# Patient Record
Sex: FEMALE | Race: WHITE | Employment: OTHER | URBAN - METROPOLITAN AREA
[De-identification: names, ages, dates, MRNs, and addresses within clinical notes are randomized per-mention and may not be internally consistent; named-entity substitution may affect disease eponyms.]

---

## 2017-01-19 ENCOUNTER — OFFICE VISIT (OUTPATIENT)
Dept: FAMILY MEDICINE CLINIC | Age: 67
End: 2017-01-19

## 2017-01-19 VITALS
HEIGHT: 63 IN | RESPIRATION RATE: 16 BRPM | SYSTOLIC BLOOD PRESSURE: 151 MMHG | BODY MASS INDEX: 21.12 KG/M2 | OXYGEN SATURATION: 100 % | HEART RATE: 91 BPM | WEIGHT: 119.2 LBS | DIASTOLIC BLOOD PRESSURE: 83 MMHG | TEMPERATURE: 96.7 F

## 2017-01-19 DIAGNOSIS — Z13.820 SCREENING FOR OSTEOPOROSIS: ICD-10-CM

## 2017-01-19 DIAGNOSIS — C56.9 OVARIAN CANCER, UNSPECIFIED LATERALITY (HCC): ICD-10-CM

## 2017-01-19 DIAGNOSIS — Z12.31 ENCOUNTER FOR MAMMOGRAM TO ESTABLISH BASELINE MAMMOGRAM: ICD-10-CM

## 2017-01-19 DIAGNOSIS — L65.9 ALOPECIA: ICD-10-CM

## 2017-01-19 DIAGNOSIS — M15.9 PRIMARY OSTEOARTHRITIS INVOLVING MULTIPLE JOINTS: Primary | ICD-10-CM

## 2017-01-19 RX ORDER — CYCLOBENZAPRINE HCL 10 MG
10 TABLET ORAL
Qty: 30 TAB | Refills: 1 | Status: SHIPPED | OUTPATIENT
Start: 2017-01-19 | End: 2017-11-14

## 2017-01-19 RX ORDER — DICLOFENAC SODIUM 10 MG/G
2 GEL TOPICAL 4 TIMES DAILY
Qty: 100 G | Refills: 12 | Status: SHIPPED | OUTPATIENT
Start: 2017-01-19 | End: 2017-11-14 | Stop reason: SDUPTHER

## 2017-01-19 NOTE — PROGRESS NOTES
1. Have you been to the ER, urgent care clinic since your last visit? Hospitalized since your last visit? No    2. Have you seen or consulted any other health care providers outside of the [address] [place] [address] since your last visit? Include any pap smears or colon screening.  No

## 2017-01-19 NOTE — MR AVS SNAPSHOT
Visit Information Date & Time Provider Department Dept. Phone Encounter #  
 1/19/2017  3:00 PM Aishwarya Artis., Zaheer 6 [phone] [id number] Follow-up Instructions Return in about 3 months (around 4/19/2017) for physical, labs at next visit, mammo prior, dexa prior, EKG next visit. Upcoming Health Maintenance Date Due DTaP/Tdap/Td series (1 - Tdap) 12/2/2008 MEDICARE YEARLY EXAM 11/7/2015 Pneumococcal 65+ High/Highest Risk (2 of 2 - PPSV23) 1/27/2016 INFLUENZA AGE 9 TO ADULT 8/1/2016 BREAST CANCER SCRN MAMMOGRAM 10/24/2016 GLAUCOMA SCREENING Q2Y 12/16/2017 COLONOSCOPY 1/13/2019 Allergies as of 1/19/2017  Review Complete On: 1/19/2017 By: Aishwarya Artis., DO No Known Allergies Current Immunizations  Reviewed on 10/14/2013 Name Date Influenza High Dose Vaccine PF 11/24/2015 Influenza Vaccine 10/27/2014 11:17 AM, 10/14/2013 Influenza Vaccine Split 10/24/2012, 12/10/2010 Influenza Vaccine Whole 12/1/2008 Pneumococcal Conjugate (PCV-13) 12/2/2015 Pneumococcal Vaccine (Unspecified Type) 12/1/2008 TD Vaccine 12/1/2008 Zoster 12/10/2010 Not reviewed this visit You Were Diagnosed With   
  
 Codes Comments Primary osteoarthritis involving multiple joints    -  Primary ICD-10-CM: M15.0 ICD-9-CM: 715.09 Alopecia     ICD-10-CM: L65.9 ICD-9-CM: 704.00 Ovarian cancer, unspecified laterality ([facility] [address]     ICD-10-CM: C56.9 ICD-9-CM: 183.0 Screening for osteoporosis     ICD-10-CM: Z13.820 ICD-9-CM: V82.81 Encounter for mammogram to establish baseline mammogram     ICD-10-CM: Z12.31 
ICD-9-CM: V76.12 Vitals BP Pulse Temp Resp Height(growth percentile) Weight(growth percentile) 151/83 (BP 1 Location: Right arm, BP Patient Position: Sitting) 91 96.7 °F (35.9 °C) (Oral) 16 5' 3\" (1.6 m) 119 lb 3.2 oz (54.1 kg) SpO2 BMI OB Status Smoking Status 100% 21.12 kg/m2 Hysterectomy Never Smoker BMI and BSA Data Body Mass Index Body Surface Area  
 21.12 kg/m 2 1.55 m 2 Preferred Pharmacy Pharmacy Name Phone [facility], [address] [phone] Your Updated Medication List  
  
   
This list is accurate as of: 1/19/17  3:19 PM.  Always use your most recent med list.  
  
  
  
  
 betamethasone dipropionate 0.05 % topical cream  
Commonly known as:  Reese Chalet Apply  to affected area two (2) times a day. On hands, arms, legs, belly. diclofenac 1 % Gel Commonly known as:  VOLTAREN Apply 2 g to affected area four (4) times daily. For hand pain  
  
 fexofenadine-pseudoephedrine  mg Tb12 Commonly known as:  ALLEGRA-D 12 HOUR Take 1 tablet by mouth two (2) times a day. polyethylene glycol 17 gram packet Commonly known as:  [place] Hallmark Take 1 Packet by mouth daily. senna-docusate 8.6-50 mg per tablet Commonly known as:  SENOKOT-S Take 3 Tabs by mouth nightly as needed for Constipation. Prescriptions Sent to Pharmacy Refills  
 diclofenac (VOLTAREN) 1 % gel 12 Sig: Apply 2 g to affected area four (4) times daily. For hand pain  
 Class: Normal  
 Pharmacy: [facility] [address] [place] [address], [address] [place] [address] #: [phone] Route: Topical  
  
Follow-up Instructions Return in about 3 months (around 4/19/2017) for physical, labs at next visit, mammo prior, dexa prior, EKG next visit. To-Do List   
 Around 04/19/2017 Lab:  CBC WITH AUTOMATED DIFF   
  
 04/19/2017 Imaging:  DEXA BONE DENSITY STUDY AXIAL Around 04/19/2017 Lab:  LIPID PANEL   
  
 04/19/2017 Imaging:  CYRIL MAMMO BI SCREENING INCL CAD Around 04/19/2017 Lab:  METABOLIC PANEL, COMPREHENSIVE Around 04/19/2017   Lab:  TSH 3RD GENERATION   
  
 Around 04/19/2017 Lab:  URINALYSIS W/ RFLX MICROSCOPIC Introducing [facility] & HEALTH SERVICES! [facility] introduces [employer] patient portal. Now you can access parts of your medical record, email your doctor's office, and request medication refills online. 1. In your internet browser, go to [email or web address] 2. Click on the First Time User? Click Here link in the Sign In box. You will see the New Member Sign Up page. 3. Enter your [employer] Access Code exactly as it appears below. You will not need to use this code after youve completed the sign-up process. If you do not sign up before the expiration date, you must request a new code. · [employer] Access Code: [email or web address] Expires: 3/22/2017  4:35 PM 
 
4. Enter the last four digits of your Social Security Number (xxxx) and Date of Birth (mm/dd/yyyy) as indicated and click Submit. You will be taken to the next sign-up page. 5. Create a [employer] ID. This will be your [employer] login ID and cannot be changed, so think of one that is secure and easy to remember. 6. Create a [employer] password. You can change your password at any time. 7. Enter your Password Reset Question and Answer. This can be used at a later time if you forget your password. 8. Enter your e-mail address. You will receive e-mail notification when new information is available in [address] 9. Click Sign Up. You can now view and download portions of your medical record. 10. Click the Download Summary menu link to download a portable copy of your medical information. If you have questions, please visit the Frequently Asked Questions section of the [employer] website. Remember, [employer] is NOT to be used for urgent needs. For medical emergencies, dial 911. Now available from your iPhone and Android! Please provide this summary of care documentation to your next provider. Your primary care clinician is listed as [id number] [facility].  If you have any questions after today's visit, please call [phone].

## 2017-01-19 NOTE — PROGRESS NOTES
Demetrius Lara is a 74 y.o.  female and presents with    Chief Complaint   Patient presents with    Hand Pain           Subjective:    Osteoarthritis and Chronic Pain:  Patient has osteoarthritis, primarily affecting the hands. Symptoms onset: problem is longstanding. Rheumatological ROS: no current joint or muscle symptoms, essentially pain-free. Response to treatment plan: stable. Additional Concerns:          Patient Active Problem List    Diagnosis Date Noted    Alopecia 01/13/2011    Ovarian cancer ([facility] 01/13/2010     Current Outpatient Prescriptions   Medication Sig Dispense Refill    diclofenac (VOLTAREN) 1 % gel Apply 2 g to affected area four (4) times daily. For hand pain 100 g 12    senna-docusate (SENOKOT-S) 8.6-50 mg per tablet Take 3 Tabs by mouth nightly as needed for Constipation. 100 Tab 12    polyethylene glycol (MIRALAX) 17 gram packet Take 1 Packet by mouth daily. 100 Each 12    betamethasone dipropionate (DIPROSONE) 0.05 % topical cream Apply  to affected area two (2) times a day. On hands, arms, legs, belly. 30 g 3    fexofenadine-pseudoephedrine (ALLEGRA-D 12 HOUR)  mg per tablet Take 1 tablet by mouth two (2) times a day. 180 each 4     No Known Allergies  Past Medical History   Diagnosis Date    Alopecia 1/13/2011    Halitosis 1/13/2011    Ovarian cancer ([facility]      02/1998     Past Surgical History   Procedure Laterality Date    Hx gyn       Family History   Problem Relation Age of Onset    Heart Disease Mother      Social History   Substance Use Topics    Smoking status: Never Smoker    Smokeless tobacco: Never Used    Alcohol use No       ROS       All other systems reviewed and are negative.       Objective:  Vitals:    01/19/17 1507   BP: 151/83   Pulse: 91   Resp: 16   Temp: 96.7 °F (35.9 °C)   TempSrc: Oral   SpO2: 100%   Weight: 119 lb 3.2 oz (54.1 kg)   Height: 5' 3\" (1.6 m)   PainSc:   0 - No pain                 alert, well appearing, and in no distress and oriented to person, place, and time  Musculoskeletal - abnormal exam of both hands  Extremities - peripheral pulses normal, no pedal edema, no clubbing or cyanosis        LABS   Component      Latest Ref Rng & Units 12/22/2016 12/22/2016 12/22/2016 12/22/2016           4:59 PM  4:59 PM  4:59 PM  4:59 PM   WBC      4.6 - 13.2 K/uL       RBC      4.20 - 5.30 M/uL       HGB      12.0 - 16.0 g/dL       HCT      35.0 - 45.0 %       MCV      74.0 - 97.0 FL       MCH      24.0 - 34.0 PG       MCHC      31.0 - 37.0 g/dL       RDW      11.6 - 14.5 %       PLATELET      972 - 148 K/uL       MPV      9.2 - 11.8 FL       NEUTROPHILS      40 - 73 %       LYMPHOCYTES      21 - 52 %       MONOCYTES      3 - 10 %       EOSINOPHILS      0 - 5 %       BASOPHILS      0 - 2 %       ABS. NEUTROPHILS      1.8 - 8.0 K/UL       ABS. LYMPHOCYTES      0.9 - 3.6 K/UL       ABS. MONOCYTES      0.05 - 1.2 K/UL       ABS. EOSINOPHILS      0.0 - 0.4 K/UL       ABS. BASOPHILS      0.0 - 0.06 K/UL       DF             CCP Antibodies IgG/IgA      0 - 19 units  5     Sed rate, automated      0 - 30 mm/hr   29    Uric acid      2.6 - 7.2 MG/DL       C-Reactive protein      0 - 0.3 mg/dL    1.4 (H)   RA Latex, Ql.      NEG   NEGATIVE        Component      Latest Ref Rng & Units 12/22/2016 12/22/2016           4:59 PM  4:59 PM   WBC      4.6 - 13.2 K/uL  9.2   RBC      4.20 - 5.30 M/uL  4.15 (L)   HGB      12.0 - 16.0 g/dL  12.7   HCT      35.0 - 45.0 %  38.5   MCV      74.0 - 97.0 FL  92.8   MCH      24.0 - 34.0 PG  30.6   MCHC      31.0 - 37.0 g/dL  33.0   RDW      11.6 - 14.5 %  12.5   PLATELET      472 - 045 K/uL  290   MPV      9.2 - 11.8 FL  9.7   NEUTROPHILS      40 - 73 %  68   LYMPHOCYTES      21 - 52 %  18 (L)   MONOCYTES      3 - 10 %  10   EOSINOPHILS      0 - 5 %  3   BASOPHILS      0 - 2 %  1   ABS. NEUTROPHILS      1.8 - 8.0 K/UL  6.3   ABS. LYMPHOCYTES      0.9 - 3.6 K/UL  1.7   ABS.  MONOCYTES      0.05 - 1.2 K/UL  0.9 ABS. EOSINOPHILS      0.0 - 0.4 K/UL  0.3   ABS. BASOPHILS      0.0 - 0.06 K/UL  0.1 (H)   DF        AUTOMATED   CCP Antibodies IgG/IgA      0 - 19 units     Sed rate, automated      0 - 30 mm/hr     Uric acid      2.6 - 7.2 MG/DL 2.5 (L)    C-Reactive protein      0 - 0.3 mg/dL     RA Latex, Ql.      NEG         TESTS  EXAM: XR HAND LT AP/LAT     INDICATION: Left hand pain     COMPARISON: None.     FINDINGS: 2 views of the left hand demonstrate no evidence of fracture or  dislocation or other acute finding. Medial subluxation of the second metacarpal  phalangeal joint noted. Moderate/severe degenerative changes of the first  interphalangeal joint are present with loss of joint space and marginal  osteophytes. Mild overlying soft tissue swelling.     IMPRESSION  IMPRESSION: Chronic/degenerative changes as described. No acute osseous  abnormality identified.     EXAM: XR HAND RT AP/LAT     INDICATION: Right hand injury, pain and swelling.     COMPARISON: None.     FINDINGS: 2 views of the right hand demonstrate no evidence of fracture or  dislocation. Medial subluxation of the second and third metacarpophalangeal  joints noted. Mild/moderate degenerative changes are present, most notably of  the second distal interphalangeal joint. No focal soft tissue abnormality  identified.     IMPRESSION  IMPRESSION: No acute osseous abnormality identified. Chronic/degenerative  changes as described. Assessment/Plan:    osteoarthitis   Will give some voltaren gel and f/u prn      Lab review:       I have discussed the diagnosis with the patient and the intended plan as seen in the above orders. The patient has received an after-visit summary and questions were answered concerning future plans. I have discussed medication side effects and warnings with the patient as well. I have reviewed the plan of care with the patient, accepted their input and they are in agreement with the treatment goals.      Follow-up Disposition: Not on File

## 2017-01-24 ENCOUNTER — TELEPHONE (OUTPATIENT)
Dept: FAMILY MEDICINE CLINIC | Age: 67
End: 2017-01-24

## 2017-02-08 ENCOUNTER — HOSPITAL ENCOUNTER (OUTPATIENT)
Dept: MAMMOGRAPHY | Age: 67
Discharge: HOME | End: 2017-02-08
Attending: FAMILY MEDICINE
Payer: COMMERCIAL

## 2017-02-08 ENCOUNTER — HOSPITAL ENCOUNTER (OUTPATIENT)
Dept: GENERAL RADIOLOGY | Age: 67
Discharge: HOME | End: 2017-02-08
Attending: FAMILY MEDICINE
Payer: COMMERCIAL

## 2017-02-08 DIAGNOSIS — C56.9 OVARIAN CANCER, UNSPECIFIED LATERALITY (HCC): ICD-10-CM

## 2017-02-08 DIAGNOSIS — M15.9 PRIMARY OSTEOARTHRITIS INVOLVING MULTIPLE JOINTS: ICD-10-CM

## 2017-02-08 DIAGNOSIS — L65.9 ALOPECIA: ICD-10-CM

## 2017-02-08 DIAGNOSIS — M81.0 OSTEOPOROSIS: ICD-10-CM

## 2017-02-08 DIAGNOSIS — Z13.820 SCREENING FOR OSTEOPOROSIS: ICD-10-CM

## 2017-02-08 DIAGNOSIS — Z12.31 ENCOUNTER FOR MAMMOGRAM TO ESTABLISH BASELINE MAMMOGRAM: ICD-10-CM

## 2017-02-08 PROCEDURE — 77080 DXA BONE DENSITY AXIAL: CPT

## 2017-02-08 PROCEDURE — 77063 BREAST TOMOSYNTHESIS BI: CPT

## 2017-02-08 PROCEDURE — 77067 SCR MAMMO BI INCL CAD: CPT

## 2017-04-14 ENCOUNTER — TELEPHONE (OUTPATIENT)
Dept: FAMILY MEDICINE CLINIC | Age: 67
End: 2017-04-14

## 2017-04-14 NOTE — TELEPHONE ENCOUNTER
Pt  called stating that Mrs. Peter Patiño has an UTI and would like for Dr. John Young to fill her a prescription for her to treat it.  Please assist.

## 2017-04-14 NOTE — TELEPHONE ENCOUNTER
Please see message below. Patient wanted same day appt but was advised that there were no appts avail. Please advise.

## 2017-10-05 ENCOUNTER — OFFICE VISIT (OUTPATIENT)
Dept: FAMILY MEDICINE CLINIC | Age: 67
End: 2017-10-05

## 2017-10-05 VITALS
DIASTOLIC BLOOD PRESSURE: 67 MMHG | BODY MASS INDEX: 21.09 KG/M2 | HEIGHT: 63 IN | RESPIRATION RATE: 16 BRPM | SYSTOLIC BLOOD PRESSURE: 141 MMHG | HEART RATE: 86 BPM | WEIGHT: 119 LBS | TEMPERATURE: 96.5 F | OXYGEN SATURATION: 97 %

## 2017-10-05 DIAGNOSIS — D22.9 NEVUS: Primary | ICD-10-CM

## 2017-10-05 NOTE — MR AVS SNAPSHOT
Visit Information Date & Time Provider Department Dept. Phone Encounter #  
 10/5/2017  1:00 PM Ignacia Ariza [phone] [id number] Follow-up Instructions Return if symptoms worsen or fail to improve. Upcoming Health Maintenance Date Due DTaP/Tdap/Td series (1 - Tdap) 12/2/2008 MEDICARE YEARLY EXAM 11/7/2015 Pneumococcal 65+ High/Highest Risk (2 of 2 - PPSV23) 1/27/2016 INFLUENZA AGE 9 TO ADULT 8/1/2017 GLAUCOMA SCREENING Q2Y 12/16/2017 COLONOSCOPY 1/13/2019 BREAST CANCER SCRN MAMMOGRAM 2/8/2019 Allergies as of 10/5/2017  Review Complete On: 2/8/2017 By: Zulay Jean No Known Allergies Current Immunizations  Reviewed on 10/14/2013 Name Date Influenza High Dose Vaccine PF 11/24/2015 Influenza Vaccine 10/27/2014 11:17 AM, 10/14/2013 Influenza Vaccine Split 10/24/2012, 12/10/2010 Influenza Vaccine Whole 12/1/2008 Pneumococcal Conjugate (PCV-13) 12/2/2015 TD Vaccine 12/1/2008 ZZZ-RETIRED (DO NOT USE) Pneumococcal Vaccine (Unspecified Type) 12/1/2008 Zoster 12/10/2010 Not reviewed this visit You Were Diagnosed With   
  
 Codes Comments Nevus    -  Primary ICD-10-CM: D22.9 ICD-9-CM: 216.9 Vitals OB Status Smoking Status Hysterectomy Never Smoker Preferred Pharmacy Pharmacy Name Phone [facility], [address] [phone] Your Updated Medication List  
  
   
This list is accurate as of: 10/5/17  1:26 PM.  Always use your most recent med list.  
  
  
  
  
 betamethasone dipropionate 0.05 % topical cream  
Commonly known as:  Idalia Jaiden Apply  to affected area two (2) times a day. On hands, arms, legs, belly. cyclobenzaprine 10 mg tablet Commonly known as:  FLEXERIL Take 1 Tab by mouth three (3) times daily as needed for Muscle Spasm(s). diclofenac 1 % Gel Commonly known as:  VOLTAREN Apply 2 g to affected area four (4) times daily. For hand pain  
  
 fexofenadine-pseudoephedrine  mg Tb12 Commonly known as:  ALLEGRA-D 12 HOUR Take 1 tablet by mouth two (2) times a day. polyethylene glycol 17 gram packet Commonly known as:  Devyn Kevyn Take 1 Packet by mouth daily. senna-docusate 8.6-50 mg per tablet Commonly known as:  SENOKOT-S Take 3 Tabs by mouth nightly as needed for Constipation. Follow-up Instructions Return if symptoms worsen or fail to improve. Introducing [facility] & HEALTH SERVICES! [facility] introduces [employer] patient portal. Now you can access parts of your medical record, email your doctor's office, and request medication refills online. 1. In your internet browser, go to [email or web address] 2. Click on the First Time User? Click Here link in the Sign In box. You will see the New Member Sign Up page. 3. Enter your [employer] Access Code exactly as it appears below. You will not need to use this code after youve completed the sign-up process. If you do not sign up before the expiration date, you must request a new code. · [employer] Access Code: [email or web address] Expires: 1/3/2018  1:13 PM 
 
4. Enter the last four digits of your Social Security Number (xxxx) and Date of Birth (mm/dd/yyyy) as indicated and click Submit. You will be taken to the next sign-up page. 5. Create a [employer] ID. This will be your [employer] login ID and cannot be changed, so think of one that is secure and easy to remember. 6. Create a [employer] password. You can change your password at any time. 7. Enter your Password Reset Question and Answer. This can be used at a later time if you forget your password. 8. Enter your e-mail address. You will receive e-mail notification when new information is available in [address] 9. Click Sign Up.  You can now view and download portions of your medical record. 10. Click the Download Summary menu link to download a portable copy of your medical information. If you have questions, please visit the Frequently Asked Questions section of the [employer] website. Remember, [employer] is NOT to be used for urgent needs. For medical emergencies, dial 911. Now available from your iPhone and Android! Please provide this summary of care documentation to your next provider. Your primary care clinician is listed as [phone] [facility]. If you have any questions after today's visit, please call [phone].

## 2017-10-05 NOTE — PROGRESS NOTES
Katy Parker is a 69 y.o. female presents today for complaint of moles on her back and face. Pt is in Room # tx        1. Have you been to the ER, urgent care clinic since your last visit? Hospitalized since your last visit? No    2. Have you seen or consulted any other health care providers outside of the [address] [place] [address] since your last visit? Include any pap smears or colon screening.  No

## 2017-10-05 NOTE — PROGRESS NOTES
Subjective:    Kole Urbano is a 69 y.o. female who presents for lesion removal. We have discussed this procedure, including option of not performing surgery, technique of surgery and potential for scarring at an earlier visit. Oubjective:   Patient appears well. Visit Vitals    /67 (BP 1 Location: Left arm, BP Patient Position: Sitting)    Pulse 86    Temp 96.5 °F (35.8 °C) (Oral)    Resp 16    Ht 5' 3\" (1.6 m)    Wt 119 lb (54 kg)    SpO2 97%    BMI 21.08 kg/m2     Skin: tiny mole on left lip and 3 very tiny moles on her back      Assessment:   benign intradermal nevus    Procedure Note:   After informed consent was obtained, using alcohol for cleansing with sterile technique, cryotherapy with liquid nitrogen was performed. Antibiotic dressing is applied, and wound care instructions provided. Be alert for any signs of cutaneous infection. The procedure was well tolerated without complications. Follow up: the patient may return prn.

## 2017-11-14 ENCOUNTER — OFFICE VISIT (OUTPATIENT)
Dept: FAMILY MEDICINE CLINIC | Age: 67
End: 2017-11-14

## 2017-11-14 VITALS
OXYGEN SATURATION: 97 % | RESPIRATION RATE: 16 BRPM | TEMPERATURE: 97.5 F | BODY MASS INDEX: 20.66 KG/M2 | WEIGHT: 116.6 LBS | SYSTOLIC BLOOD PRESSURE: 134 MMHG | DIASTOLIC BLOOD PRESSURE: 60 MMHG | HEIGHT: 63 IN | HEART RATE: 87 BPM

## 2017-11-14 DIAGNOSIS — Z12.31 ENCOUNTER FOR MAMMOGRAM TO ESTABLISH BASELINE MAMMOGRAM: ICD-10-CM

## 2017-11-14 DIAGNOSIS — M15.9 PRIMARY OSTEOARTHRITIS INVOLVING MULTIPLE JOINTS: ICD-10-CM

## 2017-11-14 DIAGNOSIS — J06.9 VIRAL UPPER RESPIRATORY TRACT INFECTION: Primary | ICD-10-CM

## 2017-11-14 DIAGNOSIS — Z13.820 SCREENING FOR OSTEOPOROSIS: ICD-10-CM

## 2017-11-14 DIAGNOSIS — L82.1 SEBORRHEIC KERATOSES: ICD-10-CM

## 2017-11-14 DIAGNOSIS — L65.9 ALOPECIA: ICD-10-CM

## 2017-11-14 DIAGNOSIS — C56.9 OVARIAN CANCER, UNSPECIFIED LATERALITY (HCC): ICD-10-CM

## 2017-11-14 LAB
QUICKVUE INFLUENZA TEST: NEGATIVE
S PYO AG THROAT QL: NEGATIVE
VALID INTERNAL CONTROL?: YES
VALID INTERNAL CONTROL?: YES

## 2017-11-14 RX ORDER — DICLOFENAC SODIUM 10 MG/G
2 GEL TOPICAL 4 TIMES DAILY
Qty: 100 G | Refills: 12 | Status: SHIPPED | OUTPATIENT
Start: 2017-11-14 | End: 2018-03-13 | Stop reason: SDUPTHER

## 2017-11-14 RX ORDER — PROMETHAZINE HYDROCHLORIDE, PHENYLEPHRINE HYDROCHLORIDE AND CODEINE PHOSPHATE 6.25; 5; 1 MG/5ML; MG/5ML; MG/5ML
5 SOLUTION ORAL
Qty: 120 ML | Refills: 1 | Status: SHIPPED | OUTPATIENT
Start: 2017-11-14 | End: 2018-03-13

## 2017-11-14 NOTE — PROGRESS NOTES
Estephania Rizo is a 69 y.o.  female and presents with    Chief Complaint   Patient presents with    Cold    Seborrheic Keratosis           Subjective:  Upper Respiratory Infection  Patient complains of symptoms of a URI. Symptoms include congestion and coryza. Onset of symptoms was 2 days ago, stable since that time. She also c/o achiness for the past 2 days . She is drinking plenty of fluids. . Evaluation to date: none. Treatment to date: none. Also has a couple of tiny seb kers one on her right cheek and 2 on her back. Additional Concerns:          Patient Active Problem List    Diagnosis Date Noted    Alopecia 01/13/2011    Ovarian cancer ([facility] [address] 01/13/2010     Current Outpatient Prescriptions   Medication Sig Dispense Refill    promethazine-phenyleph-codeine (PHENERGAN VC WITH CODEINE) 6.25-5-10 mg/5 mL syrp Take 5 mL by mouth every six (6) hours as needed. Max Daily Amount: 20 mL. 120 mL 1    diclofenac (VOLTAREN) 1 % gel Apply 2 g to affected area four (4) times daily. For hand pain 100 g 12    cyclobenzaprine (FLEXERIL) 10 mg tablet Take 1 Tab by mouth three (3) times daily as needed for Muscle Spasm(s). 30 Tab 1    senna-docusate (SENOKOT-S) 8.6-50 mg per tablet Take 3 Tabs by mouth nightly as needed for Constipation. 100 Tab 12    polyethylene glycol (MIRALAX) 17 gram packet Take 1 Packet by mouth daily. 100 Each 12    betamethasone dipropionate (DIPROSONE) 0.05 % topical cream Apply  to affected area two (2) times a day. On hands, arms, legs, belly. 30 g 3    fexofenadine-pseudoephedrine (ALLEGRA-D 12 HOUR)  mg per tablet Take 1 tablet by mouth two (2) times a day.  180 each 4     No Known Allergies  Past Medical History:   Diagnosis Date    Alopecia 1/13/2011    Halitosis 1/13/2011    Menopause     Ovarian cancer ([facility]     02/1998     Past Surgical History:   Procedure Laterality Date    HX GYN      HX HYSTERECTOMY       Family History   Problem Relation Age of Onset  Heart Disease Mother      Social History   Substance Use Topics    Smoking status: Never Smoker    Smokeless tobacco: Never Used    Alcohol use No       ROS       All other systems reviewed and are negative. Objective:  Vitals:    11/14/17 1114   BP: 134/60   Pulse: 87   Resp: 16   Temp: 97.5 °F (36.4 °C)   TempSrc: Oral   SpO2: 97%   Weight: 116 lb 9.6 oz (52.9 kg)   Height: 5' 3\" (1.6 m)                 alert, well appearing, and in no distress, oriented to person, place, and time and normal appearing weight  Ears - bilateral TM's and external ear canals normal  Nose - normal and patent, no erythema, discharge or polyps  Mouth - mucous membranes moist, pharynx normal without lesions  Neck - supple, no significant adenopathy  Lymphatics - no palpable lymphadenopathy, no hepatosplenomegaly  Chest - clear to auscultation, no wheezes, rales or rhonchi, symmetric air entry  Heart - normal rate, regular rhythm, normal S1, S2, no murmurs, rubs, clicks or gallops        LABS   Strep and flu neg    TESTS      Assessment/Plan:    Truman Dangelo try phen cod f/u prn      carissa kers -- very tiny -- I just touched with a very light spray of liq nitrogen. She can f/uy if they don;'t resolved      Lab review:     Diagnoses and all orders for this visit:    1. Viral upper respiratory tract infection  -     AMB POC RAPID INFLUENZA TEST  -     AMB POC RAPID STREP A    2. Seborrheic keratoses    Other orders  -     promethazine-phenyleph-codeine (PHENERGAN VC WITH CODEINE) 6.25-5-10 mg/5 mL syrp; Take 5 mL by mouth every six (6) hours as needed. Max Daily Amount: 20 mL. I have discussed the diagnosis with the patient and the intended plan as seen in the above orders. The patient has received an after-visit summary and questions were answered concerning future plans. I have discussed medication side effects and warnings with the patient as well.  I have reviewed the plan of care with the patient, accepted their input and they are in agreement with the treatment goals. Follow-up Disposition:  Return if symptoms worsen or fail to improve.

## 2017-11-14 NOTE — MR AVS SNAPSHOT
Visit Information Date & Time Provider Department Dept. Phone Encounter #  
 11/14/2017 10:30 AM Ignacia Ariza [phone] [id number] Follow-up Instructions Return if symptoms worsen or fail to improve. Follow-up and Disposition History Upcoming Health Maintenance Date Due DTaP/Tdap/Td series (1 - Tdap) 12/2/2008 MEDICARE YEARLY EXAM 11/7/2015 Pneumococcal 65+ High/Highest Risk (2 of 2 - PPSV23) 1/27/2016 Influenza Age 5 to Adult 8/1/2017 GLAUCOMA SCREENING Q2Y 12/16/2017 COLONOSCOPY 1/13/2019 BREAST CANCER SCRN MAMMOGRAM 2/8/2019 Allergies as of 11/14/2017  Review Complete On: 11/14/2017 By: Higinio Haji., DO No Known Allergies Current Immunizations  Reviewed on 10/14/2013 Name Date Influenza High Dose Vaccine PF 11/24/2015 Influenza Vaccine 10/27/2014 11:17 AM, 10/14/2013 Influenza Vaccine Split 10/24/2012, 12/10/2010 Influenza Vaccine Whole 12/1/2008 Pneumococcal Conjugate (PCV-13) 12/2/2015 TD Vaccine 12/1/2008 ZZZ-RETIRED (DO NOT USE) Pneumococcal Vaccine (Unspecified Type) 12/1/2008 Zoster 12/10/2010 Not reviewed this visit You Were Diagnosed With   
  
 Codes Comments Viral upper respiratory tract infection    -  Primary ICD-10-CM: J06.9, B97.89 ICD-9-CM: 465.9 Seborrheic keratoses     ICD-10-CM: L82.1 ICD-9-CM: 702.19 Vitals BP Pulse Temp Resp Height(growth percentile) Weight(growth percentile) 134/60 (BP 1 Location: Left arm, BP Patient Position: Sitting) 87 97.5 °F (36.4 °C) (Oral) 16 5' 3\" (1.6 m) 116 lb 9.6 oz (52.9 kg) SpO2 BMI OB Status Smoking Status 97% 20.65 kg/m2 Hysterectomy Never Smoker BMI and BSA Data Body Mass Index Body Surface Area  
 20.65 kg/m 2 1.53 m 2 Preferred Pharmacy Pharmacy Name Phone  [facility], [address] [place] [address] SOPHIE/Leon Andrade [phone] Your Updated Medication List  
  
   
This list is accurate as of: 11/14/17 11:31 AM.  Always use your most recent med list.  
  
  
  
  
 betamethasone dipropionate 0.05 % topical cream  
Commonly known as:  Reese Chalet Apply  to affected area two (2) times a day. On hands, arms, legs, belly. cyclobenzaprine 10 mg tablet Commonly known as:  FLEXERIL Take 1 Tab by mouth three (3) times daily as needed for Muscle Spasm(s). diclofenac 1 % Gel Commonly known as:  VOLTAREN Apply 2 g to affected area four (4) times daily. For hand pain  
  
 fexofenadine-pseudoephedrine  mg Tb12 Commonly known as:  ALLEGRA-D 12 HOUR Take 1 tablet by mouth two (2) times a day. polyethylene glycol 17 gram packet Commonly known as:  Puposky Hallmark Take 1 Packet by mouth daily. promethazine-phenyleph-codeine 6.25-5-10 mg/5 mL Syrp Commonly known as:  PHENERGAN VC WITH CODEINE Take 5 mL by mouth every six (6) hours as needed. Max Daily Amount: 20 mL. senna-docusate 8.6-50 mg per tablet Commonly known as:  SENOKOT-S Take 3 Tabs by mouth nightly as needed for Constipation. Prescriptions Printed Refills  
 promethazine-phenyleph-codeine (PHENERGAN VC WITH CODEINE) 6.25-5-10 mg/5 mL syrp 1 Sig: Take 5 mL by mouth every six (6) hours as needed. Max Daily Amount: 20 mL. Class: Print Route: Oral  
  
We Performed the Following AMB POC RAPID INFLUENZA TEST [13930 CPT(R)] AMB POC RAPID STREP A [53301 CPT(R)] Follow-up Instructions Return if symptoms worsen or fail to improve. Introducing [facility] & HEALTH SERVICES! Aris Munoz introduces Pureshield patient portal. Now you can access parts of your medical record, email your doctor's office, and request medication refills online. 1. In your internet browser, go to [email or web address] 2. Click on the First Time User? Click Here link in the Sign In box. You will see the New Member Sign Up page. 3. Enter your Shout For Good Access Code exactly as it appears below. You will not need to use this code after youve completed the sign-up process. If you do not sign up before the expiration date, you must request a new code. · Shout For Good Access Code: [email or web address] Expires: 1/3/2018 12:13 PM 
 
4. Enter the last four digits of your Social Security Number (xxxx) and Date of Birth (mm/dd/yyyy) as indicated and click Submit. You will be taken to the next sign-up page. 5. Create a Shout For Good ID. This will be your Shout For Good login ID and cannot be changed, so think of one that is secure and easy to remember. 6. Create a Shout For Good password. You can change your password at any time. 7. Enter your Password Reset Question and Answer. This can be used at a later time if you forget your password. 8. Enter your e-mail address. You will receive e-mail notification when new information is available in [address] 9. Click Sign Up. You can now view and download portions of your medical record. 10. Click the Download Summary menu link to download a portable copy of your medical information. If you have questions, please visit the Frequently Asked Questions section of the Shout For Good website. Remember, Shout For Good is NOT to be used for urgent needs. For medical emergencies, dial 911. Now available from your iPhone and Android! Please provide this summary of care documentation to your next provider. Your primary care clinician is listed as [address]. If you have any questions after today's visit, please call [phone].

## 2017-11-14 NOTE — PROGRESS NOTES
Mendel Blacker is a 69 y.o. female presents today for skin tag removal to her back. Patient also reports of a tickle in her throat for 2 weeks           Pt is in Room # tx        1. Have you been to the ER, urgent care clinic since your last visit? Hospitalized since your last visit? No    2. Have you seen or consulted any other health care providers outside of the [address] [place] [address] since your last visit? Include any pap smears or colon screening.  No

## 2018-02-08 ENCOUNTER — HOSPITAL ENCOUNTER (OUTPATIENT)
Dept: MAMMOGRAPHY | Age: 68
Discharge: HOME | End: 2018-02-08
Attending: FAMILY MEDICINE
Payer: MEDICARE

## 2018-02-08 DIAGNOSIS — Z12.31 VISIT FOR SCREENING MAMMOGRAM: ICD-10-CM

## 2018-02-08 PROCEDURE — 77067 SCR MAMMO BI INCL CAD: CPT

## 2018-03-13 ENCOUNTER — OFFICE VISIT (OUTPATIENT)
Dept: FAMILY MEDICINE CLINIC | Age: 68
End: 2018-03-13

## 2018-03-13 VITALS
OXYGEN SATURATION: 94 % | HEART RATE: 80 BPM | WEIGHT: 120 LBS | TEMPERATURE: 97.1 F | HEIGHT: 63 IN | DIASTOLIC BLOOD PRESSURE: 69 MMHG | BODY MASS INDEX: 21.26 KG/M2 | RESPIRATION RATE: 18 BRPM | SYSTOLIC BLOOD PRESSURE: 133 MMHG

## 2018-03-13 DIAGNOSIS — M15.9 PRIMARY OSTEOARTHRITIS INVOLVING MULTIPLE JOINTS: ICD-10-CM

## 2018-03-13 DIAGNOSIS — R05.9 COUGH: Primary | ICD-10-CM

## 2018-03-13 DIAGNOSIS — Z13.820 SCREENING FOR OSTEOPOROSIS: ICD-10-CM

## 2018-03-13 DIAGNOSIS — L65.9 ALOPECIA: ICD-10-CM

## 2018-03-13 DIAGNOSIS — J02.9 SORE THROAT: ICD-10-CM

## 2018-03-13 DIAGNOSIS — C56.9 OVARIAN CANCER, UNSPECIFIED LATERALITY (HCC): ICD-10-CM

## 2018-03-13 DIAGNOSIS — Z12.31 ENCOUNTER FOR MAMMOGRAM TO ESTABLISH BASELINE MAMMOGRAM: ICD-10-CM

## 2018-03-13 RX ORDER — DICLOFENAC SODIUM 10 MG/G
2 GEL TOPICAL 4 TIMES DAILY
Qty: 100 G | Refills: 12 | Status: SHIPPED | OUTPATIENT
Start: 2018-03-13 | End: 2018-04-02 | Stop reason: SDUPTHER

## 2018-03-13 RX ORDER — HYDROCODONE POLISTIREX AND CHLORPHENIRAMINE POLISTIREX 10; 8 MG/5ML; MG/5ML
5 SUSPENSION, EXTENDED RELEASE ORAL
Qty: 180 ML | Refills: 0 | Status: SHIPPED | OUTPATIENT
Start: 2018-03-13 | End: 2018-04-02

## 2018-03-13 NOTE — PROGRESS NOTES
Rm 5  Pt presents to the clinic complaining of a cold. Flu Shot Requested: no    Depression Screening:  PHQ over the last two weeks 3/13/2018 1/19/2017 11/29/2016 9/12/2016 8/13/2015   Little interest or pleasure in doing things Not at all Not at all Not at all Not at all Not at all   Feeling down, depressed or hopeless Not at all Not at all Not at all Not at all Not at all   Total Score PHQ 2 0 0 0 0 0       Learning Assessment:  Learning Assessment 10/1/2013   PRIMARY LEARNER Patient   PRIMARY LANGUAGE ENGLISH   LEARNER PREFERENCE PRIMARY READING     LISTENING     DEMONSTRATION   ANSWERED BY patient   RELATIONSHIP SELF       Abuse Screening:  No flowsheet data found. Health Maintenance reviewed and discussed per provider: yes     Coordination of Care:    1. Have you been to the ER, urgent care clinic since your last visit? Hospitalized since your last visit? no    2. Have you seen or consulted any other health care providers outside of the [address] [place] [address] since your last visit? Include any pap smears or colon screening.  no

## 2018-03-13 NOTE — PROGRESS NOTES
Lorin Browning is a 69 y.o.  female and presents with    Chief Complaint   Patient presents with    URI    Arthritis           Subjective:    Osteoarthritis and Chronic Pain:  Patient has osteoarthritis, primarily affecting the diffuse. Symptoms onset: problem is longstanding. Rheumatological ROS: no current joint or muscle symptoms, essentially pain-free. Response to treatment plan: stable. Additional Concerns: has chronic cough. Mild irritated          Patient Active Problem List    Diagnosis Date Noted    Alopecia 01/13/2011    Ovarian cancer ([facility] 01/13/2010     Current Outpatient Prescriptions   Medication Sig Dispense Refill    diclofenac (VOLTAREN) 1 % gel Apply 2 g to affected area four (4) times daily. For hand pain 100 g 12    chlorpheniramine-HYDROcodone (TUSSIONEX) 10-8 mg/5 mL suspension Take 5 mL by mouth every twelve (12) hours as needed for Cough. Max Daily Amount: 10 mL. 180 mL 0    betamethasone dipropionate (DIPROSONE) 0.05 % topical cream Apply  to affected area two (2) times a day. On hands, arms, legs, belly. 30 g 3     No Known Allergies  Past Medical History:   Diagnosis Date    Alopecia 1/13/2011    Halitosis 1/13/2011    Menopause     Ovarian cancer ([facility]     02/1998     Past Surgical History:   Procedure Laterality Date    HX GYN      HX HYSTERECTOMY       Family History   Problem Relation Age of Onset    Heart Disease Mother      Social History   Substance Use Topics    Smoking status: Never Smoker    Smokeless tobacco: Never Used    Alcohol use No       ROS       All other systems reviewed and are negative.       Objective:  Vitals:    03/13/18 1359   BP: 133/69   Pulse: 80   Resp: 18   Temp: 97.1 °F (36.2 °C)   TempSrc: Oral   SpO2: 94%   Weight: 120 lb (54.4 kg)   Height: 5' 3\" (1.6 m)   PainSc:   0 - No pain                 alert, well appearing, and in no distress and oriented to person, place, and time  Chest - clear to auscultation, no wheezes, rales or rhonchi, symmetric air entry  Heart - normal rate, regular rhythm, normal S1, S2, no murmurs, rubs, clicks or gallops        LABS     TESTS      Assessment/Plan:    Mild uri cover with tussionex f/u prn  arthirs refilled voltaren      Lab review:     Diagnoses and all orders for this visit:    1. Cough  -     AMB POC RAPID INFLUENZA TEST  -     AMB POC RAPID STREP A  -     chlorpheniramine-HYDROcodone (TUSSIONEX) 10-8 mg/5 mL suspension; Take 5 mL by mouth every twelve (12) hours as needed for Cough. Max Daily Amount: 10 mL. 2. Sore throat  -     AMB POC RAPID INFLUENZA TEST  -     AMB POC RAPID STREP A    3. Primary osteoarthritis involving multiple joints  -     diclofenac (VOLTAREN) 1 % gel; Apply 2 g to affected area four (4) times daily. For hand pain    4. Alopecia  -     diclofenac (VOLTAREN) 1 % gel; Apply 2 g to affected area four (4) times daily. For hand pain    5. Ovarian cancer, unspecified laterality ([facility]  -     diclofenac (VOLTAREN) 1 % gel; Apply 2 g to affected area four (4) times daily. For hand pain    6. Screening for osteoporosis  -     diclofenac (VOLTAREN) 1 % gel; Apply 2 g to affected area four (4) times daily. For hand pain    7. Encounter for mammogram to establish baseline mammogram  -     diclofenac (VOLTAREN) 1 % gel; Apply 2 g to affected area four (4) times daily. For hand pain          I have discussed the diagnosis with the patient and the intended plan as seen in the above orders. The patient has received an after-visit summary and questions were answered concerning future plans. I have discussed medication side effects and warnings with the patient as well. I have reviewed the plan of care with the patient, accepted their input and they are in agreement with the treatment goals.      Follow-up Disposition: Not on File

## 2018-03-13 NOTE — MR AVS SNAPSHOT
[facility] 
 
 
 [address] [place] [address] [facility] [address] [address] 
[phone] Patient: Joanne Cleaning MRN: [id number] :1950 Visit Information Date & Time Provider Department Dept. Phone Encounter #  
 3/13/2018  2:30 PM Ign[facility] Ariza [phone] [id number] Follow-up Instructions Return if symptoms worsen or fail to improve. Your Appointments 3/13/2018  2:30 PM  
[place] [address] [address] MED CTR[facility]) Appt Note: Cold & Med Refills [id number] Formerly named [facility] [address] [facility] 83 [address] [facility]  
  
   
 [address] [place] [address]  
  
    
 2018  9:00 AM  
[place] [address] [address] [facility] Appt Note: cpe  
 [address] Formerly named [facility] [address] [facility] 83 [address] [facility]  
  
   
 [address] [place] [address] [facility] Upcoming Health Maintenance Date Due DTaP/Tdap/Td series (1 - Tdap) 2008 MEDICARE YEARLY EXAM 2015 Pneumococcal 65+ High/Highest Risk (2 of 2 - PPSV23) 2016 Influenza Age 5 to Adult 2017 GLAUCOMA SCREENING Q2Y 2017 COLONOSCOPY 2019 BREAST CANCER SCRN MAMMOGRAM 2020 Allergies as of 3/13/2018  Review Complete On: 3/13/2018 By: Marisela Martinez LPN No Known Allergies Current Immunizations  Reviewed on 10/14/2013 Name Date Influenza High Dose Vaccine PF 2015 Influenza Vaccine 10/27/2014 11:17 AM, 10/14/2013 Influenza Vaccine Split 10/24/2012, 12/10/2010 Influenza Vaccine Whole 2008 Pneumococcal Conjugate (PCV-13) 2015 TD Vaccine 2008 ZZZ-RETIRED (DO NOT USE) Pneumococcal Vaccine (Unspecified Type) 2008 Zoster 12/10/2010 Not reviewed this visit You Were Diagnosed With   
  
 Codes Comments Cough    -  Primary ICD-10-CM: O34 ICD-9-CM: 786.2 Sore throat     ICD-10-CM: J02.9 ICD-9-CM: 733 Primary osteoarthritis involving multiple joints     ICD-10-CM: M15.0 ICD-9-CM: 715.09 Alopecia     ICD-10-CM: L65.9 ICD-9-CM: 704.00 [facility]     ICD-10-CM: C56.9 ICD-9-CM: 183.0 Screening for osteoporosis     ICD-10-CM: Z13.820 ICD-9-CM: V82.81 Encounter for mammogram to establish baseline mammogram     ICD-10-CM: Z12.31 
ICD-9-CM: V76.12 Vitals BP Pulse Temp Resp Height(growth percentile) Weight(growth percentile) 133/69 (BP 1 Location: Right arm, BP Patient Position: Sitting) 80 97.1 °F (36.2 °C) (Oral) 18 5' 3\" (1.6 m) 120 lb (54.4 kg) SpO2 BMI OB Status Smoking Status 94% 21.26 kg/m2 Hysterectomy Never Smoker Vitals History BMI and BSA Data Body Mass Index Body Surface Area  
 21.26 kg/m 2 1.56 m 2 Preferred Pharmacy Pharmacy Name Phone [facility], [address] [facility] [address] [facility] [phone] Your Updated Medication List  
  
   
This list is accurate as of 3/13/18  2:25 PM.  Always use your most recent med list.  
  
  
  
  
 betamethasone dipropionate 0.05 % topical cream  
Commonly known as:  Murriel Remedies Apply  to affected area two (2) times a day. On hands, arms, legs, belly. chlorpheniramine-HYDROcodone 10-8 mg/5 mL suspension Commonly known as:  Neris Uvalde Take 5 mL by mouth every twelve (12) hours as needed for Cough. Max Daily Amount: 10 mL. diclofenac 1 % Gel Commonly known as:  VOLTAREN Apply 2 g to affected area four (4) times daily. For hand pain Prescriptions Printed Refills  
 chlorpheniramine-HYDROcodone (TUSSIONEX) 10-8 mg/5 mL suspension 0 Sig: Take 5 mL by mouth every twelve (12) hours as needed for Cough. Max Daily Amount: 10 mL. Class: Print Route: Oral  
  
Prescriptions Sent to Pharmacy Refills  
 diclofenac (VOLTAREN) 1 % gel 12 Sig: Apply 2 g to affected area four (4) times daily. For hand pain  
 Class: Normal  
 Pharmacy: [facility] [address] [place] [address], [address] [place] [address] #: [phone] Route: Topical  
  
We Performed the Following AMB POC RAPID INFLUENZA TEST [36574 CPT(R)] AMB POC RAPID STREP A [45513 CPT(R)] Follow-up Instructions Return if symptoms worsen or fail to improve. Introducing [facility] & [facility]! Dear Jeana Cortez: Thank you for requesting a Photowhoa account. Our records indicate that you already have an active Photowhoa account. You can access your account anytime at [email or web address] Did you know that you can access your hospital and ER discharge instructions at any time in Photowhoa? You can also review all of your test results from your hospital stay or ER visit. Additional Information If you have questions, please visit the Frequently Asked Questions section of the Photowhoa website at [email or web address] Remember, Photowhoa is NOT to be used for urgent needs. For medical emergencies, dial 911. Now available from your iPhone and Android! Please provide this summary of care documentation to your next provider. Your primary care clinician is listed as [address]. If you have any questions after today's visit, please call [phone].

## 2018-04-02 ENCOUNTER — OFFICE VISIT (OUTPATIENT)
Dept: FAMILY MEDICINE CLINIC | Age: 68
End: 2018-04-02

## 2018-04-02 VITALS
OXYGEN SATURATION: 99 % | HEART RATE: 83 BPM | SYSTOLIC BLOOD PRESSURE: 148 MMHG | BODY MASS INDEX: 20.91 KG/M2 | RESPIRATION RATE: 14 BRPM | WEIGHT: 118 LBS | TEMPERATURE: 97.3 F | HEIGHT: 63 IN | DIASTOLIC BLOOD PRESSURE: 67 MMHG

## 2018-04-02 DIAGNOSIS — Z23 ENCOUNTER FOR IMMUNIZATION: ICD-10-CM

## 2018-04-02 DIAGNOSIS — M15.9 PRIMARY OSTEOARTHRITIS INVOLVING MULTIPLE JOINTS: ICD-10-CM

## 2018-04-02 DIAGNOSIS — C56.9 MALIGNANT NEOPLASM OF OVARY, UNSPECIFIED LATERALITY (HCC): ICD-10-CM

## 2018-04-02 DIAGNOSIS — R79.9 ABNORMAL FINDING OF BLOOD CHEMISTRY: ICD-10-CM

## 2018-04-02 DIAGNOSIS — Z00.00 ROUTINE GENERAL MEDICAL EXAMINATION AT A HEALTH CARE FACILITY: Primary | ICD-10-CM

## 2018-04-02 RX ORDER — DICLOFENAC SODIUM 10 MG/G
2 GEL TOPICAL 4 TIMES DAILY
Qty: 100 G | Refills: 12 | Status: SHIPPED | OUTPATIENT
Start: 2018-04-02 | End: 2018-05-31

## 2018-04-02 NOTE — PROGRESS NOTES
Juan Manuel Henriquez is a 69 y.o. female presents today for her medicare wellness visit. Pt is in Room # 6        Learning Assessment (baseline): Completed  Depression Screening: Completed  Fall Risk Screening: Completed  Abuse screening: Completed  ADL Assessment: Completed    1. Have you been to the ER, urgent care clinic since your last visit? Hospitalized since your last visit? No    2. Have you seen or consulted any other health care providers outside of the [address] [place] [address] since your last visit? Include any pap smears or colon screening.  No     Health Maintenance reviewed -

## 2018-04-02 NOTE — MR AVS SNAPSHOT
[address] [facility] 
 
 
 [address] [facility] [address] [facility] 83 [address] 
[phone] Patient: Erin Parekh MRN: [id number] :1950 Visit Information Date & Time Provider Department Dept. Phone Encounter #  
 2018  9:00 AM [facility] [phone] [id number] Follow-up Instructions Return in about 1 year (around 2019) for MWE, physical, labs at next visit. Upcoming Health Maintenance Date Due DTaP/Tdap/Td series (1 - Tdap) 2008 MEDICARE YEARLY EXAM 3/14/2018 COLONOSCOPY 2019 BREAST CANCER SCRN MAMMOGRAM 2020 GLAUCOMA SCREENING Q2Y 3/30/2020 Allergies as of 2018  Review Complete On: 2018 By: Diane Michael., DO No Known Allergies Current Immunizations  Reviewed on 10/14/2013 Name Date Influenza High Dose Vaccine PF 2015 Influenza Vaccine 10/27/2014 11:17 AM, 10/14/2013 Influenza Vaccine Split 10/24/2012, 12/10/2010 Influenza Vaccine Whole 2008 Pneumococcal Conjugate (PCV-13) 2015 TD Vaccine 2008 Tdap  Incomplete ZZZ-RETIRED (DO NOT USE) Pneumococcal Vaccine (Unspecified Type) 2008 Zoster 12/10/2010 Not reviewed this visit You Were Diagnosed With   
  
 Codes Comments Routine general medical examination at a health care facility    -  Primary ICD-10-CM: Z00.00 ICD-9-CM: V70.0 Malignant neoplasm of ovary, unspecified laterality ([facility]     ICD-10-CM: C56.9 ICD-9-CM: 183.0 Primary osteoarthritis involving multiple joints     ICD-10-CM: M15.0 ICD-9-CM: 715.09 Encounter for immunization     ICD-10-CM: N26 ICD-9-CM: V03.89 Abnormal finding of blood chemistry     ICD-10-CM: R79.9 ICD-9-CM: 790.6 Vitals BP Pulse Temp Resp Height(growth percentile) Weight(growth percentile)  148/67 (BP 1 Location: Right arm, BP Patient Position: Sitting) 83 97.3 °F (36.3 °C) (Oral) 14 5' 3\" (1.6 m) 118 lb (53.5 kg) SpO2 BMI OB Status Smoking Status 99% 20.9 kg/m2 Hysterectomy Never Smoker BMI and BSA Data Body Mass Index Body Surface Area  
 20.9 kg/m 2 1.54 m 2 Preferred Pharmacy Pharmacy Name Phone [facility], [address] [phone] Your Updated Medication List  
  
   
This list is accurate as of 4/2/18 10:01 AM.  Always use your most recent med list.  
  
  
  
  
 diclofenac 1 % Gel Commonly known as:  VOLTAREN Apply 2 g to affected area four (4) times daily. For hand pain Prescriptions Sent to Pharmacy Refills  
 diclofenac (VOLTAREN) 1 % gel 12 Sig: Apply 2 g to affected area four (4) times daily. For hand pain  
 Class: Normal  
 Pharmacy: [facility] [address] [place] [address], [address] Ph #: [phone] Route: Topical  
  
We Performed the Following AMB POC EKG ROUTINE W/ 12 LEADS, INTER & REP [27091 CPT(R)] TETANUS, DIPHTHERIA TOXOIDS AND ACELLULAR PERTUSSIS VACCINE (TDAP), IN INDIVIDS. >=7, IM [id number] CPT(R)] Follow-up Instructions Return in about 1 year (around 4/2/2019) for MWE, physical, labs at next visit. To-Do List   
 04/02/2018 Lab:  CBC WITH AUTOMATED DIFF   
  
 04/02/2018 Lab:  LIPID PANEL   
  
 04/02/2018 Lab:  METABOLIC PANEL, COMPREHENSIVE   
  
 04/02/2018 Lab:  TSH 3RD GENERATION   
  
 04/02/2018 Lab:  URINALYSIS W/ RFLX MICROSCOPIC Introducing [facility] & HEALTH SERVICES! Dear Obed Campos: Thank you for requesting a GLIIF account. Our records indicate that you already have an active GLIIF account. You can access your account anytime at [email or web address] [email or web address]/VFA Did you know that you can access your hospital and ER discharge instructions at any time in Roomixer? You can also review all of your test results from your hospital stay or ER visit. Additional Information If you have questions, please visit the Frequently Asked Questions section of the Roomixer website at [email or web address] Remember, Roomixer is NOT to be used for urgent needs. For medical emergencies, dial 911. Now available from your iPhone and Android! Please provide this summary of care documentation to your next provider. Your primary care clinician is listed as [address]. If you have any questions after today's visit, please call [phone].

## 2018-04-02 NOTE — PROGRESS NOTES
THE SUBSEQUENT MEDICARE ANNUAL WELLNESS VISIT PROGRESS NOTES    This is a Subsequent Medicare Annual Wellness Visit providing Personalized Prevention Plan Services (PPPS) (Performed 12 months after initial AWV and PPPS )    I have reviewed the patient's medical history in detail and updated the computerized patient record. Julissa Manrique is a 69 y.o.  female and presents for an subsequent annual wellness exam     Patient Active Problem List    Diagnosis Date Noted    Alopecia 01/13/2011    Ovarian cancer ([facility] 01/13/2010     Current Outpatient Prescriptions   Medication Sig Dispense Refill    diclofenac (VOLTAREN) 1 % gel Apply 2 g to affected area four (4) times daily. For hand pain 100 g 12     No Known Allergies  Past Medical History:   Diagnosis Date    Alopecia 1/13/2011    Halitosis 1/13/2011    Menopause     [facility]     [facility]     Past Surgical History:   Procedure Laterality Date    HX GYN      HX HYSTERECTOMY       Family History   Problem Relation Age of Onset    Heart Disease Mother      Social History   Substance Use Topics    Smoking status: Never Smoker    Smokeless tobacco: Never Used    Alcohol use No         ROS       All other systems reviewed and are negative. History     Past Medical History:   Diagnosis Date    Alopecia 1/13/2011    Halitosis 1/13/2011    Menopause     [facility]     02/1998      Past Surgical History:   Procedure Laterality Date    HX GYN      HX HYSTERECTOMY       Current Outpatient Prescriptions   Medication Sig Dispense Refill    diclofenac (VOLTAREN) 1 % gel Apply 2 g to affected area four (4) times daily.  For hand pain 100 g 12     No Known Allergies  Family History   Problem Relation Age of Onset    Heart Disease Mother      Social History   Substance Use Topics    Smoking status: Never Smoker    Smokeless tobacco: Never Used    Alcohol use No     Patient Active Problem List   Diagnosis Code    [facility] C56.9    Alopecia L65.9       Health Maintenance History  Immunizations reviewed, dtap due today  , pneumovax utd , flu utd , zosterutd   Colonoscopy: utd ,   Chest CT : na ,  Eye exam: utd   Mammo utd nl   Dexascan utd nl     Health Care Directive or Living Will: yes    Depression Risk Factor Screening:      Patient Health Questionnaire (PHQ-2)   Over the last 2 weeks, how often have you been bothered by any of the following problems? · Little interest or pleasure in doing things? · Not at all. [0]  · Feeling down, depressed, or hopeless? · Not at all. [0]    Total Score: 0/6  PHQ-2 Assessment Scoring:   A score of 2 or more requires further screening with the PHQ-9    Alcohol Risk Factor Screening:     Women: On any occasion during the past 3 months, have you had more than 3 drinks containing alcohol? Do you average more than 7 drinks per week? Men: On any occasion during the past 3 months, have you had more than 4 drinks containing alcohol? Do you average more than 14 drinks per week? Functional Ability and Level of Safety:     Hearing Loss    Hearing is good. Activities of Daily Living   Self-care. Requires assistance with: no ADLs    Fall Risk   No fall risk factors    Abuse Screen   None      Examination   Physical Examination  Vitals:    04/02/18 0923   BP: 148/67   Pulse: 83   Resp: 14   Temp: 97.3 °F (36.3 °C)   TempSrc: Oral   SpO2: 99%   Weight: 118 lb (53.5 kg)   Height: 5' 3\" (1.6 m)   PainSc:   0 - No pain     Body mass index is 20.9 kg/(m^2).     Evaluation of Cognitive Function:  Mood/affect:appropriate   Appearance: well groomed   Family member/caregiver input: na     alert, well appearing, and in no distress, oriented to person, place, and time and normal appearing weight    Patient Care Team:  Dave Conklin., DO as PCP - General    Advice/Referrals/Counseling/Plan:   Education and counseling provided:  Are appropriate based on today's review and evaluation  Include in education list (weight loss, physical activity, smoking cessation, fall prevention, and nutrition)  current treatment plan is effective, no change in therapy. I have discussed the diagnosis with the patient and the intended plan as seen in the above orders. The patient has received an after-visit summary and questions were answered concerning future plans. I have discussed medication side effects and warnings with the patient as well. I have reviewed the plan of care with the patient, accepted their input and they are in agreement with the treatment goals. Follow-up Disposition: Not on File    _____________________________________________________________    Problem Assessment    for treatment of   Chief Complaint   Patient presents with    Arthritis         SUBJECTIVE      Osteoarthritis and Chronic Pain:  Patient has osteoarthritis, primarily affecting the diffuse. Symptoms onset: problem is longstanding. Rheumatological ROS: no current joint or muscle symptoms, essentially pain-free. Response to treatment plan: stable. Additional Concerns:        Visit Vitals    /67 (BP 1 Location: Right arm, BP Patient Position: Sitting)    Pulse 83    Temp 97.3 °F (36.3 °C) (Oral)    Resp 14    Ht 5' 3\" (1.6 m)    Wt 118 lb (53.5 kg)    SpO2 99%    BMI 20.9 kg/m2     General:  Alert, cooperative, no distress, appears stated age. Head:  Normocephalic, without obvious abnormality, atraumatic. Eyes:  Conjunctivae/corneas clear. PERRL, EOMs intact. Fundi benign. Ears:  Normal TMs and external ear canals both ears. Nose: Nares normal. Septum midline. Mucosa normal. No drainage or sinus tenderness. Throat: Lips, mucosa, and tongue normal. Teeth and gums normal.   Neck: Supple, symmetrical, trachea midline, no adenopathy, thyroid: no enlargement/tenderness/nodules, no carotid bruit and no JVD. Back:   Symmetric, no curvature. ROM normal. No CVA tenderness.    Lungs:   Clear to auscultation bilaterally. Chest wall:  No tenderness or deformity. Heart:  Regular rate and rhythm, S1, S2 normal, no murmur, click, rub or gallop. Breast Exam:  No tenderness, masses, or nipple abnormality. Abdomen:   Soft, non-tender. Bowel sounds normal. No masses,  No organomegaly. Genitalia:  Normal female without lesion, discharge or tenderness. Rectal:  Normal tone,  no masses or tenderness  Guaiac negative stool. Extremities: Extremities normal, atraumatic, no cyanosis or edema. Pulses: 2+ and symmetric all extremities. Skin: Skin color, texture, turgor normal. No rashes or lesions. Lymph nodes: Cervical, supraclavicular, and axillary nodes normal.   Neurologic: CNII-XII intact. Normal strength, sensation and reflexes throughout. LABS     TESTS  ekg  nsr      Assessment/Plan:      miladis stable      Diagnoses and all orders for this visit:    1. Routine general medical examination at a health care facility  -     AMB POC EKG ROUTINE W/ 12 LEADS, INTER & REP  -     diclofenac (VOLTAREN) 1 % gel; Apply 2 g to affected area four (4) times daily. For hand pain  -     Tetanus, diphtheria toxoids and acellular pertussis (TDAP) vaccine, in individuals >=7 years, IM  -     LIPID PANEL; Future  -     CBC WITH AUTOMATED DIFF; Future  -     METABOLIC PANEL, COMPREHENSIVE; Future  -     URINALYSIS W/ RFLX MICROSCOPIC; Future  -     TSH 3RD GENERATION; Future    2. Malignant neoplasm of ovary, unspecified laterality (HCC)  -     AMB POC EKG ROUTINE W/ 12 LEADS, INTER & REP  -     diclofenac (VOLTAREN) 1 % gel; Apply 2 g to affected area four (4) times daily. For hand pain  -     Tetanus, diphtheria toxoids and acellular pertussis (TDAP) vaccine, in individuals >=7 years, IM  -     LIPID PANEL; Future  -     CBC WITH AUTOMATED DIFF; Future  -     METABOLIC PANEL, COMPREHENSIVE; Future  -     URINALYSIS W/ RFLX MICROSCOPIC; Future  -     TSH 3RD GENERATION; Future    3.  Primary osteoarthritis involving multiple joints  -     AMB POC EKG ROUTINE W/ 12 LEADS, INTER & REP  -     diclofenac (VOLTAREN) 1 % gel; Apply 2 g to affected area four (4) times daily. For hand pain  -     Tetanus, diphtheria toxoids and acellular pertussis (TDAP) vaccine, in individuals >=7 years, IM  -     LIPID PANEL; Future  -     CBC WITH AUTOMATED DIFF; Future  -     METABOLIC PANEL, COMPREHENSIVE; Future  -     URINALYSIS W/ RFLX MICROSCOPIC; Future  -     TSH 3RD GENERATION; Future    4. Encounter for immunization  -     AMB POC EKG ROUTINE W/ 12 LEADS, INTER & REP  -     diclofenac (VOLTAREN) 1 % gel; Apply 2 g to affected area four (4) times daily. For hand pain  -     Tetanus, diphtheria toxoids and acellular pertussis (TDAP) vaccine, in individuals >=7 years, IM  -     LIPID PANEL; Future  -     CBC WITH AUTOMATED DIFF; Future  -     METABOLIC PANEL, COMPREHENSIVE; Future  -     URINALYSIS W/ RFLX MICROSCOPIC; Future  -     TSH 3RD GENERATION; Future    5. Abnormal finding of blood chemistry   -     LIPID PANEL;  Future          Lab review: labs are reviewed, up to date and normal

## 2018-04-06 ENCOUNTER — TELEPHONE (OUTPATIENT)
Dept: FAMILY MEDICINE CLINIC | Age: 68
End: 2018-04-06

## 2018-04-06 NOTE — TELEPHONE ENCOUNTER
Mr Charlesus MaderaLeo requesting a call back regarding recommendations to help patient with back pain until OV on 4/10/18.    Please advise

## 2018-05-31 ENCOUNTER — OFFICE VISIT (OUTPATIENT)
Dept: FAMILY MEDICINE CLINIC | Age: 68
End: 2018-05-31

## 2018-05-31 VITALS
BODY MASS INDEX: 20.73 KG/M2 | HEART RATE: 95 BPM | HEIGHT: 63 IN | SYSTOLIC BLOOD PRESSURE: 169 MMHG | WEIGHT: 117 LBS | DIASTOLIC BLOOD PRESSURE: 58 MMHG | TEMPERATURE: 98.3 F | OXYGEN SATURATION: 97 % | RESPIRATION RATE: 16 BRPM

## 2018-05-31 DIAGNOSIS — H26.9 CATARACT OF BOTH EYES, UNSPECIFIED CATARACT TYPE: Primary | ICD-10-CM

## 2018-05-31 DIAGNOSIS — K59.00 CONSTIPATION, UNSPECIFIED CONSTIPATION TYPE: ICD-10-CM

## 2018-05-31 DIAGNOSIS — C56.9 MALIGNANT NEOPLASM OF OVARY, UNSPECIFIED LATERALITY (HCC): ICD-10-CM

## 2018-05-31 DIAGNOSIS — M15.9 PRIMARY OSTEOARTHRITIS INVOLVING MULTIPLE JOINTS: ICD-10-CM

## 2018-05-31 RX ORDER — DICLOFENAC SODIUM 10 MG/G
2 GEL TOPICAL 4 TIMES DAILY
Qty: 100 G | Refills: 12 | Status: SHIPPED | OUTPATIENT
Start: 2018-05-31 | End: 2019-02-28 | Stop reason: SDUPTHER

## 2018-05-31 RX ORDER — POLYETHYLENE GLYCOL 3350 17 G/17G
17 POWDER, FOR SOLUTION ORAL DAILY
Qty: 100 EACH | Refills: 12 | Status: SHIPPED | OUTPATIENT
Start: 2018-05-31

## 2018-05-31 NOTE — MR AVS SNAPSHOT
[address] [place] [address] [address] [place] [address] [phone] 
[phone] Patient: Frances Rodriguez MRN: [id number] Visit Information Date & Time Provider Department Dept. Phone Encounter #  
 2018 12:30 PM Christy Elena, [address] [place] [address] [phone] [id number] Follow-up Instructions Return in about 6 months (around 2018) for MWE, physical, labs today, EKG next visit. Follow-up and Disposition History Upcoming Health Maintenance Date Due Influenza Age 5 to Adult 2018 COLONOSCOPY 2019 MEDICARE YEARLY EXAM 4/3/2019 BREAST CANCER SCRN MAMMOGRAM 2020 GLAUCOMA SCREENING Q2Y 3/30/2020 DTaP/Tdap/Td series (2 - Td) 2028 Allergies as of 2018  Review Complete On: 2018 By: Christy Elena, DO No Known Allergies Current Immunizations  Reviewed on 10/14/2013 Name Date Influenza High Dose Vaccine PF 2015 Influenza Vaccine 10/27/2014 11:17 AM, 10/14/2013 Influenza Vaccine Split 10/24/2012, 12/10/2010 Influenza Vaccine Whole 2008 Pneumococcal Conjugate (PCV-13) 2015 TD Vaccine 2008 Tdap 2018 ZZZ-RETIRED (DO NOT USE) Pneumococcal Vaccine (Unspecified Type) 2008 Zoster 12/10/2010 Not reviewed this visit You Were Diagnosed With   
  
 Codes Comments Cataract of both eyes, unspecified cataract type    -  Primary ICD-10-CM: H26.9 ICD-9-CM: 366.9 Malignant neoplasm of ovary, unspecified laterality ([facility]     ICD-10-CM: C56.9 ICD-9-CM: 183.0 Primary osteoarthritis involving multiple joints     ICD-10-CM: M15.0 ICD-9-CM: 715.09 Constipation, unspecified constipation type     ICD-10-CM: K59.00 ICD-9-CM: 564.00 Vitals BP Pulse Temp Resp Height(growth percentile) Weight(growth percentile)  169/58 (BP 1 Location: Right arm, BP Patient Position: Sitting) 95 98.3 °F (36.8 °C) (Oral) 16 5' 3\" (1.6 m) 117 lb (53.1 kg) SpO2 BMI OB Status Smoking Status 97% 20.73 kg/m2 Hysterectomy Never Smoker BMI and BSA Data Body Mass Index Body Surface Area 20.73 kg/m 2 1.54 m 2 Preferred Pharmacy Pharmacy Name [facility], [address] [phone] Your Updated Medication List  
  
   
This list is accurate as of 5/31/18  1:24 PM.  Always use your most recent med list.  
  
  
  
  
 diclofenac 1 % Gel Commonly known as:  VOLTAREN Apply 2 g to affected area four (4) times daily. For hand pain  
  
 polyethylene glycol 17 gram packet Commonly known as:  Kayley Plane Take 1 Packet by mouth daily. Prescriptions Sent to Pharmacy Refills  
 diclofenac (VOLTAREN) 1 % gel 12 Sig: Apply 2 g to affected area four (4) times daily. For hand pain  
 Class: Normal  
 Pharmacy: [facility] [address] [place] [address] [facility] [address] [place] [address] Ph #: [phone] Route: Topical  
 polyethylene glycol (MIRALAX) 17 gram packet 12 Sig: Take 1 Packet by mouth daily. Class: Normal  
 Pharmacy: [facility] [address] [place] [address], [address] [place] [address] Ph #: [phone] Route: Oral  
  
Follow-up Instructions Return in about 6 months (around 11/30/2018) for MWE, physical, labs today, EKG next visit. Introducing [facility]! Dear Jonathon Olivier: Thank you for requesting a [employer] account. Our records indicate that you already have an active [employer] account. You can access your account anytime at [email or web address] Did you know that you can access your hospital and ER discharge instructions at any time in [employer]? You can also review all of your test results from your hospital stay or ER visit. Additional Information If you have questions, please visit the Frequently Asked Questions section of the [employer] website at [email or web address] Remember, [employer] is NOT to be used for urgent needs. For medical emergencies, dial 911. Now available from your iPhone and Android! Please provide this summary of care documentation to your next provider. Your primary care clinician is listed as [id number] [place] [address]. If you have any questions after today's visit, please call [phone].

## 2018-05-31 NOTE — PROGRESS NOTES
Preoperative Evaluation    Date of Exam: 05/30/2018      Marquise Valverde  Is a 77 y.o.  female  who presents for preoperative evaluation and management of     Chief Complaint   Patient presents with    Cataract       Ovarian cancer currenly in remission         Patient Active Problem List    Diagnosis Date Noted    Primary osteoarthritis involving multiple joints 05/31/2018    Alopecia 01/13/2011    Ovarian cancer ([facility] 01/13/2010       No Known Allergies  Past Medical History:   Diagnosis Date    Alopecia 1/13/2011    Halitosis 1/13/2011    Menopause     Ovarian cancer ([facility]     02/1998     Past Surgical History:   Procedure Laterality Date    HX GYN      HX HYSTERECTOMY       Family History   Problem Relation Age of Onset    Heart Disease Mother      Social History   Substance Use Topics    Smoking status: Never Smoker    Smokeless tobacco: Never Used    Alcohol use No       Procedure/Surgery:right eye cataract   Date of Procedure/Surgery: 6/14/18  Surgeon: [facility]   Primary Physician: [address] [place] [address]  Latex Allergy: no    Problem List:     Patient Active Problem List    Diagnosis Date Noted    Primary osteoarthritis involving multiple joints 05/31/2018    Alopecia 01/13/2011    Ovarian cancer ([facility] 01/13/2010     Medical History:     Past Medical History:   Diagnosis Date    Alopecia 1/13/2011    Halitosis 1/13/2011    Menopause     Ovarian cancer ([facility]     02/1998     Allergies:   No Known Allergies   Medications:     Current Outpatient Prescriptions   Medication Sig    diclofenac (VOLTAREN) 1 % gel Apply 2 g to affected area four (4) times daily. For hand pain     No current facility-administered medications for this visit.       Surgical History:     Past Surgical History:   Procedure Laterality Date    HX GYN      HX HYSTERECTOMY       Social History:     Social History     Social History    Marital status:      Spouse name: N/A    Number of children: N/A    Years of education: N/A     Occupational History          Social History Main Topics    Smoking status: Never Smoker    Smokeless tobacco: Never Used    Alcohol use No    Drug use: No    Sexual activity: Yes     Partners: Male     Other Topics Concern    None     Social History Narrative       Recent use of: NSAIDs    Tetanus up to date: last tetanus booster within 10 years      Anesthesia Complications: None  History of abnormal bleeding : None  History of Blood Transfusions: no  Health Care Directive or Living Will: yes    REVIEW OF SYSTEMS:      General: negative for - chills, fatigue, fever, weight change  Psych: negative for - anxiety, depression, irritability or mood swings  ENT: negative for - headaches, hearing change, nasal congestion, oral lesions, sneezing or sore throat  Heme/ Lymph: negative for - bleeding problems, bruising, pallor or swollen lymph nodes  Endo: negative for - hot flashes, polydipsia/polyuria or temperature intolerance  Resp: negative for - cough, shortness of breath or wheezing  CV: negative for - chest pain, edema or palpitations  GI: negative for - abdominal pain, change in bowel habits, constipation, diarrhea or nausea/vomiting  : negative for - dysuria, hematuria, incontinence, pelvic pain or vulvar/vaginal symptoms  MSK: negative for - joint pain, joint swelling or muscle pain  Neuro: negative for - confusion, headaches, seizures or weakness  Derm: negative for - dry skin, hair changes, rash or skin lesion changes    EXAM:   Vitals:    05/31/18 1254   BP: 169/58   Pulse: 95   Resp: 16   Temp: 98.3 °F (36.8 °C)   TempSrc: Oral   SpO2: 97%   Weight: 117 lb (53.1 kg)   Height: 5' 3\" (1.6 m)   PainSc:   0 - No pain       alert, well appearing, and in no distress, oriented to person, place, and time and normal appearing weight  Mental status - alert, oriented to person, place, and time  Eyes - pupils equal and reactive, extraocular eye movements intact  Ears - bilateral TM's and external ear canals normal  Nose - normal and patent, no erythema, discharge or polyps  Mouth - mucous membranes moist, pharynx normal without lesions  Neck - supple, no significant adenopathy  Lymphatics - no palpable lymphadenopathy, no hepatosplenomegaly  Chest - clear to auscultation, no wheezes, rales or rhonchi, symmetric air entry  Heart - normal rate, regular rhythm, normal S1, S2, no murmurs, rubs, clicks or gallops  Abdomen - soft, nontender, nondistended, no masses or organomegaly  Back exam - full range of motion, no tenderness, palpable spasm or pain on motion  Neurological - alert, oriented, normal speech, no focal findings or movement disorder noted  Musculoskeletal - no joint tenderness, deformity or swelling  Extremities - peripheral pulses normal, no pedal edema, no clubbing or cyanosis  Skin - normal coloration and turgor, no rashes, no suspicious skin lesions noted      DIAGNOSTICS:   1. EKG: EKG FINDINGS - normal EKG, normal sinus rhythm, unchanged from previous tracings  2. CXR:   3. Labs:   Component      Latest Ref Rng & Units 5/31/2018 5/31/2018 5/31/2018 5/31/2018           1:16 AM  1:16 AM  1:16 AM  1:16 AM   WBC      3.4 - 10.8 x10E3/uL       RBC      3.77 - 5.28 x10E6/uL       HGB      11.1 - 15.9 g/dL       HCT      34.0 - 46.6 %       MCV      79 - 97 fL       MCH      26.6 - 33.0 pg       MCHC      31.5 - 35.7 g/dL       RDW      12.3 - 15.4 %       PLATELET      369 - 099 x10E3/uL       NEUTROPHILS      Not Estab. %       Lymphocytes      Not Estab. %       MONOCYTES      Not Estab. %       EOSINOPHILS      Not Estab. %       BASOPHILS      Not Estab. %       ABS. NEUTROPHILS      1.4 - 7.0 x10E3/uL       Abs Lymphocytes      0.7 - 3.1 x10E3/uL       ABS. MONOCYTES      0.1 - 0.9 x10E3/uL       ABS. EOSINOPHILS      0.0 - 0.4 x10E3/uL       ABS.  BASOPHILS      0.0 - 0.2 x10E3/uL       IMMATURE GRANULOCYTES      Not Estab. % ABS. IMM. GRANS.      0.0 - 0.1 x10E3/uL       Glucose      65 - 99 mg/dL    96   BUN      8 - 27 mg/dL    16   Creatinine      0.57 - 1.00 mg/dL    0.86   GFR est non-AA      >59 mL/min/1.73    69   GFR est AA      >59 mL/min/1.73    79   BUN/Creatinine ratio      12 - 28    19   Sodium      134 - 144 mmol/L    139   Potassium      3.5 - 5.2 mmol/L    4.3   Chloride      96 - 106 mmol/L    98   CO2      18 - 29 mmol/L    25   Calcium      8.7 - 10.3 mg/dL    9.9   Protein, total      6.0 - 8.5 g/dL    6.9   Albumin      3.5 - 4.8 g/dL    4.5   GLOBULIN, TOTAL      1.5 - 4.5 g/dL    2.4   A-G Ratio      1.2 - 2.2    1.9   Bilirubin, total      0.0 - 1.2 mg/dL    0.4   Alk.  phosphatase      39 - 117 IU/L    68   AST      0 - 40 IU/L    29   ALT (SGPT)      0 - 32 IU/L    16   Specific Gravity      1.005 - 1.030   1.010    pH (UA)      5.0 - 7.5   5.5    Color      Yellow   Yellow    Appearance      Clear   Clear    Leukocyte Esterase      Negative   Negative    Protein      Negative/Trace   Negative    Glucose      Negative   Negative    Ketone      Negative   Negative    Blood      Negative   Negative    Bilirubin      Negative   Negative    Urobilinogen      0.2 - 1.0 mg/dL   0.2    Nitrites      Negative   Negative    Microscopic Examination         Comment    Cholesterol, total      100 - 199 mg/dL  182     Triglyceride      0 - 149 mg/dL  45     HDL Cholesterol      >39 mg/dL  78     VLDL, calculated      5 - 40 mg/dL  9     LDL, calculated      0 - 99 mg/dL  95     TSH      0.450 - 4.500 uIU/mL 3.590        Component      Latest Ref Rng & Units 5/31/2018           1:16 AM   WBC      3.4 - 10.8 x10E3/uL 6.7   RBC      3.77 - 5.28 x10E6/uL 4.34   HGB      11.1 - 15.9 g/dL 13.1   HCT      34.0 - 46.6 % 40.2   MCV      79 - 97 fL 93   MCH      26.6 - 33.0 pg 30.2   MCHC      31.5 - 35.7 g/dL 32.6   RDW      12.3 - 15.4 % 13.3   PLATELET      248 - 127 x10E3/uL 232   NEUTROPHILS      Not Estab. % 64 Lymphocytes      Not Estab. % 25   MONOCYTES      Not Estab. % 6   EOSINOPHILS      Not Estab. % 4   BASOPHILS      Not Estab. % 1   ABS. NEUTROPHILS      1.4 - 7.0 x10E3/uL 4.3   Abs Lymphocytes      0.7 - 3.1 x10E3/uL 1.7   ABS. MONOCYTES      0.1 - 0.9 x10E3/uL 0.4   ABS. EOSINOPHILS      0.0 - 0.4 x10E3/uL 0.2   ABS. BASOPHILS      0.0 - 0.2 x10E3/uL 0.1   IMMATURE GRANULOCYTES      Not Estab. % 0   ABS. IMM. GRANS.      0.0 - 0.1 x10E3/uL 0.0   Glucose      65 - 99 mg/dL    BUN      8 - 27 mg/dL    Creatinine      0.57 - 1.00 mg/dL    GFR est non-AA      >59 mL/min/1.73    GFR est AA      >59 mL/min/1.73    BUN/Creatinine ratio      12 - 28    Sodium      134 - 144 mmol/L    Potassium      3.5 - 5.2 mmol/L    Chloride      96 - 106 mmol/L    CO2      18 - 29 mmol/L    Calcium      8.7 - 10.3 mg/dL    Protein, total      6.0 - 8.5 g/dL    Albumin      3.5 - 4.8 g/dL    GLOBULIN, TOTAL      1.5 - 4.5 g/dL    A-G Ratio      1.2 - 2.2    Bilirubin, total      0.0 - 1.2 mg/dL    Alk.  phosphatase      39 - 117 IU/L    AST      0 - 40 IU/L    ALT (SGPT)      0 - 32 IU/L    Specific Gravity      1.005 - 1.030    pH (UA)      5.0 - 7.5    Color      Yellow    Appearance      Clear    Leukocyte Esterase      Negative    Protein      Negative/Trace    Glucose      Negative    Ketone      Negative    Blood      Negative    Bilirubin      Negative    Urobilinogen      0.2 - 1.0 mg/dL    Nitrites      Negative    Microscopic Examination          Cholesterol, total      100 - 199 mg/dL    Triglyceride      0 - 149 mg/dL    HDL Cholesterol      >39 mg/dL    VLDL, calculated      5 - 40 mg/dL    LDL, calculated      0 - 99 mg/dL    TSH      0.450 - 4.500 uIU/mL        IMPRESSION:           No contraindications to planned surgery    [id number] [facility], DO

## 2018-05-31 NOTE — PROGRESS NOTES
Marquise Valverde is a 77 y.o. female presents today for pre op exam. Patient is scheduled to have cataract surgery on 6/14/18 by Dr. Reymundo Ortega. Patient also reports of arthritic pain in her right hand, left fingers, bilateral knees, bilateral legs, and lower back pain. Patient reports that affected areas hurt with movement. Pt is in Room # 4        1. Have you been to the ER, urgent care clinic since your last visit? Hospitalized since your last visit? No    2. Have you seen or consulted any other health care providers outside of the [address] [place] [address] since your last visit? Include any pap smears or colon screening. No     Health Maintenance reviewed - .

## 2018-06-01 LAB
ALBUMIN SERPL-MCNC: 4.5 G/DL (ref 3.5–4.8)
ALBUMIN/GLOB SERPL: 1.9 {RATIO} (ref 1.2–2.2)
ALP SERPL-CCNC: 68 IU/L (ref 39–117)
ALT SERPL-CCNC: 16 IU/L (ref 0–32)
APPEARANCE UR: CLEAR
AST SERPL-CCNC: 29 IU/L (ref 0–40)
BASOPHILS # BLD AUTO: 0.1 X10E3/UL (ref 0–0.2)
BASOPHILS NFR BLD AUTO: 1 %
BILIRUB SERPL-MCNC: 0.4 MG/DL (ref 0–1.2)
BILIRUB UR QL STRIP: NEGATIVE
BUN SERPL-MCNC: 16 MG/DL (ref 8–27)
BUN/CREAT SERPL: 19 (ref 12–28)
CALCIUM SERPL-MCNC: 9.9 MG/DL (ref 8.7–10.3)
CHLORIDE SERPL-SCNC: 98 MMOL/L (ref 96–106)
CHOLEST SERPL-MCNC: 182 MG/DL (ref 100–199)
CO2 SERPL-SCNC: 25 MMOL/L (ref 18–29)
COLOR UR: YELLOW
CREAT SERPL-MCNC: 0.86 MG/DL (ref 0.57–1)
EOSINOPHIL # BLD AUTO: 0.2 X10E3/UL (ref 0–0.4)
EOSINOPHIL NFR BLD AUTO: 4 %
ERYTHROCYTE [DISTWIDTH] IN BLOOD BY AUTOMATED COUNT: 13.3 % (ref 12.3–15.4)
GFR SERPLBLD CREATININE-BSD FMLA CKD-EPI: 69 ML/MIN/1.73
GFR SERPLBLD CREATININE-BSD FMLA CKD-EPI: 79 ML/MIN/1.73
GLOBULIN SER CALC-MCNC: 2.4 G/DL (ref 1.5–4.5)
GLUCOSE SERPL-MCNC: 96 MG/DL (ref 65–99)
GLUCOSE UR QL: NEGATIVE
HCT VFR BLD AUTO: 40.2 % (ref 34–46.6)
HDLC SERPL-MCNC: 78 MG/DL
HGB BLD-MCNC: 13.1 G/DL (ref 11.1–15.9)
HGB UR QL STRIP: NEGATIVE
IMM GRANULOCYTES # BLD: 0 X10E3/UL (ref 0–0.1)
IMM GRANULOCYTES NFR BLD: 0 %
KETONES UR QL STRIP: NEGATIVE
LDLC SERPL CALC-MCNC: 95 MG/DL (ref 0–99)
LEUKOCYTE ESTERASE UR QL STRIP: NEGATIVE
LYMPHOCYTES # BLD AUTO: 1.7 X10E3/UL (ref 0.7–3.1)
LYMPHOCYTES NFR BLD AUTO: 25 %
MCH RBC QN AUTO: 30.2 PG (ref 26.6–33)
MCHC RBC AUTO-ENTMCNC: 32.6 G/DL (ref 31.5–35.7)
MCV RBC AUTO: 93 FL (ref 79–97)
MICRO URNS: NORMAL
MONOCYTES # BLD AUTO: 0.4 X10E3/UL (ref 0.1–0.9)
MONOCYTES NFR BLD AUTO: 6 %
NEUTROPHILS # BLD AUTO: 4.3 X10E3/UL (ref 1.4–7)
NEUTROPHILS NFR BLD AUTO: 64 %
NITRITE UR QL STRIP: NEGATIVE
PH UR STRIP: 5.5 [PH] (ref 5–7.5)
PLATELET # BLD AUTO: 232 X10E3/UL (ref 150–379)
POTASSIUM SERPL-SCNC: 4.3 MMOL/L (ref 3.5–5.2)
PROT SERPL-MCNC: 6.9 G/DL (ref 6–8.5)
PROT UR QL STRIP: NEGATIVE
RBC # BLD AUTO: 4.34 X10E6/UL (ref 3.77–5.28)
SODIUM SERPL-SCNC: 139 MMOL/L (ref 134–144)
SP GR UR: 1.01 (ref 1–1.03)
TRIGL SERPL-MCNC: 45 MG/DL (ref 0–149)
TSH SERPL DL<=0.005 MIU/L-ACNC: 3.59 UIU/ML (ref 0.45–4.5)
UROBILINOGEN UR STRIP-MCNC: 0.2 MG/DL (ref 0.2–1)
VLDLC SERPL CALC-MCNC: 9 MG/DL (ref 5–40)
WBC # BLD AUTO: 6.7 X10E3/UL (ref 3.4–10.8)

## 2018-08-06 ENCOUNTER — TELEPHONE (OUTPATIENT)
Dept: FAMILY MEDICINE CLINIC | Age: 68
End: 2018-08-06

## 2018-08-06 NOTE — TELEPHONE ENCOUNTER
Spoke with patient's  who reports that patient is unable to come in as she is at work but complain of frequent urination. Caller asked if Dr. Kee Pineda could just send a prescription to the pharmacy, caller was advised that patient would need an appointment. Due to patient's current workcase patient is unable to schedule today. Caller recommended that patient come in tomorrow morning at 730 to be evaluated. Caller agrees so that patient can be treated. Patient scheduled.

## 2018-08-06 NOTE — TELEPHONE ENCOUNTER
Pt  called in because the pt believes she has a bladder infection and she would like for  to send in a prescription for her.  Please assist

## 2018-08-07 ENCOUNTER — HOSPITAL ENCOUNTER (OUTPATIENT)
Dept: LAB | Age: 68
Discharge: HOME | End: 2018-08-07
Payer: MEDICARE

## 2018-08-07 ENCOUNTER — OFFICE VISIT (OUTPATIENT)
Dept: FAMILY MEDICINE CLINIC | Age: 68
End: 2018-08-07

## 2018-08-07 VITALS
DIASTOLIC BLOOD PRESSURE: 71 MMHG | BODY MASS INDEX: 20.18 KG/M2 | OXYGEN SATURATION: 98 % | WEIGHT: 118.2 LBS | TEMPERATURE: 95.9 F | SYSTOLIC BLOOD PRESSURE: 127 MMHG | HEART RATE: 99 BPM | HEIGHT: 64 IN | RESPIRATION RATE: 17 BRPM

## 2018-08-07 DIAGNOSIS — R35.0 FREQUENT URINATION: ICD-10-CM

## 2018-08-07 DIAGNOSIS — R35.0 FREQUENT URINATION: Primary | ICD-10-CM

## 2018-08-07 DIAGNOSIS — R30.9 PAINFUL URINATION: ICD-10-CM

## 2018-08-07 LAB
BILIRUB UR QL STRIP: NEGATIVE
GLUCOSE UR-MCNC: NORMAL MG/DL
KETONES P FAST UR STRIP-MCNC: NEGATIVE MG/DL
PH UR STRIP: 6.5 [PH] (ref 4.6–8)
PROT UR QL STRIP: NEGATIVE
SP GR UR STRIP: 1.01 (ref 1–1.03)
UA UROBILINOGEN AMB POC: NORMAL (ref 0.2–1)
URINALYSIS CLARITY POC: CLEAR
URINALYSIS COLOR POC: NORMAL
URINE BLOOD POC: NORMAL
URINE LEUKOCYTES POC: NEGATIVE
URINE NITRITES POC: POSITIVE

## 2018-08-07 PROCEDURE — 87086 URINE CULTURE/COLONY COUNT: CPT | Performed by: FAMILY MEDICINE

## 2018-08-07 RX ORDER — PHENAZOPYRIDINE HYDROCHLORIDE 100 MG/1
100 TABLET, FILM COATED ORAL
Qty: 9 TAB | Refills: 0 | Status: SHIPPED | OUTPATIENT
Start: 2018-08-07 | End: 2018-08-10

## 2018-08-07 RX ORDER — CIPROFLOXACIN 250 MG/1
250 TABLET, FILM COATED ORAL EVERY 12 HOURS
Qty: 20 TAB | Refills: 0 | Status: SHIPPED | OUTPATIENT
Start: 2018-08-07 | End: 2018-08-17

## 2018-08-07 NOTE — MR AVS SNAPSHOT
[address] [id number] [facility] [address] [facility] [address] 
[phone] Patient: Arturo Trejo MRN: [id number] :1950 Visit Information Date & Time Provider Department Dept. Phone Encounter #  
 2018  7:30  [place] [facility] [phone] [id number] Follow-up Instructions Return if symptoms worsen or fail to improve. Follow-up and Disposition History Upcoming Health Maintenance Date Due Influenza Age 5 to Adult 2018 COLONOSCOPY 2019 MEDICARE YEARLY EXAM 4/3/2019 BREAST CANCER SCRN MAMMOGRAM 2020 GLAUCOMA SCREENING Q2Y 3/30/2020 DTaP/Tdap/Td series (2 - Td) 2028 Allergies as of 2018  Review Complete On: 2018 By: Arnie Flor LPN No Known Allergies Current Immunizations  Reviewed on 10/14/2013 Name Date Influenza High Dose Vaccine PF 2015 Influenza Vaccine 10/27/2014 11:17 AM, 10/14/2013 Influenza Vaccine Split 10/24/2012, 12/10/2010 Influenza Vaccine Whole 2008 Pneumococcal Conjugate (PCV-13) 2015 TD Vaccine 2008 Tdap 2018 ZZZ-RETIRED (DO NOT USE) Pneumococcal Vaccine (Unspecified Type) 2008 Zoster 12/10/2010 Not reviewed this visit You Were Diagnosed With   
  
 Codes Comments Frequent urination    -  Primary ICD-10-CM: R35.0 ICD-9-CM: 788.41 Painful urination     ICD-10-CM: R30.9 ICD-9-CM: 719. 1 Vitals BP Pulse Temp Resp Height(growth percentile) Weight(growth percentile) 127/71 (BP 1 Location: Right arm, BP Patient Position: Sitting) 99 95.9 °F (35.5 °C) (Oral) 17 5' 4\" (1.626 m) 118 lb 3.2 oz (53.6 kg) SpO2 BMI OB Status Smoking Status 98% 20.29 kg/m2 Hysterectomy Never Smoker BMI and BSA Data Body Mass Index Body Surface Area  
 20.29 kg/m 2 1.56 m 2 Preferred Pharmacy Pharmacy Name Phone [facility], [address] [place] [address] [phone] Your Updated Medication List  
  
   
This list is accurate as of 8/7/18  8:02 AM.  Always use your most recent med list. ALLEGRA-D 12 HOUR  mg Tb12 Generic drug:  fexofenadine-pseudoephedrine Take 1 Tab by mouth every twelve (12) hours. ciprofloxacin HCl 250 mg tablet Commonly known as:  CIPRO Take 1 Tab by mouth every twelve (12) hours for 10 days. diclofenac 1 % Gel Commonly known as:  VOLTAREN Apply 2 g to affected area four (4) times daily. For hand pain  
  
 phenazopyridine 100 mg tablet Commonly known as:  PYRIDIUM Take 1 Tab by mouth three (3) times daily (after meals) for 3 days. polyethylene glycol 17 gram packet Commonly known as:  F[place] Hallmark Take 1 Packet by mouth daily. Prescriptions Sent to Pharmacy Refills  
 ciprofloxacin HCl (CIPRO) 250 mg tablet 0 Sig: Take 1 Tab by mouth every twelve (12) hours for 10 days. Class: Normal  
 Pharmacy: [address] [place] [address] Ph #: [phone] Route: Oral  
 phenazopyridine (PYRIDIUM) 100 mg tablet 0 Sig: Take 1 Tab by mouth three (3) times daily (after meals) for 3 days. Class: Normal  
 Pharmacy: [address] [place] [address] Ph #: [phone] Route: Oral  
  
We Performed the Following AMB POC URINALYSIS DIP STICK AUTO W/O MICRO [99232 CPT(R)] Follow-up Instructions Return if symptoms worsen or fail to improve. To-Do List   
 08/07/2018 Microbiology:  CULTURE, URINE Introducing [facility] HE[facility]! Dear Valentín Batista: Thank you for requesting a [employer] account. Our records indicate that you already have an active [employer] account.   You can access your account anytime at [email or web address]. [employer]/"[email or web address] Did you know that you can access your hospital and ER discharge instructions at any time in [employer]? You can also review all of your test results from your hospital stay or ER visit. Additional Information If you have questions, please visit the Frequently Asked Questions section of the [employer] website at [email or web address] Remember, [employer] is NOT to be used for urgent needs. For medical emergencies, dial 911. Now available from your iPhone and Android! Please provide this summary of care documentation to your next provider. Your primary care clinician is listed as [phone] [facility]. If you have any questions after today's visit, please call [phone].

## 2018-08-07 NOTE — PROGRESS NOTES
Malka Chilel is a 77 y.o.  female and presents with    Chief Complaint   Patient presents with    Bladder Infection           Subjective:  Urinary Tract Infection  Patient complains of urgency. Onset was 2 days ago, stable since that time. Patient complains of none. Patient denies back pain. There is not any concern of sexual abuse. There is not a history of trauma to the genital area. Patient does not have a history of recurrent UTI. Patient does not have a history of pyelonephritis. Additional Concerns:          Patient Active Problem List    Diagnosis Date Noted    Primary osteoarthritis involving multiple joints 05/31/2018    Alopecia 01/13/2011    Ovarian cancer ([facility] 01/13/2010     Current Outpatient Prescriptions   Medication Sig Dispense Refill    ciprofloxacin HCl (CIPRO) 250 mg tablet Take 1 Tab by mouth every twelve (12) hours for 10 days. 20 Tab 0    fexofenadine-pseudoephedrine (ALLEGRA-D 12 HOUR)  mg Tb12 Take 1 Tab by mouth every twelve (12) hours.  diclofenac (VOLTAREN) 1 % gel Apply 2 g to affected area four (4) times daily. For hand pain 100 g 12    polyethylene glycol (MIRALAX) 17 gram packet Take 1 Packet by mouth daily. 100 Each 12     No Known Allergies  Past Medical History:   Diagnosis Date    Alopecia 1/13/2011    Halitosis 1/13/2011    Menopause     Ovarian cancer ([facility].)     02/1998     Past Surgical History:   Procedure Laterality Date    HX GYN      HX HYSTERECTOMY       Family History   Problem Relation Age of Onset    Heart Disease Mother      Social History   Substance Use Topics    Smoking status: Never Smoker    Smokeless tobacco: Never Used    Alcohol use No       ROS       All other systems reviewed and are negative.       Objective:  Vitals:    08/07/18 0753   BP: 127/71   Pulse: 99   Resp: 17   Temp: 95.9 °F (35.5 °C)   TempSrc: Oral   SpO2: 98%   Weight: 118 lb 3.2 oz (53.6 kg)   Height: 5' 4\" (1.626 m)   PainSc:   0 - No pain alert, well appearing, and in no distress and oriented to person, place, and time  Chest - clear to auscultation, no wheezes, rales or rhonchi, symmetric air entry  Heart - normal rate, regular rhythm, normal S1, S2, no murmurs, rubs, clicks or gallops  Abdomen - soft, nontender, nondistended, no masses or organomegaly        LABS     Urine c/w infection  TESTS      Assessment/Plan:    ossible uti  Will cover with cipro and f/u INI   1 wk      Lab review:     Diagnoses and all orders for this visit:    1. Frequent urination  -     AMB POC URINALYSIS DIP STICK AUTO W/O MICRO  -     CULTURE, URINE; Future  -     ciprofloxacin HCl (CIPRO) 250 mg tablet; Take 1 Tab by mouth every twelve (12) hours for 10 days. 2. Painful urination  -     AMB POC URINALYSIS DIP STICK AUTO W/O MICRO  -     CULTURE, URINE; Future  -     ciprofloxacin HCl (CIPRO) 250 mg tablet; Take 1 Tab by mouth every twelve (12) hours for 10 days. I have discussed the diagnosis with the patient and the intended plan as seen in the above orders. The patient has received an after-visit summary and questions were answered concerning future plans. I have discussed medication side effects and warnings with the patient as well. I have reviewed the plan of care with the patient, accepted their input and they are in agreement with the treatment goals. Follow-up Disposition:  Return if symptoms worsen or fail to improve.

## 2018-08-07 NOTE — PROGRESS NOTES
Room #      SUBJECTIVE:    Ailne Nettles is a 77 y.o. female who presents today c/o frequent urination, and burning with urination. 1. Have you been to the ER, urgent care clinic since your last visit? Hospitalized since your last visit? NO    2. Have you seen or consulted any other health care providers outside of the [facility] since your last visit? Include any pap smears or colon screening. NO  When :  Reason:    Health Maintenance reviewed Yes    Health Maintenance Due   Topic Date Due    Influenza Age 5 to Adult  08/01/2018    COLONOSCOPY  01/13/2019

## 2018-08-09 LAB
BACTERIA SPEC CULT: NORMAL
SERVICE CMNT-IMP: NORMAL

## 2018-09-10 ENCOUNTER — HOSPITAL ENCOUNTER (OUTPATIENT)
Dept: LAB | Age: 68
Discharge: HOME | End: 2018-09-10
Payer: MEDICARE

## 2018-09-10 ENCOUNTER — OFFICE VISIT (OUTPATIENT)
Dept: FAMILY MEDICINE CLINIC | Age: 68
End: 2018-09-10

## 2018-09-10 VITALS
TEMPERATURE: 98 F | BODY MASS INDEX: 20.55 KG/M2 | OXYGEN SATURATION: 99 % | DIASTOLIC BLOOD PRESSURE: 69 MMHG | SYSTOLIC BLOOD PRESSURE: 152 MMHG | HEART RATE: 85 BPM | RESPIRATION RATE: 19 BRPM | WEIGHT: 120.4 LBS | HEIGHT: 64 IN

## 2018-09-10 DIAGNOSIS — R35.0 URINE FREQUENCY: ICD-10-CM

## 2018-09-10 DIAGNOSIS — R82.90 ABNORMAL URINE ODOR: ICD-10-CM

## 2018-09-10 DIAGNOSIS — R35.0 URINE FREQUENCY: Primary | ICD-10-CM

## 2018-09-10 LAB
BILIRUB UR QL STRIP: NORMAL
GLUCOSE UR-MCNC: NORMAL MG/DL
KETONES P FAST UR STRIP-MCNC: NORMAL MG/DL
PH UR STRIP: 5 [PH] (ref 4.6–8)
PROT UR QL STRIP: NORMAL
SP GR UR STRIP: 1.01 (ref 1–1.03)
UA UROBILINOGEN AMB POC: NORMAL (ref 0.2–1)
URINALYSIS CLARITY POC: CLEAR
URINALYSIS COLOR POC: NORMAL
URINE BLOOD POC: NEGATIVE
URINE LEUKOCYTES POC: NORMAL
URINE NITRITES POC: POSITIVE

## 2018-09-10 PROCEDURE — 87086 URINE CULTURE/COLONY COUNT: CPT | Performed by: FAMILY MEDICINE

## 2018-09-10 RX ORDER — CIPROFLOXACIN 250 MG/1
250 TABLET, FILM COATED ORAL EVERY 12 HOURS
Qty: 20 TAB | Refills: 0 | Status: SHIPPED | OUTPATIENT
Start: 2018-09-10 | End: 2018-09-20

## 2018-09-10 NOTE — PROGRESS NOTES
Room #      SUBJECTIVE:    Viry Alvarado is a 77 y.o. female who presents today for c/o possible UTI    1. Have you been to the ER, urgent care clinic since your last visit? Hospitalized since your last visit? NO    2. Have you seen or consulted any other health care providers outside of the [facility] since your last visit? Include any pap smears or colon screening. NO  When :  Reason:    Health Maintenance reviewed Yes    Health Maintenance Due   Topic Date Due    Influenza Age 5 to Adult  08/01/2018    COLONOSCOPY  01/13/2019

## 2018-09-10 NOTE — PROGRESS NOTES
Demetrius Lara is a 77 y.o.  female and presents with    Chief Complaint   Patient presents with    Bladder Infection           Subjective: Additional Concerns: uti symptoms for 3 daYS          Patient Active Problem List    Diagnosis Date Noted    Primary osteoarthritis involving multiple joints 05/31/2018    Alopecia 01/13/2011    [facility] 01/13/2010     Current Outpatient Prescriptions   Medication Sig Dispense Refill    ciprofloxacin HCl (CIPRO) 250 mg tablet Take 1 Tab by mouth every twelve (12) hours for 10 days. 20 Tab 0    cyclobenzaprine (FLEXERIL) 10 mg tablet Take 10 mg by mouth three (3) times daily as needed for Muscle Spasm(s).  dextran 70-hypromellose (ARTIFICIAL TEARS,[id number],) ophthalmic solution Administer 1 Drop to both eyes as needed.  fexofenadine-pseudoephedrine (ALLEGRA-D 12 HOUR)  mg Tb12 Take 1 Tab by mouth every twelve (12) hours.  diclofenac (VOLTAREN) 1 % gel Apply 2 g to affected area four (4) times daily. For hand pain 100 g 12    polyethylene glycol (MIRALAX) 17 gram packet Take 1 Packet by mouth daily. 100 Each 12     No Known Allergies  Past Medical History:   Diagnosis Date    Alopecia 1/13/2011    Arthritis     Halitosis 1/13/2011    Menopause     Ovarian cancer ([facility]     02/1998    Restless leg syndrome      Past Surgical History:   Procedure Laterality Date    HX CATARACT REMOVAL Right     HX GYN      HX HYSTERECTOMY       Family History   Problem Relation Age of Onset    Heart Disease Mother      Social History   Substance Use Topics    Smoking status: Never Smoker    Smokeless tobacco: Never Used    Alcohol use No       ROS       All other systems reviewed and are negative.       Objective:  Vitals:    09/10/18 1507 09/10/18 1508   BP: 154/69 152/69   Pulse: 84 85   Resp: 19    Temp: 98 °F (36.7 °C)    TempSrc: Oral    SpO2: 99%    Weight: 120 lb 6.4 oz (54.6 kg)    Height: 5' 4\" (1.626 m)    PainSc:   0 - No pain                  alert, well appearing, and in no distress and oriented to person, place, and time  Chest - clear to auscultation, no wheezes, rales or rhonchi, symmetric air entry  Heart - normal rate, regular rhythm, normal S1, S2, no murmurs, rubs, clicks or gallops  Abdomen - soft, nontender, nondistended, no masses or organomegaly        LABS   ua infectged  TESTS      Assessment/Plan:    uti  Cover with cipor      Lab review: labs are reviewed, up to date and normal    Diagnoses and all orders for this visit:    1. Urine frequency  -     AMB POC URINALYSIS DIP STICK AUTO W/O MICRO  -     CULTURE, URINE; Future    2. Abnormal urine odor  -     AMB POC URINALYSIS DIP STICK AUTO W/O MICRO  -     CULTURE, URINE; Future    Other orders  -     ciprofloxacin HCl (CIPRO) 250 mg tablet; Take 1 Tab by mouth every twelve (12) hours for 10 days. I have discussed the diagnosis with the patient and the intended plan as seen in the above orders. The patient has received an after-visit summary and questions were answered concerning future plans. I have discussed medication side effects and warnings with the patient as well. I have reviewed the plan of care with the patient, accepted their input and they are in agreement with the treatment goals. Follow-up Disposition:  Return if symptoms worsen or fail to improve.

## 2018-09-10 NOTE — TELEPHONE ENCOUNTER
Attempted to call patient to schedule a follow up appointment to address frequent bladder infection symptoms. Left call back number.

## 2018-09-10 NOTE — TELEPHONE ENCOUNTER
Patient returned call to schedule a follow up appointment. Patient has been added to todays schedule at 3pm for recurrent possible bladder infection symptoms.

## 2018-09-10 NOTE — MR AVS SNAPSHOT
[address] [id number] [facility] [address] [place] [phone] 
[phone] Patient: Terry Art MRN: [id number] :1950 Visit Information Date & Time Provider Department Dept. Phone Encounter #  
 9/10/2018  3:00 PM Katey Rivera., [address] [facility] [phone] [id number] Follow-up Instructions Return if symptoms worsen or fail to improve. Follow-up and Disposition History Upcoming Health Maintenance Date Due Influenza Age 5 to Adult 2018 COLONOSCOPY 2019 MEDICARE YEARLY EXAM 4/3/2019 BREAST CANCER SCRN MAMMOGRAM 2020 GLAUCOMA SCREENING Q2Y 3/30/2020 DTaP/Tdap/Td series (2 - Td) 2028 Allergies as of 9/10/2018  Review Complete On: 9/10/2018 By: Danette Mehta LPN No Known Allergies Current Immunizations  Reviewed on 10/14/2013 Name Date Influenza High Dose Vaccine PF 2015 Influenza Vaccine 10/27/2014 11:17 AM, 10/14/2013 Influenza Vaccine Split 10/24/2012, 12/10/2010 Influenza Vaccine Whole 2008 Pneumococcal Conjugate (PCV-13) 2015 TD Vaccine 2008 Tdap 2018 ZZZ-RETIRED (DO NOT USE) Pneumococcal Vaccine (Unspecified Type) 2008 Zoster 12/10/2010 Not reviewed this visit You Were Diagnosed With   
  
 Codes Comments Urine frequency    -  Primary ICD-10-CM: R35.0 ICD-9-CM: 788.41 Abnormal urine odor     ICD-10-CM: R82.90 ICD-9-CM: 791.9 Vitals BP Pulse Temp Resp Height(growth percentile) Weight(growth percentile) 152/69 (BP 1 Location: Right arm, BP Patient Position: Sitting) 85 98 °F (36.7 °C) (Oral) 19 5' 4\" (1.626 m) 120 lb 6.4 oz (54.6 kg) SpO2 BMI OB Status Smoking Status 99% 20.67 kg/m2 Hysterectomy Never Smoker Vitals History BMI and BSA Data Body Mass Index Body Surface Area  
 20.67 kg/m 2 1.57 m 2 Preferred Pharmacy Pharmacy Name Phone [facility], [address] [phone] Your Updated Medication List  
  
   
This list is accurate as of 9/10/18  3:25 PM.  Always use your most recent med list. ALLEGRA-D 12 HOUR  mg Tb12 Generic drug:  fexofenadine-pseudoephedrine Take 1 Tab by mouth every twelve (12) hours. ARTIFICIAL TEARS([id number]) ophthalmic solution Generic drug:  dextran 70-hypromellose Administer 1 Drop to both eyes as needed. ciprofloxacin HCl 250 mg tablet Commonly known as:  CIPRO Take 1 Tab by mouth every twelve (12) hours for 10 days. cyclobenzaprine 10 mg tablet Commonly known as:  FLEXERIL Take 10 mg by mouth three (3) times daily as needed for Muscle Spasm(s). diclofenac 1 % Gel Commonly known as:  VOLTAREN Apply 2 g to affected area four (4) times daily. For hand pain  
  
 polyethylene glycol 17 gram packet Commonly known as:  Leata Sans Take 1 Packet by mouth daily. Prescriptions Sent to Pharmacy Refills  
 ciprofloxacin HCl (CIPRO) 250 mg tablet 0 Sig: Take 1 Tab by mouth every twelve (12) hours for 10 days. Class: Normal  
 Pharmacy: [facility] [address] [place] [address], [address] [place] [address] Ph #: [phone] Route: Oral  
  
We Performed the Following AMB POC URINALYSIS DIP STICK AUTO W/O MICRO [53364 CPT(R)] Follow-up Instructions Return if symptoms worsen or fail to improve. To-Do List   
 09/10/2018 Microbiology:  CULTURE, URINE Introducing [facility] & HEALTH SERVICES! Dear Dolores Triplett: Thank you for requesting a [employer] account. Our records indicate that you already have an active [employer] account. You can access your account anytime at [email or web address]. [email or web address] Did you know that you can access your hospital and ER discharge instructions at any time in [employer]? You can also review all of your test results from your hospital stay or ER visit. Additional Information If you have questions, please visit the Frequently Asked Questions section of the [employer] website at [email or web address] Remember, [employer] is NOT to be used for urgent needs. For medical emergencies, dial 911. Now available from your iPhone and Android! Please provide this summary of care documentation to your next provider. Your primary care clinician is listed as [phone] [facility]. If you have any questions after today's visit, please call [phone].

## 2018-09-12 LAB
BACTERIA SPEC CULT: NORMAL
SERVICE CMNT-IMP: NORMAL

## 2018-11-13 ENCOUNTER — DOCUMENTATION ONLY (OUTPATIENT)
Dept: INTERNAL MEDICINE CLINIC | Age: 68
End: 2018-11-13

## 2019-02-28 ENCOUNTER — OFFICE VISIT (OUTPATIENT)
Dept: FAMILY MEDICINE CLINIC | Age: 69
End: 2019-02-28

## 2019-02-28 VITALS
SYSTOLIC BLOOD PRESSURE: 136 MMHG | RESPIRATION RATE: 19 BRPM | HEIGHT: 64 IN | DIASTOLIC BLOOD PRESSURE: 78 MMHG | HEART RATE: 88 BPM | OXYGEN SATURATION: 100 % | WEIGHT: 122 LBS | TEMPERATURE: 98 F | BODY MASS INDEX: 20.83 KG/M2

## 2019-02-28 DIAGNOSIS — M15.9 PRIMARY OSTEOARTHRITIS INVOLVING MULTIPLE JOINTS: ICD-10-CM

## 2019-02-28 DIAGNOSIS — C56.9 MALIGNANT NEOPLASM OF OVARY, UNSPECIFIED LATERALITY (HCC): ICD-10-CM

## 2019-02-28 RX ORDER — OXYCODONE AND ACETAMINOPHEN 5; 325 MG/1; MG/1
1 TABLET ORAL
Qty: 30 TAB | Refills: 0 | Status: SHIPPED | OUTPATIENT
Start: 2019-02-28 | End: 2019-03-07

## 2019-02-28 RX ORDER — CYCLOBENZAPRINE HCL 10 MG
10 TABLET ORAL EVERY 8 HOURS
Qty: 30 TAB | Refills: 1 | Status: SHIPPED | OUTPATIENT
Start: 2019-02-28 | End: 2019-03-07

## 2019-02-28 RX ORDER — DICLOFENAC SODIUM 10 MG/G
2 GEL TOPICAL 4 TIMES DAILY
Qty: 100 G | Refills: 12 | Status: SHIPPED | OUTPATIENT
Start: 2019-02-28 | End: 2019-12-17 | Stop reason: SDUPTHER

## 2019-02-28 RX ORDER — PREDNISONE 10 MG/1
TABLET ORAL
Qty: 30 TAB | Refills: 0 | Status: SHIPPED | OUTPATIENT
Start: 2019-02-28 | End: 2019-03-07

## 2019-02-28 NOTE — PROGRESS NOTES
Room #      SUBJECTIVE:    Asha Arevalo is a 77 y.o. female who presents today for c/o pain to neck, back, shoulder and head X 3 days at a level 8. Patient reports that when she gets up it feels as though her head is about to pop. And when she lie ronan she feel as though she has a magnet in her head that pulls her down. 1. Have you been to the ER, urgent care clinic since your last visit? Hospitalized since your last visit? NO    2. Have you seen or consulted any other health care providers outside of the [address] [place] [address] since your last visit? Include any pap smears or colon screening. NO  When :  Reason:    Health Maintenance reviewed [facility] Maintenance Due   Topic Date Due    Shingrix Vaccine Age 50> (1 of 2) 04/08/1998    Pneumococcal 65+ Low/Medium Risk (2 of 2 - PPSV23) 12/02/2016    Influenza Age 9 to Adult  08/01/2018    COLONOSCOPY  01/13/2019

## 2019-02-28 NOTE — PROGRESS NOTES
Rachell Blevins is a 77 y.o.  female and presents with    Chief Complaint   Patient presents with    Neck Pain           Subjective: Additional Concerns: severe neck pain for several days  flexeirl no help           Patient Active Problem List    Diagnosis Date Noted    Primary osteoarthritis involving multiple joints 05/31/2018    Alopecia 01/13/2011    Ovarian cancer ([facility] 01/13/2010     Current Outpatient Medications   Medication Sig Dispense Refill    diclofenac (VOLTAREN) 1 % gel Apply 2 g to affected area four (4) times daily. For hand pain 100 g 12    cyclobenzaprine (FLEXERIL) 10 mg tablet Take 1 Tab by mouth every eight (8) hours. 30 Tab 1    predniSONE (DELTASONE) 10 mg tablet 4 per day x 3 days, then 3 per day x 3 days, then 2 per day x 3 days, then 1 per day x 3 days, then DC 30 Tab 0    oxyCODONE-acetaminophen (PERCOCET) 5-325 mg per tablet Take 1 Tab by mouth every eight (8) hours as needed for Pain for up to 7 days. Max Daily Amount: 3 Tabs. 30 Tab 0    dextran 70-hypromellose (ARTIFICIAL TEARS,[id number],) ophthalmic solution Administer 1 Drop to both eyes as needed.  fexofenadine-pseudoephedrine (ALLEGRA-D 12 HOUR)  mg Tb12 Take 1 Tab by mouth every twelve (12) hours.  polyethylene glycol (MIRALAX) 17 gram packet Take 1 Packet by mouth daily.  100 Each 12     No Known Allergies  Past Medical History:   Diagnosis Date    Alopecia 1/13/2011    Arthritis     Halitosis 1/13/2011    Menopause     Ovarian cancer ([facility]     02/1998    Restless leg syndrome      Past Surgical History:   Procedure Laterality Date    HX CATARACT REMOVAL Right     HX GYN      HX HYSTERECTOMY       Family History   Problem Relation Age of Onset    Heart Disease Mother      Social History     Tobacco Use    Smoking status: Never Smoker    Smokeless tobacco: Never Used   Substance Use Topics    Alcohol use: No       ROS       All other systems reviewed and are negative. Objective:  Vitals:    02/28/19 1413   BP: 136/78   Pulse: 88   Resp: 19   Temp: 98 °F (36.7 °C)   TempSrc: Oral   SpO2: 100%   Weight: 122 lb (55.3 kg)   Height: 5' 4\" (1.626 m)   PainSc:   8   PainLoc: Head                 alert, well appearing, and in no distress and oriented to person, place, and time  Neck - severe pain and spasms        LABS     TESTS      Assessment/Plan:    Neck pain etio? Will start pred, flex, perc f/u 1 wk      Lab review:     Diagnoses and all orders for this visit:    1. Primary osteoarthritis involving multiple joints  -     diclofenac (VOLTAREN) 1 % gel; Apply 2 g to affected area four (4) times daily. For hand pain  -     cyclobenzaprine (FLEXERIL) 10 mg tablet; Take 1 Tab by mouth every eight (8) hours. -     predniSONE (DELTASONE) 10 mg tablet; 4 per day x 3 days, then 3 per day x 3 days, then 2 per day x 3 days, then 1 per day x 3 days, then DC  -     oxyCODONE-acetaminophen (PERCOCET) 5-325 mg per tablet; Take 1 Tab by mouth every eight (8) hours as needed for Pain for up to 7 days. Max Daily Amount: 3 Tabs. 2. Malignant neoplasm of ovary, unspecified laterality ([facility]          I have discussed the diagnosis with the patient and the intended plan as seen in the above orders. The patient has received an after-visit summary and questions were answered concerning future plans. I have discussed medication side effects and warnings with the patient as well. I have reviewed the plan of care with the patient, accepted their input and they are in agreement with the treatment goals.      Follow-up Disposition: Not on File

## 2019-03-07 ENCOUNTER — OFFICE VISIT (OUTPATIENT)
Dept: FAMILY MEDICINE CLINIC | Age: 69
End: 2019-03-07

## 2019-03-07 VITALS
SYSTOLIC BLOOD PRESSURE: 150 MMHG | DIASTOLIC BLOOD PRESSURE: 78 MMHG | RESPIRATION RATE: 18 BRPM | OXYGEN SATURATION: 98 % | TEMPERATURE: 97.8 F | HEART RATE: 108 BPM

## 2019-03-07 DIAGNOSIS — K59.04 CHRONIC IDIOPATHIC CONSTIPATION: Primary | ICD-10-CM

## 2019-03-07 NOTE — PROGRESS NOTES
Robi Keller is a 77 y.o.  female and presents with    Chief Complaint   Patient presents with    Neck Pain    Constipation           Subjective:  1. Neck is 100% better     2. Chronic ially constipated    Takes occ  Senokot and occ miralax       Additional Concerns:          Patient Active Problem List    Diagnosis Date Noted    Primary osteoarthritis involving multiple joints 05/31/2018    Alopecia 01/13/2011    Ovarian cancer ([facility] 01/13/2010     Current Outpatient Medications   Medication Sig Dispense Refill    diclofenac (VOLTAREN) 1 % gel Apply 2 g to affected area four (4) times daily. For hand pain 100 g 12    dextran 70-hypromellose (ARTIFICIAL TEARS,[id number],) ophthalmic solution Administer 1 Drop to both eyes as needed.  fexofenadine-pseudoephedrine (ALLEGRA-D 12 HOUR)  mg Tb12 Take 1 Tab by mouth every twelve (12) hours.  polyethylene glycol (MIRALAX) 17 gram packet Take 1 Packet by mouth daily. 100 Each 12     No Known Allergies  Past Medical History:   Diagnosis Date    Alopecia 1/13/2011    Arthritis     Halitosis 1/13/2011    Menopause     Ovarian cancer ([facility]     02/1998    Restless leg syndrome      Past Surgical History:   Procedure Laterality Date    HX CATARACT REMOVAL Right     HX GYN      HX HYSTERECTOMY       Family History   Problem Relation Age of Onset    Heart Disease Mother      Social History     Tobacco Use    Smoking status: Never Smoker    Smokeless tobacco: Never Used   Substance Use Topics    Alcohol use: No       ROS       All other systems reviewed and are negative.       Objective:  Vitals:    03/07/19 1445   BP: 150/78   Pulse: (!) 108   Resp: 18   Temp: 97.8 °F (36.6 °C)   TempSrc: Oral   SpO2: 98%                 alert, well appearing, and in no distress and oriented to person, place, and time  Chest - clear to auscultation, no wheezes, rales or rhonchi, symmetric air entry  Heart - normal rate, regular rhythm, normal S1, S2, no murmurs, rubs, clicks or gallops  Abdomen - soft, nontender, nondistended, no masses or organomegaly        LABS     TESTS      Assessment/Plan:    Neck pain resolved    Chronic constipation -- work on diet  Take senokot S and miralax as needed      Lab review: labs are reviewed, up to date and normal    Diagnoses and all orders for this visit:    1. Chronic idiopathic constipation          I have discussed the diagnosis with the patient and the intended plan as seen in the above orders. The patient has received an after-visit summary and questions were answered concerning future plans. I have discussed medication side effects and warnings with the patient as well. I have reviewed the plan of care with the patient, accepted their input and they are in agreement with the treatment goals. Follow-up Disposition:  Return in about 3 months (around 6/7/2019), or if symptoms worsen or fail to improve.

## 2019-03-07 NOTE — PATIENT INSTRUCTIONS
Constipation: Care Instructions  Your Care Instructions    Constipation means that you have a hard time passing stools (bowel movements). People pass stools from 3 times a day to once every 3 days. What is normal for you may be different. Constipation may occur with pain in the rectum and cramping. The pain may get worse when you try to pass stools. Sometimes there are small amounts of bright red blood on toilet paper or the surface of stools. This is because of enlarged veins near the rectum (hemorrhoids). A few changes in your diet and lifestyle may help you avoid ongoing constipation. Your doctor may also prescribe medicine to help loosen your stool. Some medicines can cause constipation. These include pain medicines and antidepressants. Tell your doctor about all the medicines you take. Your doctor may want to make a medicine change to ease your symptoms. Follow-up care is a key part of your treatment and safety. Be sure to make and go to all appointments, and call your doctor if you are having problems. It's also a good idea to know your test results and keep a list of the medicines you take. How can you care for yourself at home? · Drink plenty of fluids, enough so that your urine is light yellow or clear like water. If you have kidney, heart, or liver disease and have to limit fluids, talk with your doctor before you increase the amount of fluids you drink. · Include high-fiber foods in your diet each day. These include fruits, vegetables, beans, and whole grains. · Get at least 30 minutes of exercise on most days of the week. Walking is a good choice. You also may want to do other activities, such as running, swimming, cycling, or playing tennis or team sports. · Take a fiber supplement, such as Citrucel or Metamucil, every day. Read and follow all instructions on the label. · Schedule time each day for a bowel movement. A daily routine may help.  Take your time having your bowel movement. · Support your feet with a small step stool when you sit on the toilet. This helps flex your hips and places your pelvis in a squatting position. · Your doctor may recommend an over-the-counter laxative to relieve your constipation. Examples are Milk of Magnesia and MiraLax. Read and follow all instructions on the label. Do not use laxatives on a long-term basis. When should you call for help? Call your doctor now or seek immediate medical care if:    · You have new or worse belly pain.     · You have new or worse nausea or vomiting.     · You have blood in your stools.    Watch closely for changes in your health, and be sure to contact your doctor if:    · Your constipation is getting worse.     · You do not get better as expected. Where can you learn more? Go to http://good-heidy.info/. Enter [phone] in the search box to learn more about \"Constipation: Care Instructions. \"  Current as of: September 23, 2018  Content Version: 11.9  © 7015-8842 [employer]. Care instructions adapted under license by [employer] (which disclaims liability or warranty for this information). If you have questions about a medical condition or this instruction, always ask your healthcare professional. [place] [address] any warranty or liability for your use of this information.

## 2019-03-07 NOTE — PROGRESS NOTES
Room #      SUBJECTIVE:    Marycarmen Roe is a 77 y.o. female who presents today for follow up for neck pain    1. Have you been to the ER, urgent care clinic since your last visit? Hospitalized since your last visit? NO    2. Have you seen or consulted any other health care providers outside of the [address] [place] [address] since your last visit? Include any pap smears or colon screening. NO  When :  Reason:    Health Maintenance reviewed Yes    Health Maintenance Due   Topic Date Due    Shingrix Vaccine Age 49> (1 of 2) 04/08/1998    Pneumococcal 65+ Low/Medium Risk (2 of 2 - PPSV23) 12/02/2016    Influenza Age 9 to Adult  08/01/2018    COLONOSCOPY  01/13/2019    MEDICARE YEARLY EXAM  04/03/2019

## 2019-12-04 ENCOUNTER — CLINICAL SUPPORT (OUTPATIENT)
Dept: FAMILY MEDICINE CLINIC | Age: 69
End: 2019-12-04

## 2019-12-04 VITALS
HEIGHT: 64 IN | SYSTOLIC BLOOD PRESSURE: 134 MMHG | WEIGHT: 118 LBS | DIASTOLIC BLOOD PRESSURE: 66 MMHG | RESPIRATION RATE: 16 BRPM | OXYGEN SATURATION: 100 % | BODY MASS INDEX: 20.14 KG/M2 | HEART RATE: 80 BPM | TEMPERATURE: 97.9 F

## 2019-12-04 DIAGNOSIS — Z23 ENCOUNTER FOR IMMUNIZATION: Primary | ICD-10-CM

## 2019-12-04 NOTE — PROGRESS NOTES
1. Have you been to the ER, urgent care clinic since your last visit? Hospitalized since your last visit? No    2. Have you seen or consulted any other health care providers outside of the [address] [place] [address] since your last visit? Include any pap smears or colon screening. No     Patient presents in office today for routine care.   Patient concerns: flu shot only

## 2019-12-17 ENCOUNTER — OFFICE VISIT (OUTPATIENT)
Dept: FAMILY MEDICINE CLINIC | Age: 69
End: 2019-12-17

## 2019-12-17 VITALS
HEART RATE: 86 BPM | BODY MASS INDEX: 20.08 KG/M2 | WEIGHT: 117.6 LBS | SYSTOLIC BLOOD PRESSURE: 148 MMHG | OXYGEN SATURATION: 97 % | TEMPERATURE: 97.3 F | HEIGHT: 64 IN | DIASTOLIC BLOOD PRESSURE: 75 MMHG | RESPIRATION RATE: 16 BRPM

## 2019-12-17 DIAGNOSIS — Z00.00 MEDICARE ANNUAL WELLNESS VISIT, SUBSEQUENT: Primary | ICD-10-CM

## 2019-12-17 DIAGNOSIS — S13.9XXA NECK SPRAIN, INITIAL ENCOUNTER: ICD-10-CM

## 2019-12-17 DIAGNOSIS — Z23 ENCOUNTER FOR IMMUNIZATION: ICD-10-CM

## 2019-12-17 DIAGNOSIS — M15.9 PRIMARY OSTEOARTHRITIS INVOLVING MULTIPLE JOINTS: ICD-10-CM

## 2019-12-17 DIAGNOSIS — S46.812A STRAIN OF LEFT TRAPEZIUS MUSCLE, INITIAL ENCOUNTER: ICD-10-CM

## 2019-12-17 RX ORDER — DICLOFENAC SODIUM 10 MG/G
2 GEL TOPICAL 4 TIMES DAILY
Qty: 100 G | Refills: 12 | Status: SHIPPED | OUTPATIENT
Start: 2019-12-17 | End: 2020-04-20 | Stop reason: SDUPTHER

## 2019-12-17 RX ORDER — TIZANIDINE 4 MG/1
4 TABLET ORAL
Qty: 30 TAB | Refills: 1 | Status: SHIPPED | OUTPATIENT
Start: 2019-12-17 | End: 2020-04-20 | Stop reason: SDUPTHER

## 2019-12-17 RX ORDER — PREDNISONE 10 MG/1
TABLET ORAL
Qty: 30 TAB | Refills: 0 | Status: SHIPPED | OUTPATIENT
Start: 2019-12-17 | End: 2020-04-20

## 2019-12-17 NOTE — PROGRESS NOTES
(AWV) The Medicare Annual Wellness Exam PROGRESS NOTE    This is a Medicare Annual Wellness Exam (AWV)     I have reviewed the patient's medical history in detail and updated the computerized patient record. Cyn Mulligan is a 68 y.o.  female and presents for an annual wellness exam     ROS   General ROS: negative for - chills, fatigue or fever  Psychological ROS: negative for - anxiety or depression  Ophthalmic ROS: negative for - blurry vision or uses glasses  ENT ROS: negative for - headaches, nasal congestion or sore throat  Endocrine ROS: negative for - polydipsia/polyuria or temperature intolerance  Respiratory ROS: no cough, shortness of breath, or wheezing  Cardiovascular ROS: no chest pain or dyspnea on exertion  Gastrointestinal ROS: no abdominal pain, change in bowel habits, or black or bloody stools  Genito-Urinary ROS: no dysuria, trouble voiding, or hematuria  Musculoskeletal ROS: positive for - joint pain or muscle pain  Neurological ROS: negative for - numbness/tingling or weakness  Dermatological ROS: negative for - rash or skin lesion changes    All other systems reviewed and are negative. History     Past Medical History:   Diagnosis Date    Alopecia 1/13/2011    Arthritis     Halitosis 1/13/2011    Menopause     [facility]varian cancer ([facility]     02/1998    Restless leg syndrome       Past Surgical History:   Procedure Laterality Date    HX CATARACT REMOVAL Right     HX GYN      HX HYSTERECTOMY       Current Outpatient Medications   Medication Sig Dispense Refill    diclofenac (VOLTAREN) 1 % gel Apply 2 g to affected area four (4) times daily. For hand pain 100 g 12    dextran 70-hypromellose (ARTIFICIAL TEARS,[id number],) ophthalmic solution Administer 1 Drop to both eyes as needed.  fexofenadine-pseudoephedrine (ALLEGRA-D 12 HOUR)  mg Tb12 Take 1 Tab by mouth every twelve (12) hours.  polyethylene glycol (MIRALAX) 17 gram packet Take 1 Packet by mouth daily. 100 Each 12     No Known Allergies  Family History   Problem Relation Age of Onset    Heart Disease Mother      Social History     Tobacco Use    Smoking status: Never Smoker    Smokeless tobacco: Never Used   Substance Use Topics    Alcohol use: No     Patient Active Problem List   Diagnosis Code    [facility] C56.9    Alopecia L65.9    Primary osteoarthritis involving multiple joints M15.0       Health Maintenance History  Immunizations reviewed, dtap up to date , pneumovax due, flu due, zoster up to date  Colonoscopy:up to date,   Eye exam:due  Mammo due  Dexascan up to date        Depression Risk Factor Screening:      Patient Health Questionnaire (PHQ-2)   Over the last 2 weeks, how often have you been bothered by any of the following problems? · Little interest or pleasure in doing things? · Not at all. [0]  · Feeling down, depressed, or hopeless? · Not at all. [0]    Total Score: 0/6  PHQ-2 Assessment Scoring:   A score of 2 or more requires further screening with the PHQ-9    Alcohol Risk Factor Screening:     Women: On any occasion during the past 3 months, have you had more than 3 drinks containing alcohol? no   Do you average more than 7 drinks per week? no    Functional Ability and Level of Safety:     Hearing Loss    Hearing is good. Activities of Daily Living   Self-care. Requires assistance with: no ADLs    Fall Risk   No fall risk factors    Abuse Screen   Patient is not abused    Examination   Physical Examination  Vitals:    12/17/19 1507 12/17/19 1512   BP: 142/73 148/75   Pulse: 86    Resp: 16    Temp: 97.3 °F (36.3 °C)    TempSrc: Oral    SpO2: 97%    Weight: 117 lb 9.6 oz (53.3 kg)    Height: 5' 4\" (1.626 m)    PainSc:   3    PainLoc: Hand       Body mass index is 20.19 kg/m².      Evaluation of Cognitive Function:  Mood/affect:good mood  Appearance:well kempt  Family member/caregiver input: n/a    alert, well appearing, and in no distress, oriented to person, place, and time and normal appearing weight    Patient Care Team:  Rashmi Park., DO as PCP - General  Kim Oliver MD (Ophthalmology)    End-of-life planning  Advanced Directive in the case than an injury or illness causes the patient to be unable to make health care decisions    Health Care Directive or Living Will: yes    Advice/Referrals/Counselling/Plan:   Education and counseling provided:  End-of-Life planning (with patient's consent)  Screening Mammography  Diabetes screening test  Include in education list (weight loss, physical activity, smoking cessation, fall prevention, and nutrition)    ICD-10-CM ICD-9-CM    1. Medicare annual wellness visit, subsequent Z00.00 V70.0    2. Primary osteoarthritis involving multiple joints M15.0 715.09 predniSONE (DELTASONE) 10 mg tablet      tiZANidine (ZANAFLEX) 4 mg tablet      diclofenac (VOLTAREN) 1 % gel   3. Neck sprain, initial encounter S13. 9XXA 847.0 tiZANidine (ZANAFLEX) 4 mg tablet   4. Strain of left trapezius muscle, initial encounter S46.812A 840.8 tiZANidine (ZANAFLEX) 4 mg tablet   5. Encounter for immunization Z23 V03.89 PNEUMOCOCCAL POLYSACCHARIDE VACCINE, 23-VALENT, ADULT OR IMMUNOSUPPRESSED PT DOSE,      ADMIN PNEUMOCOCCAL VACCINE   . Brief written plan, checklist    I have discussed the diagnosis with the patient and the intended plan as seen in the above orders. The patient has received an after-visit summary and questions were answered concerning future plans. I have discussed medication side effects and warnings with the patient as well. I have reviewed the plan of care with the patient, accepted their input and they are in agreement with the treatment goals.                ____________________________________________________________    Problem Assessment    for treatment of   Chief Complaint   Patient presents with    Stiff Neck    Hand Pain    Medication Refill     SUBJECTIVE  Well Adult Physical   Patient here for a comprehensive physical exam.The patient reports problems - neck pain and hand pain  Do you take any herbs or supplements that were not prescribed by a doctor? yes Are you taking calcium supplements? yes Are you taking aspirin daily? not applicable  Carpal Tunnel Syndrome  Patient presents for presents evaluation of pain in hand(s). Onset of the symptoms was several months ago. Current symptoms include tingling involving the distal aspect of the bilateral hand(s): severity: moderate. Patient's overall course: waxing and waning but worse overall. Patient has had no prior elbow problems. Previous visits for this problem: none. Evaluation to date: none. Treatment to date: OTC analgesics: somewhat effective. Neck Pain  Patient complains of neck pain. Onset of symptoms was a few weeks ago, unchanged since that time. Current symptoms are pain in neck (shooting in character; 3/10 in severity). Patient reports numbness, tingling, paresthesias in upper extremities. Patient reports weakness, diminished  strength, lack of coordination. Radiation of pain:  Event that precipitate these symptoms: recurrent use. Patient has had recurrent self limited episodes of neck pain in the past.  Previous treatments include: physical therapy:  Somewhat effective. Visit Vitals  /75 (BP 1 Location: Left arm, BP Patient Position: Sitting)   Pulse 86   Temp 97.3 °F (36.3 °C) (Oral)   Resp 16   Ht 5' 4\" (1.626 m)   Wt 117 lb 9.6 oz (53.3 kg)   SpO2 97%   BMI 20.19 kg/m²     General:  Alert, cooperative, no distress, appears stated age. Head:  Normocephalic, without obvious abnormality, atraumatic. Eyes:  Conjunctivae/corneas clear. PERRL, EOMs intact. Fundi benign. Ears:  Normal TMs and external ear canals both ears. Nose: Nares normal. Septum midline. Mucosa normal. No drainage or sinus tenderness.    Throat: Lips, mucosa, and tongue normal. Teeth and gums normal.   Neck: Supple, symmetrical, trachea midline, no adenopathy, thyroid: no enlargement/tenderness/nodules, no carotid bruit and no JVD. Back:   Cervical ttp; no visual deformities; Symmetric, no curvature. ROM normal. No CVA tenderness. Lungs:   Clear to auscultation bilaterally. Chest wall:  No tenderness or deformity. Heart:  Regular rate and rhythm, S1, S2 normal, no murmur, click, rub or gallop. Breast Exam:  Not examined   Abdomen:   Soft, non-tender. Bowel sounds normal. No masses,  No organomegaly. Genitalia:  Normal female without lesion, discharge or tenderness. Rectal:  Normal tone,  no masses or tenderness  Guaiac negative stool. Extremities: Extremities normal, atraumatic, no cyanosis or edema. Pulses: 2+ and symmetric all extremities. Skin: Skin color, texture, turgor normal. No rashes or lesions. Lymph nodes: Cervical, supraclavicular, and axillary nodes normal.   Neurologic: CNII-XII intact. Normal strength, sensation and reflexes throughout. Assessment/Plan:    1. Medicare annual wellness visit, subsequent  reviewed    2. Primary osteoarthritis involving multiple joints  Start treatment  - predniSONE (DELTASONE) 10 mg tablet; 4 per day x 3 days, then 3 per day x 3 days, then 2 per day x 3 days, then 1 per day x 3 days, then DC  Dispense: 30 Tab; Refill: 0  - tiZANidine (ZANAFLEX) 4 mg tablet; Take 1 Tab by mouth three (3) times daily as needed for Pain. Dispense: 30 Tab; Refill: 1  - diclofenac (VOLTAREN) 1 % gel; Apply 2 g to affected area four (4) times daily. For hand pain  Dispense: 100 g; Refill: 12    3. Neck sprain, initial encounter    - tiZANidine (ZANAFLEX) 4 mg tablet; Take 1 Tab by mouth three (3) times daily as needed for Pain. Dispense: 30 Tab; Refill: 1    4. Strain of left trapezius muscle, initial encounter    - tiZANidine (ZANAFLEX) 4 mg tablet; Take 1 Tab by mouth three (3) times daily as needed for Pain. Dispense: 30 Tab; Refill: 1    5.  Encounter for immunization    - PNEUMOCOCCAL POLYSACCHARIDE VACCINE, 23-VALENT, ADULT OR IMMUNOSUPPRESSED PT DOSE,  - ADMIN PNEUMOCOCCAL VACCINE      Lab review: no lab studies available for review at time of visit

## 2019-12-17 NOTE — PROGRESS NOTES
1. Have you been to the ER, urgent care clinic since your last visit? Hospitalized since your last visit? No    2. Have you seen or consulted any other health care providers outside of the [address] [place] [address] since your last visit? Include any pap smears or colon screening.  No

## 2020-04-20 ENCOUNTER — VIRTUAL VISIT (OUTPATIENT)
Dept: FAMILY MEDICINE CLINIC | Age: 70
End: 2020-04-20

## 2020-04-20 DIAGNOSIS — S13.9XXD NECK SPRAIN, SUBSEQUENT ENCOUNTER: ICD-10-CM

## 2020-04-20 DIAGNOSIS — S46.812D STRAIN OF LEFT TRAPEZIUS MUSCLE, SUBSEQUENT ENCOUNTER: ICD-10-CM

## 2020-04-20 DIAGNOSIS — M54.32 LEFT SIDED SCIATICA: Primary | ICD-10-CM

## 2020-04-20 DIAGNOSIS — M15.9 PRIMARY OSTEOARTHRITIS INVOLVING MULTIPLE JOINTS: ICD-10-CM

## 2020-04-20 RX ORDER — DICLOFENAC SODIUM 10 MG/G
2 GEL TOPICAL 4 TIMES DAILY
Qty: 100 G | Refills: 12 | Status: SHIPPED | OUTPATIENT
Start: 2020-04-20 | End: 2021-11-29 | Stop reason: SDUPTHER

## 2020-04-20 RX ORDER — GABAPENTIN 100 MG/1
100 CAPSULE ORAL 3 TIMES DAILY
Qty: 30 CAP | Refills: 1 | Status: SHIPPED | OUTPATIENT
Start: 2020-04-20 | End: 2020-08-19 | Stop reason: ALTCHOICE

## 2020-04-20 RX ORDER — TIZANIDINE 4 MG/1
4 TABLET ORAL
Qty: 30 TAB | Refills: 1 | Status: SHIPPED | OUTPATIENT
Start: 2020-04-20 | End: 2020-08-11 | Stop reason: ALTCHOICE

## 2020-04-20 NOTE — PROGRESS NOTES
Nichole Bravo is a 65 y.o.  female and presents with    Chief Complaint   Patient presents with    Back Pain     Nichole Bravo is a 65 y.o. female evaluated via doxy. me on 4/20/2020. Consent:  She and/or health care decision maker is aware that that she may receive a bill for this audio/video service, depending on her insurance coverage, and has provided verbal consent to proceed: Yes  Pt is at her home, and I am at [facility]. Stella GOLDSMITH participated in this visit. Paulette Lees LPN assisted with this visit. Documentation:  I communicated with the patient and/or health care decision maker about back pain. Details of this discussion including any medical advice provided: see below      I affirm this is a Patient Initiated Episode with an Established Patient who has not had a related appointment within my department in the past 7 days or scheduled within the next 24 hours. Total Time: minutes: 11-20 minutes    Note: not billable if this call serves to triage the patient into an appointment for the relevant concern      Diego Darby MD         Subjective:  Pt has left foot pain which radiates from the upper left leg which has been present for the past week. she has received cortisone injections in the past for sciatica with good results; her last injection was several years ago. She sleeps with her legs raised on a pillow. She rolls side to side with difficulty sleeping. Her legs go to sleep. She reports that the pain is bilateral.  Intermittently she has knee or ankle edema. It can last 2-3 days then resolves spontaneously. She is exercising with walking; her leg pain is better when she is walking around the yard and working. 6-7/10 is when she needs a shot but right now her pain is 4/10. She is using advil for pain. She is not taking it everyday.       ROS   General ROS: negative for - chills, fatigue or fever  Psychological ROS: negative for - anxiety or depression  Ophthalmic ROS: negative for - blurry vision or uses glasses  ENT ROS: negative for - headaches, nasal congestion or sore throat  Endocrine ROS: negative for - polydipsia/polyuria or temperature intolerance  Respiratory ROS: no cough, shortness of breath, or wheezing  Cardiovascular ROS: no chest pain or dyspnea on exertion  Gastrointestinal ROS: no abdominal pain, change in bowel habits, or black or bloody stools  Genito-Urinary ROS: no dysuria, trouble voiding, or hematuria  Musculoskeletal ROS: positive for - joint pain or muscle pain  Neurological ROS: positive for - numbness/tingling when she lays down; no weakness  Dermatological ROS: negative for - rash or skin lesion changes     All other systems reviewed and are negative.       Objective:  Vitals:    04/20/20 1316   PainSc:   5   PainLoc: Back       alert, well appearing, and in no distress, oriented to person, place, and time and normal appearing weight  Mental status - normal mood, behavior, speech, dress, motor activity, and thought processes  Chest - normal work of breathing  Neurological - cranial nerves II through XII intact    Assessment/Plan:    1. Left sided sciatica  Start gabapentin for nighttime use  - gabapentin (NEURONTIN) 100 mg capsule; Take 1 Cap by mouth three (3) times daily. Max Daily Amount: 300 mg. Dispense: 30 Cap; Refill: 1    2. Primary osteoarthritis involving multiple joints  Continue muscle relaxer  - tiZANidine (ZANAFLEX) 4 mg tablet; Take 1 Tab by mouth three (3) times daily as needed for Pain. Dispense: 30 Tab; Refill: 1  - diclofenac (VOLTAREN) 1 % gel; Apply 2 g to affected area four (4) times daily. For hand pain  Dispense: 100 g; Refill: 12    3. Neck sprain, subsequent encounter  improved  - tiZANidine (ZANAFLEX) 4 mg tablet; Take 1 Tab by mouth three (3) times daily as needed for Pain. Dispense: 30 Tab; Refill: 1    4.  Strain of left trapezius muscle, subsequent encounter  Improved; continue muscle relaxer  - tiZANidine (ZANAFLEX) 4 mg tablet; Take 1 Tab by mouth three (3) times daily as needed for Pain. Dispense: 30 Tab; Refill: 1      Lab review: no lab studies available for review at time of visit      I have discussed the diagnosis with the patient and the intended plan as seen in the above orders. I have discussed medication side effects and warnings with the patient as well. I have reviewed the plan of care with the patient, accepted their input and they are in agreement with the treatment goals.

## 2020-04-20 NOTE — PROGRESS NOTES
Alicia Fairland presents today for   Chief Complaint   Patient presents with    Back Pain       Is someone accompanying this pt? na    Is the patient using any DME equipment during OV? na    Depression Screening:  3 most recent PHQ Screens 12/17/2019   PHQ Not Done -   Little interest or pleasure in doing things Not at all   Feeling down, depressed, irritable, or hopeless Not at all   Total Score PHQ 2 0       Learning Assessment:  Learning Assessment 4/2/2018   PRIMARY LEARNER Patient   PRIMARY LANGUAGE ENGLISH   LEARNER PREFERENCE PRIMARY DEMONSTRATION     -     -   ANSWERED BY patient   RELATIONSHIP SELF       Abuse Screening:  Abuse Screening Questionnaire 4/2/2018   Do you ever feel afraid of your partner? N   Are you in a relationship with someone who physically or mentally threatens you? N   Is it safe for you to go home? Y       Fall Risk  Fall Risk Assessment, last 12 mths 12/17/2019   Able to walk? Yes   Fall in past 12 months? No       Health Maintenance reviewed and discussed and ordered per Provider. Health Maintenance Due   Topic Date Due    Shingrix Vaccine Age 49> (1 of 2) 04/08/1998    Colonoscopy  01/13/2019    Breast Cancer Screen Mammogram  02/08/2019    GLAUCOMA SCREENING Q2Y  03/30/2020   . Coordination of Care:  1. Have you been to the ER, urgent care clinic since your last visit? Hospitalized since your last visit? no    2. Have you seen or consulted any other health care providers outside of the [address] [place] [address] since your last visit? Include any pap smears or colon screening. no      Last  Checked na  Last UDS Checked na  Last Pain contract signed: na    Patient presents in office today for routine care.   Patient concerns: back pain and med refills

## 2020-07-25 ENCOUNTER — HOSPITAL ENCOUNTER (OUTPATIENT)
Age: 70
Setting detail: OBSERVATION
Discharge: HOME | End: 2020-07-27
Attending: EMERGENCY MEDICINE | Admitting: HOSPITALIST
Payer: MEDICARE

## 2020-07-25 ENCOUNTER — APPOINTMENT (OUTPATIENT)
Dept: GENERAL RADIOLOGY | Age: 70
End: 2020-07-25
Attending: EMERGENCY MEDICINE
Payer: MEDICARE

## 2020-07-25 DIAGNOSIS — I47.1 SVT (SUPRAVENTRICULAR TACHYCARDIA) (HCC): Primary | ICD-10-CM

## 2020-07-25 LAB
ALBUMIN SERPL-MCNC: 3.6 G/DL (ref 3.4–5)
ALBUMIN/GLOB SERPL: 1.2 {RATIO} (ref 0.8–1.7)
ALP SERPL-CCNC: 77 U/L (ref 45–117)
ALT SERPL-CCNC: 20 U/L (ref 13–56)
ANION GAP SERPL CALC-SCNC: 7 MMOL/L (ref 3–18)
AST SERPL-CCNC: 23 U/L (ref 10–38)
BASOPHILS # BLD: 0 K/UL (ref 0–0.1)
BASOPHILS NFR BLD: 1 % (ref 0–2)
BILIRUB SERPL-MCNC: 0.5 MG/DL (ref 0.2–1)
BUN SERPL-MCNC: 14 MG/DL (ref 7–18)
BUN/CREAT SERPL: 18 (ref 12–20)
CALCIUM SERPL-MCNC: 8 MG/DL (ref 8.5–10.1)
CHLORIDE SERPL-SCNC: 100 MMOL/L (ref 100–111)
CO2 SERPL-SCNC: 26 MMOL/L (ref 21–32)
CREAT SERPL-MCNC: 0.78 MG/DL (ref 0.6–1.3)
DIFFERENTIAL METHOD BLD: ABNORMAL
EOSINOPHIL # BLD: 0.1 K/UL (ref 0–0.4)
EOSINOPHIL NFR BLD: 2 % (ref 0–5)
ERYTHROCYTE [DISTWIDTH] IN BLOOD BY AUTOMATED COUNT: 12.1 % (ref 11.6–14.5)
GLOBULIN SER CALC-MCNC: 2.9 G/DL (ref 2–4)
GLUCOSE SERPL-MCNC: 129 MG/DL (ref 74–99)
HCT VFR BLD AUTO: 37.5 % (ref 35–45)
HGB BLD-MCNC: 12.9 G/DL (ref 12–16)
LYMPHOCYTES # BLD: 1.8 K/UL (ref 0.9–3.6)
LYMPHOCYTES NFR BLD: 25 % (ref 21–52)
MAGNESIUM SERPL-MCNC: 1.9 MG/DL (ref 1.6–2.6)
MCH RBC QN AUTO: 31.3 PG (ref 24–34)
MCHC RBC AUTO-ENTMCNC: 34.4 G/DL (ref 31–37)
MCV RBC AUTO: 91 FL (ref 74–97)
MONOCYTES # BLD: 0.5 K/UL (ref 0.05–1.2)
MONOCYTES NFR BLD: 7 % (ref 3–10)
NEUTS SEG # BLD: 4.7 K/UL (ref 1.8–8)
NEUTS SEG NFR BLD: 65 % (ref 40–73)
PHOSPHATE SERPL-MCNC: 2.7 MG/DL (ref 2.5–4.9)
PLATELET # BLD AUTO: 245 K/UL (ref 135–420)
PMV BLD AUTO: 10.3 FL (ref 9.2–11.8)
POTASSIUM SERPL-SCNC: 3.8 MMOL/L (ref 3.5–5.5)
PROT SERPL-MCNC: 6.5 G/DL (ref 6.4–8.2)
RBC # BLD AUTO: 4.12 M/UL (ref 4.2–5.3)
SODIUM SERPL-SCNC: 133 MMOL/L (ref 136–145)
TROPONIN I SERPL-MCNC: 0.06 NG/ML (ref 0–0.04)
TROPONIN I SERPL-MCNC: 0.1 NG/ML (ref 0–0.04)
TROPONIN I SERPL-MCNC: <0.02 NG/ML (ref 0–0.04)
TSH SERPL DL<=0.05 MIU/L-ACNC: 4.12 UIU/ML (ref 0.36–3.74)
WBC # BLD AUTO: 7.1 K/UL (ref 4.6–13.2)

## 2020-07-25 PROCEDURE — 71045 X-RAY EXAM CHEST 1 VIEW: CPT

## 2020-07-25 PROCEDURE — 83735 ASSAY OF MAGNESIUM: CPT

## 2020-07-25 PROCEDURE — 99285 EMERGENCY DEPT VISIT HI MDM: CPT

## 2020-07-25 PROCEDURE — 93005 ELECTROCARDIOGRAM TRACING: CPT

## 2020-07-25 PROCEDURE — 99218 HC RM OBSERVATION: CPT

## 2020-07-25 PROCEDURE — 85025 COMPLETE CBC W/AUTO DIFF WBC: CPT

## 2020-07-25 PROCEDURE — 80053 COMPREHEN METABOLIC PANEL: CPT

## 2020-07-25 PROCEDURE — 84443 ASSAY THYROID STIM HORMONE: CPT

## 2020-07-25 PROCEDURE — 74011250636 HC RX REV CODE- 250/636: Performed by: EMERGENCY MEDICINE

## 2020-07-25 PROCEDURE — 84100 ASSAY OF PHOSPHORUS: CPT

## 2020-07-25 PROCEDURE — 84484 ASSAY OF TROPONIN QUANT: CPT

## 2020-07-25 RX ADMIN — SODIUM CHLORIDE 500 ML: 900 INJECTION, SOLUTION INTRAVENOUS at 14:11

## 2020-07-25 NOTE — ED NOTES
IV to LAC infiltrated with IV bolus. IV discontinued cath intact.  New #20 placed to [facility] IVF bolus infusing well

## 2020-07-25 NOTE — ED TRIAGE NOTES
Per medics pt call 911 for high heart rate . Pt found to be in SVT with a rate of 240. 6 mg of adenosine given with 314 ASA.  Pt now NSR

## 2020-07-25 NOTE — ED NOTES
Pt resting comfortable. Voices no c/o pain or discomfort. AAOX4  Skin normal color warm and dry. Resp regular unlabored. Breath sounds clear. Monitor reveals NSR without ectopy.  Abd soft non tender BSx 4

## 2020-07-25 NOTE — ED PROVIDER NOTES
[address] [facility]  EMERGENCY DEPARTMENT HISTORY AND PHYSICAL EXAM       Date: 7/25/2020   Patient Name: Surendra Young   YOB: 1950  Medical Record Number: [id number]    HISTORY OF PRESENTING ILLNESS:     Surendra Young is a 65 y.o. female presenting with the noted PMH to the ED c/o palpitations. Patient states she started noticing her heart beating fast this morning. Going up to 220 bpm at home. She states last time this happened was about a week ago. No increasing or decreasing factors. Per EMS she did break with 6 mg of adenosine in route. She also received 324 mg of aspirin in route. She states she did have a stress test about 4 to 5 years ago when her heart rate this fast.  They sent her heart looked fine then. And discharged her that day. She did not have a heart doctor. Denies any fevers or chills. Denies any nausea or vomiting. She does have allergies so occasionally has nasal congestion. No cough or sore throat. She states she had a little uncomfortable feeling when her heart was beating that fast.  But not really chest pain. Denies any abdominal pain. Denies any urine or bowel changes. She states sometimes she does have constipation. Rest of systems reviewed and negative.     Primary Care Provider: Analia Colvin MD   Specialist:    Past Medical History:   Past Medical History:   Diagnosis Date    Alopecia 1/13/2011    Arthritis     Halitosis 1/13/2011    Menopause     Ovarian cancer ([facility] [address]     02/1998    Restless leg syndrome         Past Surgical History:   Past Surgical History:   Procedure Laterality Date    HX CATARACT REMOVAL Right     HX GYN      HX HYSTERECTOMY          Social History:   Social History     Tobacco Use    Smoking status: Never Smoker    Smokeless tobacco: Never Used   Substance Use Topics    Alcohol use: No    Drug use: No        Allergies:   No Known Allergies     REVIEW OF SYSTEMS:  Review of Systems      PHYSICAL EXAM:  Vitals:    07/25/20 1421 07/25/20 1606 07/25/20 1808   BP: 174/63 134/59 149/69   Pulse: 94 82 73   Resp:  16 19   Temp: 98.2 °F (36.8 °C)  98.3 °F (36.8 °C)   SpO2: 100% 100% 100%   Weight: 48.1 kg (106 lb)     Height: 5' 3\" (1.6 m)         Physical Exam   Vital signs reviewed. Alert oriented x 3 in NAD. HEENT: normocephalic atraumatic. Eyes are PERRLA EOMI. Conjunctiva normal.    External ears and nose normal.    Neck: normal external exam. No midline neck or back TTP. Lungs are clear to ascultation bilaterally. normal effort  Heart is regular rate and rhythm with no murmurs. Abdomen soft and nontender. No rebound rigidity or guarding. Extremities: Moves all 4 extremities and no distress. Full range of motion. 2+ pulses and BCR in all 4 extremities. No edema. No calf tenderness. Neuro: Normal gait. 5 out of 5 strength in all 4 extremities. No facial droop. Skin examination: intact. no rashes. No petechia or purpura.       Medications   sodium chloride 0.9 % bolus infusion 500 mL (0 mL IntraVENous IV Completed 7/25/20 1436)       RESULTS:    Labs -   Labs Reviewed   CBC WITH AUTOMATED DIFF - Abnormal; Notable for the following components:       Result Value    RBC 4.12 (*)     All other components within normal limits   METABOLIC PANEL, COMPREHENSIVE - Abnormal; Notable for the following components:    Sodium 133 (*)     Glucose 129 (*)     Calcium 8.0 (*)     All other components within normal limits   TSH 3RD GENERATION - Abnormal; Notable for the following components:    TSH 4.12 (*)     All other components within normal limits   TROPONIN I - Abnormal; Notable for the following components:    Troponin-I, QT 0.06 (*)     All other components within normal limits   TROPONIN I - Abnormal; Notable for the following components:    Troponin-I, QT 0.10 (*)     All other components within normal limits   TROPONIN I   MAGNESIUM   PHOSPHORUS       Radiologic Studies -  Xr Chest Port    Result Date: 7/25/2020  EXAM: CHEST RADIOGRAPH, SINGLE VIEW CLINICAL INDICATION/HISTORY: svt      <Additional:  None. COMPARISON: 9/15/2007 TECHNIQUE: Portable frontal view of the chest was obtained. _______________ FINDINGS: SUPPORT DEVICES: None. HEART AND MEDIASTINUM: Cardiomediastinal silhouette appears within normal limits. LUNGS AND PLEURAL SPACES: Pulmonary vessels are normal.  Lungs are clear. Costophrenic angles are sharp. No pneumothorax. BONY THORAX AND SOFT TISSUES: No acute osseous abnormality. No acute change. _______________     IMPRESSION: No active cardiopulmonary disease. EKG interpretation:   Done at 1400 read by myself as normal sinus rhythm at 94 bpm.  No ST elevation. Normal axis. EKG done in the field showed rate related ST depressions but now has improved with rate control. EKG done today similar to previous EKG done 11/6/2014. Repeat EKG done at 1633 read by myself as normal sinus rhythm at 73 bpm.  No ST elevation. No longer any ST depression. MEDICAL DECISION MAKING    Patient remains chest pain-free here. No evidence of pneumonia or pneumothorax. No ST elevation. Patient did have ST depressions initially which has now improved. Will admit patient for further evaluation and care. No evidence of sepsis. Abdomen soft and nontender. No signs or symptoms of acute appendicitis cholecystitis or pancreatitis. Will admit patient for further evaluation and care. Patient agrees with plan. Patient remains in normal sinus rhythm at this time. Has not required any re-dosing. Patient continues to be monitored.    1600. Patient reassessed. Feels great. Denies any pain. Agrees to getting second set of troponin. 1818. Paging cardiology. Slight elevation of her troponin.  1900. Spoke with cardiology. Recommending if patient stable can be discharged home and they can do stress test on Monday. If troponin goes up then admit the patient and they will consult. 1915. Spoke with the patient. She agrees with doing a third troponin. She states she still feels good. She states it is hard sitting in this bed for so long because she is usually up and active. Readjusted her bed. She says she feels better. 2020. Spoke with Dr. Kamala King. Will admit patient for further evaluation and care.  2025. Spoke with the patient. She states understanding and agrees with plan for admission for further evaluation and care. Diagnosis   Clinical Impression:   1. SVT (supraventricular tachycardia) (HCC)    elevated troponin  With ST depressions improved    Admitted in stable and improved condition. This chart was completed using Dragon, a dictation transcription service. Errors may have resulted from using this device.

## 2020-07-25 NOTE — ED NOTES
Pt resting quietly Voices no c/o pian or discomfort. Skin normal color warm and dry Resp regular unlabored.   Monitor reveals NSR without ectopy

## 2020-07-26 ENCOUNTER — APPOINTMENT (OUTPATIENT)
Dept: NON INVASIVE DIAGNOSTICS | Age: 70
End: 2020-07-26
Attending: HOSPITALIST
Payer: MEDICARE

## 2020-07-26 LAB
ALBUMIN SERPL-MCNC: 3.5 G/DL (ref 3.4–5)
ALBUMIN/GLOB SERPL: 1.2 {RATIO} (ref 0.8–1.7)
ALP SERPL-CCNC: 72 U/L (ref 45–117)
ALT SERPL-CCNC: 19 U/L (ref 13–56)
ANION GAP SERPL CALC-SCNC: 5 MMOL/L (ref 3–18)
APTT PPP: 32.1 SEC (ref 23–36.4)
AST SERPL-CCNC: 15 U/L (ref 10–38)
ATRIAL RATE: 94 BPM
BILIRUB SERPL-MCNC: 0.6 MG/DL (ref 0.2–1)
BUN SERPL-MCNC: 11 MG/DL (ref 7–18)
BUN/CREAT SERPL: 18 (ref 12–20)
CALCIUM SERPL-MCNC: 8.3 MG/DL (ref 8.5–10.1)
CALCULATED P AXIS, ECG09: 74 DEGREES
CALCULATED R AXIS, ECG10: 26 DEGREES
CALCULATED T AXIS, ECG11: 68 DEGREES
CHLORIDE SERPL-SCNC: 105 MMOL/L (ref 100–111)
CHOLEST SERPL-MCNC: 197 MG/DL
CO2 SERPL-SCNC: 27 MMOL/L (ref 21–32)
CREAT SERPL-MCNC: 0.62 MG/DL (ref 0.6–1.3)
DIAGNOSIS, 93000: NORMAL
ECHO AO ROOT DIAM: 3.11 CM
ECHO LA MAJOR AXIS: 3.39 CM
ECHO LA MINOR AXIS: 2.3 CM
ECHO LV E' LATERAL VELOCITY: 3.8 CM/S
ECHO LV E' SEPTAL VELOCITY: 4.87 CM/S
ECHO LV INTERNAL DIMENSION DIASTOLIC: 4.42 CM (ref 3.9–5.3)
ECHO LV INTERNAL DIMENSION SYSTOLIC: 3.25 CM
ECHO LV IVSD: 0.82 CM (ref 0.6–0.9)
ECHO LV MASS 2D: 117.6 G (ref 67–162)
ECHO LV MASS INDEX 2D: 79.7 G/M2 (ref 43–95)
ECHO LV POSTERIOR WALL DIASTOLIC: 0.86 CM (ref 0.6–0.9)
ECHO LVOT DIAM: 2.02 CM
ECHO MV A VELOCITY: 67.48 CM/S
ECHO MV E DECELERATION TIME (DT): 0.2 S
ECHO MV E VELOCITY: 78.17 CM/S
ECHO MV E/A RATIO: 1.16
ECHO MV E/E' LATERAL: 20.57
ECHO MV E/E' RATIO (AVERAGED): 18.31
ECHO MV E/E' SEPTAL: 16.05
ECHO PVEIN A DURATION: 0.14 S
ECHO PVEIN A VELOCITY: 28.86 CM/S
ECHO PVEIN PEAK D VELOCITY: 39.37 CM/S
ECHO PVEIN PEAK S VELOCITY: 67.67 CM/S
ECHO PVEIN S/D RATIO: 1.72
ECHO TV REGURGITANT MAX VELOCITY: 203 CM/S
ECHO TV REGURGITANT PEAK GRADIENT: 16.53 MMHG
ERYTHROCYTE [DISTWIDTH] IN BLOOD BY AUTOMATED COUNT: 12.4 % (ref 11.6–14.5)
GLOBULIN SER CALC-MCNC: 2.9 G/DL (ref 2–4)
GLUCOSE SERPL-MCNC: 80 MG/DL (ref 74–99)
HCT VFR BLD AUTO: 38.9 % (ref 35–45)
HDLC SERPL-MCNC: 84 MG/DL (ref 40–60)
HDLC SERPL: 2.3 {RATIO} (ref 0–5)
HGB BLD-MCNC: 13.1 G/DL (ref 12–16)
LDLC SERPL CALC-MCNC: 101 MG/DL (ref 0–100)
LIPID PROFILE,FLP: ABNORMAL
MCH RBC QN AUTO: 30.8 PG (ref 24–34)
MCHC RBC AUTO-ENTMCNC: 33.7 G/DL (ref 31–37)
MCV RBC AUTO: 91.5 FL (ref 74–97)
P-R INTERVAL, ECG05: 118 MS
PLATELET # BLD AUTO: 250 K/UL (ref 135–420)
PMV BLD AUTO: 10.6 FL (ref 9.2–11.8)
POTASSIUM SERPL-SCNC: 3.8 MMOL/L (ref 3.5–5.5)
PROT SERPL-MCNC: 6.4 G/DL (ref 6.4–8.2)
Q-T INTERVAL, ECG07: 362 MS
QRS DURATION, ECG06: 98 MS
QTC CALCULATION (BEZET), ECG08: 452 MS
RBC # BLD AUTO: 4.25 M/UL (ref 4.2–5.3)
SODIUM SERPL-SCNC: 137 MMOL/L (ref 136–145)
TRIGL SERPL-MCNC: 60 MG/DL (ref ?–150)
TROPONIN I SERPL-MCNC: 0.06 NG/ML (ref 0–0.04)
VENTRICULAR RATE, ECG03: 94 BPM
VLDLC SERPL CALC-MCNC: 12 MG/DL
WBC # BLD AUTO: 6.9 K/UL (ref 4.6–13.2)

## 2020-07-26 PROCEDURE — 85027 COMPLETE CBC AUTOMATED: CPT

## 2020-07-26 PROCEDURE — 93306 TTE W/DOPPLER COMPLETE: CPT

## 2020-07-26 PROCEDURE — 77030021352 HC CBL LD SYS DISP COVD -B

## 2020-07-26 PROCEDURE — 74011250636 HC RX REV CODE- 250/636: Performed by: HOSPITALIST

## 2020-07-26 PROCEDURE — 99218 HC RM OBSERVATION: CPT

## 2020-07-26 PROCEDURE — 80053 COMPREHEN METABOLIC PANEL: CPT

## 2020-07-26 PROCEDURE — 84484 ASSAY OF TROPONIN QUANT: CPT

## 2020-07-26 PROCEDURE — 74011250637 HC RX REV CODE- 250/637: Performed by: HOSPITALIST

## 2020-07-26 PROCEDURE — 85730 THROMBOPLASTIN TIME PARTIAL: CPT

## 2020-07-26 PROCEDURE — 74011250637 HC RX REV CODE- 250/637: Performed by: INTERNAL MEDICINE

## 2020-07-26 PROCEDURE — 36415 COLL VENOUS BLD VENIPUNCTURE: CPT

## 2020-07-26 PROCEDURE — 80061 LIPID PANEL: CPT

## 2020-07-26 PROCEDURE — 96372 THER/PROPH/DIAG INJ SC/IM: CPT

## 2020-07-26 RX ORDER — METOPROLOL TARTRATE 25 MG/1
12.5 TABLET, FILM COATED ORAL 2 TIMES DAILY
Status: DISCONTINUED | OUTPATIENT
Start: 2020-07-26 | End: 2020-07-26

## 2020-07-26 RX ORDER — ACETAMINOPHEN 325 MG/1
650 TABLET ORAL
Status: DISCONTINUED | OUTPATIENT
Start: 2020-07-26 | End: 2020-07-27 | Stop reason: HOSPADM

## 2020-07-26 RX ORDER — ONDANSETRON 2 MG/ML
4 INJECTION INTRAMUSCULAR; INTRAVENOUS
Status: DISCONTINUED | OUTPATIENT
Start: 2020-07-26 | End: 2020-07-27 | Stop reason: HOSPADM

## 2020-07-26 RX ORDER — HEPARIN SODIUM 5000 [USP'U]/ML
5000 INJECTION, SOLUTION INTRAVENOUS; SUBCUTANEOUS EVERY 8 HOURS
Status: DISCONTINUED | OUTPATIENT
Start: 2020-07-26 | End: 2020-07-27 | Stop reason: HOSPADM

## 2020-07-26 RX ORDER — METOPROLOL TARTRATE 25 MG/1
12.5 TABLET, FILM COATED ORAL 2 TIMES DAILY
Qty: 60 TAB | Refills: 0 | Status: SHIPPED | OUTPATIENT
Start: 2020-07-26 | End: 2020-07-27

## 2020-07-26 RX ORDER — ASPIRIN 81 MG/1
81 TABLET ORAL DAILY
Status: DISCONTINUED | OUTPATIENT
Start: 2020-07-26 | End: 2020-07-27 | Stop reason: HOSPADM

## 2020-07-26 RX ORDER — SODIUM CHLORIDE 9 MG/ML
75 INJECTION, SOLUTION INTRAVENOUS CONTINUOUS
Status: DISCONTINUED | OUTPATIENT
Start: 2020-07-26 | End: 2020-07-27 | Stop reason: HOSPADM

## 2020-07-26 RX ORDER — METOPROLOL SUCCINATE 25 MG/1
25 TABLET, EXTENDED RELEASE ORAL DAILY
Status: DISCONTINUED | OUTPATIENT
Start: 2020-07-26 | End: 2020-07-27 | Stop reason: HOSPADM

## 2020-07-26 RX ADMIN — HEPARIN SODIUM 5000 UNITS: 5000 INJECTION INTRAVENOUS; SUBCUTANEOUS at 03:00

## 2020-07-26 RX ADMIN — METOPROLOL TARTRATE 12.5 MG: 25 TABLET, FILM COATED ORAL at 08:19

## 2020-07-26 RX ADMIN — METOPROLOL SUCCINATE 25 MG: 25 TABLET, EXTENDED RELEASE ORAL at 17:01

## 2020-07-26 RX ADMIN — ASPIRIN 81 MG: 81 TABLET, COATED ORAL at 09:00

## 2020-07-26 RX ADMIN — HEPARIN SODIUM 5000 UNITS: 5000 INJECTION INTRAVENOUS; SUBCUTANEOUS at 17:06

## 2020-07-26 RX ADMIN — SODIUM CHLORIDE 75 ML/HR: 900 INJECTION, SOLUTION INTRAVENOUS at 17:06

## 2020-07-26 RX ADMIN — SODIUM CHLORIDE 75 ML/HR: 900 INJECTION, SOLUTION INTRAVENOUS at 01:00

## 2020-07-26 NOTE — CONSULTS
[facility] - Consult Note    Consultation request by Gideon Lopez MD for advice/opinion related to evaluating SVT (supraventricular tachycardia) ([facility] [I47.1]    Date of  Admission: 7/25/2020  2:06 PM   Primary Care Physician:  Petey Edmonds MD     Assessment:     -SVT/narrow complex tachycardia at almost 200 bpm.  Responsive to adenosine  -History of ovarian cancer  -Osteoarthritis     Plan:     Patient came to hospital with palpitation and chest pressure. EKG confirmed narrow complex tachycardia with very elevated heart rate and diffuse ST depression and subendocardial ischemia  MI has been ruled out  Patient had another episode of chest pressure and also palpitation before    -Echo images reviewed personally. Full report to follow. -Nuclear stress test tomorrow because of abnormal EKG and chest pressure  -Start Toprol-XL 25 mg daily. Potential role of cardiac ablation discussed with the patient in future if not responsive to Toprol     History of Present Illness: This is a 65 y.o. female admitted for SVT (supraventricular tachycardia) ([facility] [I47.1]. Patient complains of:      Ms. Loreta Woodard is 26-year-old female with no significant past medical history   She came to hospital after having a palpitation and rapid heartbeat which caused chest pressure as well. This lasted for about less than 30 minutes. She called EMT. She had EKG which showed narrow complex tachycardia and adenosine was given   She had similar episode of chest pressure and palpitation about a week ago when she thought her heart rate was 200 bpm based on pulse monitoring she has at home.     She is asymptomatic    cardiac risk factors: none      Review of Symptoms:  Except as stated above include:  Constitutional:  negative  Respiratory:  negative  Cardiovascular:  negative  Gastrointestinal: negative  Genitourinary:  negative  Musculoskeletal:  Negative  Neurological:  Negative  Dermatological: Negative  Endocrinological: Negative  Psychological:  Negative    A comprehensive review of systems was negative except for that written in the HPI.      Past Medical History:     Past Medical History:   Diagnosis Date    Alopecia 1/13/2011    Arthritis     Halitosis 1/13/2011    Menopause     Ovarian cancer ([facility]     02/1998    Restless leg syndrome          Social History:     Social History     Socioeconomic History    Marital status:      Spouse name: Not on file    Number of children: Not on file    Years of education: Not on file    Highest education level: Not on file   Occupational History    Occupation: [employer]   Tobacco Use    Smoking status: Never Smoker    Smokeless tobacco: Never Used   Substance and Sexual Activity    Alcohol use: No    Drug use: No    Sexual activity: Yes     Partners: Male        Family History:     Family History   Problem Relation Age of Onset    Heart Disease Mother         Medications:   No Known Allergies     Current Facility-Administered Medications   Medication Dose Route Frequency    acetaminophen (TYLENOL) tablet 650 mg  650 mg Oral Q6H PRN    ondansetron (ZOFRAN) injection 4 mg  4 mg IntraVENous Q6H PRN    0.9% sodium chloride infusion  75 mL/hr IntraVENous CONTINUOUS    heparin (porcine) injection 5,000 Units  5,000 Units SubCUTAneous Q8H    aspirin delayed-release tablet 81 mg  81 mg Oral DAILY    metoprolol succinate (TOPROL-XL) XL tablet 25 mg  25 mg Oral DAILY         Physical Exam:     Visit Vitals  /68   Pulse 73   Temp 97.9 °F (36.6 °C)   Resp 18   Ht 5' 3\" (1.6 m)   Wt 106 lb (48.1 kg)   SpO2 99%   Breastfeeding No   BMI 18.78 kg/m²     BP Readings from Last 3 Encounters:   07/26/20 152/68   12/17/19 148/75   12/04/19 134/66     Pulse Readings from Last 3 Encounters:   07/26/20 73   12/17/19 86   12/04/19 80     Wt Readings from Last 3 Encounters:   07/26/20 106 lb (48.1 kg)   12/17/19 117 lb 9.6 oz (53.3 kg)   12/04/19 118 lb (53.5 kg)       General:  alert, cooperative, no distress, appears stated age  Neck:  no carotid bruit, no JVD  Lungs:  clear to auscultation bilaterally  Heart:  regular rate and rhythm, S1, S2 normal, no murmur, click, rub or gallop  Abdomen:  abdomen is soft without significant tenderness, masses, organomegaly or guarding  Extremities:  extremities normal, atraumatic, no cyanosis or edema  Skin: Warm and dry.  no hyperpigmentation, vitiligo, or suspicious lesions  Neuro: alert, oriented x3, affect appropriate, no focal neurological deficits, moves all extremities well,  Psych: non focal     Data Review:     Recent Labs     07/26/20  0215 07/25/20  1410   WBC 6.9 7.1   HGB 13.1 12.9   HCT 38.9 37.5    245     Recent Labs     07/26/20  0215 07/25/20  1410    133*   K 3.8 3.8    100   CO2 27 26   GLU 80 129*   BUN 11 14   CREA 0.62 0.78   CA 8.3* 8.0*   MG  --  1.9   PHOS  --  2.7   ALB 3.5 3.6   ALT 19 20       Results for orders placed or performed during the hospital encounter of 07/25/20   EKG, 12 LEAD, INITIAL   Result Value Ref Range    Ventricular Rate 94 BPM    Atrial Rate 94 BPM    P-R Interval 118 ms    QRS Duration 98 ms    Q-T Interval 362 ms    QTC Calculation (Bezet) 452 ms    Calculated P Axis 74 degrees    Calculated R Axis 26 degrees    Calculated T Axis 68 degrees    Diagnosis       Normal sinus rhythm  Possible Left atrial enlargement  Incomplete right bundle branch block  Nonspecific ST abnormality  Abnormal ECG  No previous ECGs available     Results for orders placed or performed in visit on 10/24/12   AMB POC EKG ROUTINE W/ 12 LEADS, INTER & REP    Narrative    NSR         All Cardiac Markers in the last 24 hours:    Lab Results   Component Value Date/Time    TROIQ 0.06 (H) 07/26/2020 02:15 AM    TROIQ 0.10 (H) 07/25/2020 07:30 PM    TROIQ 0.06 (H) 07/25/2020 05:30 PM    TROIQ <0.02 07/25/2020 02:10 PM       Last Lipid:    Lab Results   Component Value Date/Time Cholesterol, total 197 07/26/2020 02:15 AM    HDL Cholesterol 84 (H) 07/26/2020 02:15 AM    LDL, calculated 101 (H) 07/26/2020 02:15 AM    Triglyceride 60 07/26/2020 02:15 AM    CHOL/HDL Ratio 2.3 07/26/2020 02:15 AM     Total critical care time spent in reviewing the case, multiple EMR databases, physician notes, procedure notes, examining the patient, and documentation, is >60 minutes. EKG, ECHO images labs reviewed personally  Discussed in details with patient at bedside. All questions were answered to their full satisfaction. Risk, benefit and alternatives were discussed. >50% time spent in counseling and coordination of care.     Signed By: Yolanda Cervantes MD     July 26, 2020

## 2020-07-26 NOTE — ED NOTES
Patient given a warm meal per request as allowed by provider. Patient eating without difficulty or assistance.

## 2020-07-26 NOTE — ROUTINE PROCESS
Bedside and Verbal shift change report given to [address] [place] [address]  (oncoming nurse) by Joe Pinto (offgoing nurse). Report included the following information SBAR, Kardex and MAR.

## 2020-07-26 NOTE — PROGRESS NOTES
Problem: Falls - Risk of  Goal: *Absence of Falls  Description: Document Ld [place] Fall Risk and appropriate interventions in the flowsheet.   Outcome: Resolved/Met     Problem: Patient Education: Go to Patient Education Activity  Goal: Patient/Family Education  Outcome: Resolved/Met

## 2020-07-26 NOTE — ROUTINE PROCESS
0948 - assumed care of patient - alert and oriented - denies any pain - SR with no c/o palpations. Ambulatory to BR  
 
0930 - patient with orders to d/c home 1215 -  Ambulatory in room - to BR - sitting up for lunch. Reviewed orders for stress test in the morning and NPO after MN.

## 2020-07-26 NOTE — PROGRESS NOTES
Problem: Discharge Planning  Goal: *Discharge to safe environment  Outcome: Resolved/Met   Plan home    Pt dc'd home no services ordered. Reason for Admission:   svt                   RUR Score:     n/a                Plan for utilizing home health:  no        PCP: First and Last name:  aashish butler   Name of Practice:    Are you a current patient: Yes/No: yes   Approximate date of last visit: 2mo ago   Can you participate in a virtual visit with your PCP: no                    Current Advanced Directive/Advance Care Plan: yes on file names spouse thelma as primary agent                         Transition of Care Plan:   Spoke with pt,lives with spouse. States she is independent with adls and amb. Has a cane, walker,she does not use either. Designates spouse for dcp,transport home. Demographics correct      Patient has designated __________spouse______________ to participate in his/her discharge plan and to receive any needed information. Name: Jeanna Page  Address:  Phone number: [phone]     Patient and/or next of kin has been given and has signed the [facility] Outpatient Observation  Notification letter and all questions answered. Copy of this notice given to patient and copy placed on chart. Patient and/or next of kin has been given the Outpatient Observation Information and Notification letter and all questions answered. Care Management Interventions  PCP Verified by CM: Yes  Palliative Care Criteria Met (RRAT>21 & CHF Dx)?: No  Mode of Transport at Discharge:  Other (see comment)  Discharge Durable Medical Equipment: No  Physical Therapy Consult: No  Occupational Therapy Consult: No  Speech Therapy Consult: No  Current Support Network: Lives with Spouse  Confirm Follow Up Transport: Family  The Patient and/or Patient Representative was Provided with a Choice of Provider and Agrees with the Discharge Plan?: No  Freedom of Choice List was Provided with Basic Dialogue that Supports the Patient's Individualized Plan of Care/Goals, Treatment Preferences and Shares the Quality Data Associated with the Providers?: No  Discharge Location  Discharge Placement: Home

## 2020-07-26 NOTE — PROGRESS NOTES
TRANSFER - IN REPORT:    Verbal report received from [facility]  being received from ED(unit) for routine progression of care      Report consisted of patients Situation, Background, Assessment and   Recommendations(SBAR). Information from the following report(s) SBAR, Kardex, Intake/Output, MAR and Recent Results was reviewed with the receiving nurse. Opportunity for questions and clarification was provided. Assessment completed upon patients arrival to unit and care assumed. Dual skin assessment with Sister Dany with skin intact. No bruises, laceration or abrasion noted. 0100 Ambulatory to bathroom with minimum assistance, steady gait noted. 0230 No complaints voiced. 0430 Patient complain of  IV site right ac painful when bending       0730 Bedside and Verbal shift change report given to Gabe Engel RN (oncoming nurse) by Aurelio Dawkins RN (offgoing nurse). Report given with SBAR, Kardex, Intake/Output, MAR and Recent Results.

## 2020-07-26 NOTE — H&P
2 [facility]  Hospitalist Division      History & Physical    Patient: Charity Schultz MRN: [id number]  [facility]: [id number]    YOB: 1950  Age: 65 y.o. Sex: female    DOA: 7/25/2020 LOS:  LOS: 0 days        DOA: 7/25/2020        Assessment/Plan     Active Problems:    SVT (supraventricular tachycardia) ([facility] [address] (7/25/2020)        Plan:  1. SVT resolved with adenosine 6 mg x 1, patient currently in sinus rhythm and in no acute distress. Cardiac enzymestroponin with slight elevation likely secondary to SVT, cardiology consulted by ER and advised patient could be discharged home if troponin remains stable. Third troponin came back slightly higher than the second and patient is being admitted on observation status. We will start patient on low-dose beta-blocker and order echocardiogram.  2. History of osteoarthritis  DVT prophylaxisHeparin  Full code              HPI:     Charity Schultz is a 65 y.o. female who is being admitted to the hospital secondary to SVT. Patient mentions that she was out working in her yard when she felt her heart racing. She mentions that it was a hot day and she did not drink enough water. She checked her pulse at home and it was in the 200s and decided to come to the emergency room for further evaluation. Patient mentions that this is happened to her in the past also with the most recent event being 2 weeks ago however it resolved by itself. ER evaluation patient noted to be in SVT, given adenosine 6 mg with resolution of heart rate. She is currently in sinus rhythm. Cardiology consulted by ER and initial plan was to discharge patient home however patient noted to have a mild elevation in troponin and is being admitted on observation status. Patient is currently symptom-free and in no acute distress.     Past Medical History:   Diagnosis Date    Alopecia 1/13/2011    Arthritis     Halitosis 1/13/2011    Menopause     Ovarian cancer ([facility] [address] 02/1998    Restless leg syndrome        Past Surgical History:   Procedure Laterality Date    HX CATARACT REMOVAL Right     HX GYN      HX HYSTERECTOMY         Family History   Problem Relation Age of Onset    Heart Disease Mother        Social History     Socioeconomic History    Marital status:      Spouse name: Not on file    Number of children: Not on file    Years of education: Not on file    Highest education level: Not on file   Occupational History    Occupation:    Tobacco Use    Smoking status: Never Smoker    Smokeless tobacco: Never Used   Substance and Sexual Activity    Alcohol use: No    Drug use: No    Sexual activity: Yes     Partners: Male       Prior to Admission medications    Medication Sig Start Date End Date Taking? Authorizing Provider   gabapentin (NEURONTIN) 100 mg capsule Take 1 Cap by mouth three (3) times daily. Max Daily Amount: 300 mg. 4/20/20  Yes Martha Kirby MD   tiZANidine (ZANAFLEX) 4 mg tablet Take 1 Tab by mouth three (3) times daily as needed for Pain. 4/20/20  Yes Martha Kirby MD   diclofenac (VOLTAREN) 1 % gel Apply 2 g to affected area four (4) times daily. For hand pain 4/20/20  Yes Aimee Rico MD   dextran 70-hypromellose (ARTIFICIAL TEARS,[id number],) ophthalmic solution Administer 1 Drop to both eyes as needed. Yes Provider, Historical   fexofenadine-pseudoephedrine (ALLEGRA-D 12 HOUR)  mg Tb12 Take 1 Tab by mouth every twelve (12) hours. Yes Provider, Historical   polyethylene glycol (MIRALAX) 17 gram packet Take 1 Packet by mouth daily. 5/31/18  Yes Emily Brennan., DO       No Known Allergies    Review of Systems  A comprehensive review of systems was negative except for that written in the History of Present Illness.              Physical Exam:      Visit Vitals  /49   Pulse 71   Temp 98.3 °F (36.8 °C)   Resp 17   Ht 5' 3\" (1.6 m)   Wt 48.1 kg (106 lb)   SpO2 99%   BMI 18.78 kg/m²       Physical Exam:    Gen: In general, this is a well nourished female in no acute distress  HEENT: Sclerae nonicteric. Oral mucous membranes moist. Dentition normal  Neck: Supple with midline trachea. CV: RRR without murmur or rub appreciated. Resp:Respirations are unlabored without use of accessory muscles. Lung fields B/L without wheezes or rhonchi. Abd: Soft, nontender, nondistended. Extrem: Extremities are warm, without cyanosis or clubbing. No pitting pretibial edema  Skin: Warm, no visible rashes. Neuro: Patient is alert, oriented, and cooperative. No obvious focal defects. Moves all 4 extremities. Labs Reviewed:    Recent Results (from the past 24 hour(s))   EKG, 12 LEAD, INITIAL    Collection Time: 07/25/20  2:00 PM   Result Value Ref Range    Ventricular Rate 94 BPM    Atrial Rate 94 BPM    P-R Interval 118 ms    QRS Duration 98 ms    Q-T Interval 362 ms    QTC Calculation (Bezet) 452 ms    Calculated P Axis 74 degrees    Calculated R Axis 26 degrees    Calculated T Axis 68 degrees    Diagnosis       Normal sinus rhythm  Possible Left atrial enlargement  Incomplete right bundle branch block  Nonspecific ST abnormality  Abnormal ECG  No previous ECGs available     CBC WITH AUTOMATED DIFF    Collection Time: 07/25/20  2:10 PM   Result Value Ref Range    WBC 7.1 4.6 - 13.2 K/uL    RBC 4.12 (L) 4.20 - 5.30 M/uL    HGB 12.9 12.0 - 16.0 g/dL    HCT 37.5 35.0 - 45.0 %    MCV 91.0 74.0 - 97.0 FL    MCH 31.3 24.0 - 34.0 PG    MCHC 34.4 31.0 - 37.0 g/dL    RDW 12.1 11.6 - 14.5 %    PLATELET 373 125 - 809 K/uL    MPV 10.3 9.2 - 11.8 FL    NEUTROPHILS 65 40 - 73 %    LYMPHOCYTES 25 21 - 52 %    MONOCYTES 7 3 - 10 %    EOSINOPHILS 2 0 - 5 %    BASOPHILS 1 0 - 2 %    ABS. NEUTROPHILS 4.7 1.8 - 8.0 K/UL    ABS. LYMPHOCYTES 1.8 0.9 - 3.6 K/UL    ABS. MONOCYTES 0.5 0.05 - 1.2 K/UL    ABS. EOSINOPHILS 0.1 0.0 - 0.4 K/UL    ABS.  BASOPHILS 0.0 0.0 - 0.1 K/UL    DF AUTOMATED     METABOLIC PANEL, COMPREHENSIVE    Collection Time: 07/25/20  2:10 PM   Result Value Ref Range    Sodium 133 (L) 136 - 145 mmol/L    Potassium 3.8 3.5 - 5.5 mmol/L    Chloride 100 100 - 111 mmol/L    CO2 26 21 - 32 mmol/L    Anion gap 7 3.0 - 18 mmol/L    Glucose 129 (H) 74 - 99 mg/dL    BUN 14 7.0 - 18 MG/DL    Creatinine 0.78 0.6 - 1.3 MG/DL    BUN/Creatinine ratio 18 12 - 20      GFR est AA >60 >60 ml/min/1.73m2    GFR est non-AA >60 >60 ml/min/1.73m2    Calcium 8.0 (L) 8.5 - 10.1 MG/DL    Bilirubin, total 0.5 0.2 - 1.0 MG/DL    ALT (SGPT) 20 13 - 56 U/L    AST (SGOT) 23 10 - 38 U/L    Alk.  phosphatase 77 45 - 117 U/L    Protein, total 6.5 6.4 - 8.2 g/dL    Albumin 3.6 3.4 - 5.0 g/dL    Globulin 2.9 2.0 - 4.0 g/dL    A-G Ratio 1.2 0.8 - 1.7     TROPONIN I    Collection Time: 07/25/20  2:10 PM   Result Value Ref Range    Troponin-I, QT <0.02 0.0 - 0.045 NG/ML   MAGNESIUM    Collection Time: 07/25/20  2:10 PM   Result Value Ref Range    Magnesium 1.9 1.6 - 2.6 mg/dL   PHOSPHORUS    Collection Time: 07/25/20  2:10 PM   Result Value Ref Range    Phosphorus 2.7 2.5 - 4.9 MG/DL   TSH 3RD GENERATION    Collection Time: 07/25/20  2:10 PM   Result Value Ref Range    TSH 4.12 (H) 0.36 - 3.74 uIU/mL   TROPONIN I    Collection Time: 07/25/20  5:30 PM   Result Value Ref Range    Troponin-I, QT 0.06 (H) 0.0 - 0.045 NG/ML   TROPONIN I    Collection Time: 07/25/20  7:30 PM   Result Value Ref Range    Troponin-I, QT 0.10 (H) 0.0 - 0.045 NG/ML       Imaging Reviewed:    CXR - reviewed           Javier Liao MD  7/25/2020, 9:07 PM

## 2020-07-26 NOTE — ED NOTES
TRANSFER - ED to INPATIENT REPORT:    Verbal report given on Peggy P Jenine Oppenheim  being transferred to 2200(unit) for routine progression of care       Report consisted of patients Situation, Background, Assessment and   Recommendations(SBAR). SBAR report made available to receiving floor on this patient being transferred to [address] [place] [address] (2200)  for routine progression of care       Admitting diagnosis SVT (supraventricular tachycardia) ([facility] [address] [I47.1]    Information from the following report(s) SBAR, ED Summary, MAR and Recent Results was made available to receiving floor. Lines:   Peripheral IV 07/25/20 Right Antecubital (Active)   Site Assessment Clean, dry, & intact 07/25/20 1435   Phlebitis Assessment 0 07/25/20 1435   Infiltration Assessment 0 07/25/20 1435   Dressing Status Clean, dry, & intact 07/25/20 1435   Dressing Type Transparent 07/25/20 1435        HCG Status for ALL Females under 55 y/o: NO     Medication list updated today    Opportunity for questions and clarification was provided. Patient is oriented to time, place, person and situation .   Patient is  continent and ambulatory without assist     Valuables transported with patient     Patient transported with:   Monitor  Registered Nurse    MAP (Monitor): 80 =Monitored (most recent)  Vitals w/ MEWS Score (last day)     Date/Time MEWS Score Pulse Resp Temp BP Level of Consciousness SpO2    07/25/20 2200    66  14    159/60    99 %    07/25/20 2015    71  17        99 %    07/25/20 1900    72  16    151/49    100 %    07/25/20 1845    85  20    152/54    99 %    07/25/20 1830    74  16    140/50    99 %    07/25/20 1815    71  15    142/51    100 %    07/25/20 18:08:39  1  73  19  98.3 °F (36.8 °C)  149/69  Alert  100 %    07/25/20 1800    71  16    138/46    99 %    07/25/20 1630    73  17    140/59    99 %    07/25/20 1615    72  18    137/63    99 %    07/25/20 16:06:36    82  16    134/59  Alert  100 %    07/25/20 1421    94    98.2 °F (36.8 °C)  174/63  Alert  100 %

## 2020-07-26 NOTE — PROGRESS NOTES
Medicine Progress Note    Patient: Surendra Young   Age:  65 y.o.  DOA: 7/25/2020   Admit Dx / CC: SVT (supraventricular tachycardia) ([facility] [address] [I47.1]  LOS:  LOS: 0 days     Assessment/Plan   Active Problems:    SVT (supraventricular tachycardia) ([address] (7/25/2020)        Additional Plan notes     Stress test in am, echo result pending      DISPO        Anticipated Date of Discharge: after stress if negative  Anticipated Disposition (home, SNF) : home    Subjective:   Patient seen and examined. No CP no SOB    Objective:     Visit Vitals  /51   Pulse 61   Temp 98.5 °F (36.9 °C)   Resp 16   Ht 5' 3\" (1.6 m)   Wt 48.1 kg (106 lb)   SpO2 98%   Breastfeeding No   BMI 18.78 kg/m²       Physical Exam:  General appearance: alert, cooperative, no distress, appears stated age  Head: Normocephalic, without obvious abnormality, atraumatic  Neck: supple, trachea midline  Lungs: clear to auscultation bilaterally  Heart: regular rate and rhythm, S1, S2 normal, no murmur, click, rub or gallop  Abdomen: soft, non-tender. Bowel sounds normal. No masses,  no organomegaly  Extremities: extremities normal, atraumatic, no cyanosis or edema  Skin: Skin color, texture, turgor normal. No rashes or lesions  Neurologic: Grossly normal    Intake and Output:  Current Shift:  No intake/output data recorded.   Last three shifts:  07/24 1901 - 07/26 0700  In: 500 [I.V.:500]  Out: 300 [Urine:300]    Lab/Data Reviewed:  CMP:   Lab Results   Component Value Date/Time     07/26/2020 02:15 AM    K 3.8 07/26/2020 02:15 AM     07/26/2020 02:15 AM    CO2 27 07/26/2020 02:15 AM    AGAP 5 07/26/2020 02:15 AM    GLU 80 07/26/2020 02:15 AM    BUN 11 07/26/2020 02:15 AM    CREA 0.62 07/26/2020 02:15 AM    GFRAA >60 07/26/2020 02:15 AM    GFRNA >60 07/26/2020 02:15 AM    CA 8.3 (L) 07/26/2020 02:15 AM    MG 1.9 07/25/2020 02:10 PM    PHOS 2.7 07/25/2020 02:10 PM    ALB 3.5 07/26/2020 02:15 AM    TP 6.4 07/26/2020 02:15 AM    GLOB 2.9 07/26/2020 02:15 AM    AGRAT 1.2 07/26/2020 02:15 AM    ALT 19 07/26/2020 02:15 AM     CBC:   Lab Results   Component Value Date/Time    WBC 6.9 07/26/2020 02:15 AM    HGB 13.1 07/26/2020 02:15 AM    HCT 38.9 07/26/2020 02:15 AM     07/26/2020 02:15 AM     All Cardiac Markers in the last 24 hours:   Lab Results   Component Value Date/Time    TROIQ 0.06 (H) 07/26/2020 02:15 AM    TROIQ 0.10 (H) 07/25/2020 07:30 PM    TROIQ 0.06 (H) 07/25/2020 05:30 PM    TROIQ <0.02 07/25/2020 02:10 PM       Medications Reviewed:  Current Facility-Administered Medications   Medication Dose Route Frequency    acetaminophen (TYLENOL) tablet 650 mg  650 mg Oral Q6H PRN    ondansetron (ZOFRAN) injection 4 mg  4 mg IntraVENous Q6H PRN    0.9% sodium chloride infusion  75 mL/hr IntraVENous CONTINUOUS    heparin (porcine) injection 5,000 Units  5,000 Units SubCUTAneous Q8H    aspirin delayed-release tablet 81 mg  81 mg Oral DAILY    metoprolol succinate (TOPROL-XL) XL tablet 25 mg  25 mg Oral DAILY       Denton Mckeon MD    July 26, 2020

## 2020-07-26 NOTE — PROGRESS NOTES
0400 - In patient's chart to document the PIV inserted gauge 20 to left forearm. Pt tolerated well. Pt assisted out of bed ambulated to bathroom voided 300 ml of maxine urine and back to bed made comfortable. IVF infusing well, HOB elevated, bed low and in locked position call light within reach. Pt appreciative, No concerns voiced.

## 2020-07-27 ENCOUNTER — HOSPITAL ENCOUNTER (OUTPATIENT)
Dept: NON INVASIVE DIAGNOSTICS | Age: 70
Setting detail: OBSERVATION
Discharge: HOME | End: 2020-07-27
Attending: INTERNAL MEDICINE
Payer: MEDICARE

## 2020-07-27 ENCOUNTER — HOSPITAL ENCOUNTER (OUTPATIENT)
Dept: NUCLEAR MEDICINE | Age: 70
Setting detail: OBSERVATION
Discharge: HOME | End: 2020-07-27
Attending: INTERNAL MEDICINE
Payer: MEDICARE

## 2020-07-27 VITALS
BODY MASS INDEX: 18.78 KG/M2 | HEIGHT: 63 IN | SYSTOLIC BLOOD PRESSURE: 169 MMHG | WEIGHT: 106 LBS | DIASTOLIC BLOOD PRESSURE: 64 MMHG

## 2020-07-27 VITALS
HEIGHT: 63 IN | WEIGHT: 106 LBS | TEMPERATURE: 97.7 F | RESPIRATION RATE: 20 BRPM | OXYGEN SATURATION: 100 % | BODY MASS INDEX: 18.78 KG/M2 | DIASTOLIC BLOOD PRESSURE: 61 MMHG | SYSTOLIC BLOOD PRESSURE: 149 MMHG | HEART RATE: 62 BPM

## 2020-07-27 LAB
ATRIAL RATE: 73 BPM
CALCULATED P AXIS, ECG09: 79 DEGREES
CALCULATED R AXIS, ECG10: 33 DEGREES
CALCULATED T AXIS, ECG11: 74 DEGREES
DIAGNOSIS, 93000: NORMAL
P-R INTERVAL, ECG05: 122 MS
Q-T INTERVAL, ECG07: 400 MS
QRS DURATION, ECG06: 92 MS
QTC CALCULATION (BEZET), ECG08: 440 MS
STRESS BASELINE HR: 90 BPM
STRESS ESTIMATED WORKLOAD: 1.4 METS
STRESS EXERCISE DUR MIN: NORMAL
STRESS PEAK DIAS BP: 86 MMHG
STRESS PEAK SYS BP: 172 MMHG
STRESS PERCENT HR ACHIEVED: 80 %
STRESS POST PEAK HR: 118 BPM
STRESS RATE PRESSURE PRODUCT: NORMAL BPM*MMHG
STRESS TARGET HR: 148 BPM
VENTRICULAR RATE, ECG03: 73 BPM

## 2020-07-27 PROCEDURE — 74011250637 HC RX REV CODE- 250/637: Performed by: HOSPITALIST

## 2020-07-27 PROCEDURE — 99218 HC RM OBSERVATION: CPT

## 2020-07-27 PROCEDURE — 96372 THER/PROPH/DIAG INJ SC/IM: CPT

## 2020-07-27 PROCEDURE — 74011250636 HC RX REV CODE- 250/636: Performed by: INTERNAL MEDICINE

## 2020-07-27 PROCEDURE — 74011250636 HC RX REV CODE- 250/636: Performed by: HOSPITALIST

## 2020-07-27 PROCEDURE — A9500 TC99M SESTAMIBI: HCPCS

## 2020-07-27 PROCEDURE — 93017 CV STRESS TEST TRACING ONLY: CPT

## 2020-07-27 PROCEDURE — 74011250637 HC RX REV CODE- 250/637: Performed by: INTERNAL MEDICINE

## 2020-07-27 RX ORDER — METOPROLOL SUCCINATE 25 MG/1
25 TABLET, EXTENDED RELEASE ORAL DAILY
Qty: 30 TAB | Refills: 0 | Status: SHIPPED | OUTPATIENT
Start: 2020-07-28 | End: 2020-08-31 | Stop reason: SDUPTHER

## 2020-07-27 RX ADMIN — SODIUM CHLORIDE 75 ML/HR: 900 INJECTION, SOLUTION INTRAVENOUS at 06:35

## 2020-07-27 RX ADMIN — HEPARIN SODIUM 5000 UNITS: 5000 INJECTION INTRAVENOUS; SUBCUTANEOUS at 00:13

## 2020-07-27 RX ADMIN — METOPROLOL SUCCINATE 25 MG: 25 TABLET, EXTENDED RELEASE ORAL at 11:28

## 2020-07-27 RX ADMIN — ASPIRIN 81 MG: 81 TABLET, COATED ORAL at 11:29

## 2020-07-27 RX ADMIN — ACETAMINOPHEN 650 MG: 325 TABLET, FILM COATED ORAL at 00:13

## 2020-07-27 RX ADMIN — REGADENOSON 0.4 MG: 0.08 INJECTION, SOLUTION INTRAVENOUS at 10:21

## 2020-07-27 NOTE — PROGRESS NOTES
Discharge/Transition Planning  Problem: Discharge Planning  Goal: *Discharge to safe environment  Outcome: Resolved/Met   Plan home    Care Management following and chart reviewed.  Plan remains home when medically stable      Minerva Nair RN BSN  Outcomes Manager  Office # [phone]  Pager # [phone]

## 2020-07-27 NOTE — ROUTINE PROCESS
2000 Bedside and Verbal shift change report given to [facility] RN (oncoming nurse) by me (offgoing nurse). Report included the following information SBAR, Kardex, Intake/Output, MAR, Recent Results and Cardiac Rhythm NSR.

## 2020-07-27 NOTE — DISCHARGE SUMMARY
2 [facility]  Hospitalist Division    Discharge Summary    Patient: Michelle Fuentes MRN: [id number]  [facility]: [id number]    YOB: 1950  Age: 65 y.o. Sex: female    DOA: 7/25/2020 LOS:  LOS: 0 days   Discharge Date:      Admission Diagnoses: SVT (supraventricular tachycardia) ([facility] [address] [I47.1]    Discharge Diagnoses: Active Problems:    SVT (supraventricular tachycardia) ([address] (7/25/2020)        Discharge Condition: Stable    Discharge To: Home    Consults: Cardiology    HPI: Michelle Fuentes is a 65 y.o. female who is being admitted to the hospital secondary to SVT. Patient mentions that she was out working in her yard when she felt her heart racing. She mentions that it was a hot day and she did not drink enough water. She checked her pulse at home and it was in the 200s and decided to come to the emergency room for further evaluation. Patient mentions that this is happened to her in the past also with the most recent event being 2 weeks ago however it resolved by itself. ER evaluation patient noted to be in SVT, given adenosine 6 mg with resolution of heart rate. She is currently in sinus rhythm. Cardiology consulted by ER and initial plan was to discharge patient home however patient noted to have a mild elevation in troponin and is being admitted on observation status. Patient is currently symptom-free and in no acute distress. Hospital Course: Echo with normal EF. Troponin remained flat. Stress test without significant abnormality. She was started on Toprol XL and tolerated well. Vs and labs stable for discharge. Physical Exam:  General appearance: alert, cooperative, no distress, appears stated age  Head: Normocephalic, without obvious abnormality, atraumatic  Lungs: clear to auscultation bilaterally  Heart: regular rate and rhythm, S1, S2 normal, no murmur, click, rub or gallop  Abdomen: soft, non-tender.  Bowel sounds normal. No masses,  no organomegaly  Extremities: no cyanosis or edema  Skin: Skin color, texture normal. No rashes or lesions  Neurologic: no focal deficits, motor/sensory intact  PSY: Mood and affect normal, appropriately behaved    Significant Diagnostic Studies: All lab results for the last 24 hours reviewed. Echo 7/26/20  Interpretation Summary     Result status: Final result    · LV: Normal cavity size, wall thickness and systolic function (ejection fraction normal). Estimated left ventricular ejection fraction is 55 - 60%. Visually measured ejection fraction. Inconclusive left ventricular diastolic function. · MV: Trace mitral valve regurgitation is present. · TV: Mild tricuspid valve regurgitation is present. · PA: Pulmonary arterial systolic pressure is 22 mmHg. Echo Findings     Left Ventricle  Normal cavity size, wall thickness and systolic function (ejection fraction normal). The estimated ejection fraction is 55 - 60%. Visually measured ejection fraction. There is inconclusive left ventricular diastolic function. Elevated left ventricular diastolic pressure. Wall Scoring  The left ventricular wall motion is normal.             Left Atrium  Normal cavity size. Right Ventricle  Normal cavity size and global systolic function. Right Atrium  Normal cavity size. Aortic Valve  Normal valve structure and no stenosis. Trace aortic valve regurgitation. Mitral Valve  Normal valve structure and no stenosis. Trace regurgitation. Tricuspid Valve  Normal valve structure and no stenosis. Mild regurgitation. Pulmonic Valve  Pulmonic valve not well visualized. No stenosis. Trace regurgitation. Pulmonary Artery  Pulmonary arteries not assessed and pulmonary arteries not well visualized. Pulmonary arterial systolic pressure (PASP) is 22 mmHg. Pulmonary hypertension not suggested by Doppler findings.     IVC/Hepatic Veins  Normal central venous pressure (0-5 mmHg); IVC diameter is less than 21 mm and collapses more than 50% with respiration. Pericardium  No evidence of pericardial effusion. Xr Chest Port    Result Date: 7/25/2020  EXAM: CHEST RADIOGRAPH, SINGLE VIEW CLINICAL INDICATION/HISTORY: svt      <Additional:  None. COMPARISON: 9/15/2007 TECHNIQUE: Portable frontal view of the chest was obtained. _______________ FINDINGS: SUPPORT DEVICES: None. HEART AND MEDIASTINUM: Cardiomediastinal silhouette appears within normal limits. LUNGS AND PLEURAL SPACES: Pulmonary vessels are normal.  Lungs are clear. Costophrenic angles are sharp. No pneumothorax. BONY THORAX AND SOFT TISSUES: No acute osseous abnormality. No acute change. _______________     IMPRESSION: No active cardiopulmonary disease. Procedures:   Nuclear Stress Test     Myocardial perfusion imaging supports a low risk stress test.    Interpretation Summary     · Baseline ECG: Normal EKG, non-specific ST-T wave abnormalities. · Gated SPECT: Left ventricular function post-stress was normal. Calculated ejection fraction is 61%. · There was no convincing evidence of significant reversible defect to suggest on going major ischemia. No significant fixed defect. · Myocardial perfusion imaging supports a low risk stress test.      Stress Findings     ECG  Baseline ECG: Normal EKG, non-specific ST-T wave abnormalities. There were no arrhythmias during stress. There were no arrhythmias during recovery. ECG is non-diagnostic due to resting ST abnormalities. Stress Findings  A pharmacological stress test was performed using regadenoson. The patient reached the end of the protocol. The patient reported dyspnea on exertion during stress. Discharge Medications:     Current Discharge Medication List      START taking these medications    Details   metoprolol succinate (TOPROL-XL) 25 mg XL tablet Take 1 Tab by mouth daily.   Qty: 30 Tab, Refills: 0         CONTINUE these medications which have NOT CHANGED    Details   gabapentin (NEURONTIN) 100 mg capsule Take 1 Cap by mouth three (3) times daily. Max Daily Amount: 300 mg. Qty: 30 Cap, Refills: 1    Associated Diagnoses: Left sided sciatica      dextran 70-hypromellose (ARTIFICIAL TEARS,[id number],) ophthalmic solution Administer 1 Drop to both eyes as needed. fexofenadine-pseudoephedrine (ALLEGRA-D 12 HOUR)  mg Tb12 Take 1 Tab by mouth every twelve (12) hours. polyethylene glycol (MIRALAX) 17 gram packet Take 1 Packet by mouth daily. Qty: 100 Each, Refills: 12    Associated Diagnoses: Constipation, unspecified constipation type      tiZANidine (ZANAFLEX) 4 mg tablet Take 1 Tab by mouth three (3) times daily as needed for Pain. Qty: 30 Tab, Refills: 1    Associated Diagnoses: Primary osteoarthritis involving multiple joints; Neck sprain, subsequent encounter; Strain of left trapezius muscle, subsequent encounter      diclofenac (VOLTAREN) 1 % gel Apply 2 g to affected area four (4) times daily.  For hand pain  Qty: 100 g, Refills: 12    Associated Diagnoses: Primary osteoarthritis involving multiple joints             Activity: Activity as tolerated    Diet: Resume previous diet    Wound Care: None needed    Follow-up: 1 week with PCP    Discharge time: >35 minutes    RAJIV [facility]  Office:  [phone]  Pager: [phone]      7/27/2020, 11:48 AM

## 2020-07-27 NOTE — DISCHARGE INSTRUCTIONS
DISCHARGE SUMMARY from Nurse    PATIENT INSTRUCTIONS:    After general anesthesia or intravenous sedation, for 24 hours or while taking prescription Narcotics:  · Limit your activities  · Do not drive and operate hazardous machinery  · Do not make important personal or business decisions  · Do  not drink alcoholic beverages  · If you have not urinated within 8 hours after discharge, please contact your surgeon on call. Report the following to your surgeon:  · Excessive pain, swelling, redness or odor of or around the surgical area  · Temperature over 100.5  · Nausea and vomiting lasting longer than 4 hours or if unable to take medications  · Any signs of decreased circulation or nerve impairment to extremity: change in color, persistent  numbness, tingling, coldness or increase pain  · Any questions    What to do at Home:  Recommended activity: Activity as tolerated    If you experience any of the following symptoms feeling of heart racing, palpitations, worsening shortness of breath, fever, or chills, please follow up with primary care physician/cardiologist/emergency room. *  Please give a list of your current medications to your Primary Care Provider. *  Please update this list whenever your medications are discontinued, doses are      changed, or new medications (including over-the-counter products) are added. *  Please carry medication information at all times in case of emergency situations. These are general instructions for a healthy lifestyle:    No smoking/ No tobacco products/ Avoid exposure to second hand smoke  Surgeon General's Warning:  Quitting smoking now greatly reduces serious risk to your health.     Obesity, smoking, and sedentary lifestyle greatly increases your risk for illness    A healthy diet, regular physical exercise & weight monitoring are important for maintaining a healthy lifestyle    You may be retaining fluid if you have a history of heart failure or if you experience any of the following symptoms:  Weight gain of 3 pounds or more overnight or 5 pounds in a week, increased swelling in our hands or feet or shortness of breath while lying flat in bed. Please call your doctor as soon as you notice any of these symptoms; do not wait until your next office visit. The discharge information has been reviewed with the patient. The patient verbalized understanding. Discharge medications reviewed with the patient and appropriate educational materials and side effects teaching were provided. Patient armband removed and shredded.

## 2020-07-27 NOTE — PROGRESS NOTES
Cardiovascular Specialists -    Consultation request by Maria D Virgen MD for advice/opinion related to evaluating SVT (supraventricular tachycardia) ([facility].) [I47.1]    Date of  Admission: 7/25/2020  2:06 PM   Primary Care Physician:  Aliza Amin MD    Subjective:  No CP or SOB  No palpitations     Assessment:     -SVT/narrow complex tachycardia at almost 200 bpm.  Responsive to adenosine  -History of ovarian cancer  -Osteoarthritis     Plan:     Patient came to hospital with palpitation and chest pressure. EKG confirmed narrow complex tachycardia with very elevated heart rate and diffuse ST depression and subendocardial ischemia  MI has been ruled out  Patient had another episode of chest pressure and also palpitation before    -Echo images and stress test reviewed. Normal EF. No ischemia  - Continue Toprol-XL 25 mg daily. Potential role of cardiac ablation discussed with the patient in future if not responsive to Toprol  - No further cardiac work up planned. History of Present Illness: This is a 65 y.o. female admitted for SVT (supraventricular tachycardia) ([facility].) [I47.1]. Patient complains of:      Ms. Virginie Mcnamara is 68-year-old female with no significant past medical history   She came to hospital after having a palpitation and rapid heartbeat which caused chest pressure as well. This lasted for about less than 30 minutes. She called EMT. She had EKG which showed narrow complex tachycardia and adenosine was given   She had similar episode of chest pressure and palpitation about a week ago when she thought her heart rate was 200 bpm based on pulse monitoring she has at home.     She is asymptomatic    cardiac risk factors: none      Physical Exam:     Visit Vitals  /61   Pulse 62   Temp 97.7 °F (36.5 °C)   Resp 20   Ht 5' 3\" (1.6 m)   Wt 106 lb (48.1 kg)   SpO2 100%   Breastfeeding No   BMI 18.78 kg/m²     BP Readings from Last 3 Encounters:   07/27/20 149/61   07/27/20 169/64   12/17/19 148/75 Pulse Readings from Last 3 Encounters:   07/27/20 62   12/17/19 86   12/04/19 80     Wt Readings from Last 3 Encounters:   07/26/20 106 lb (48.1 kg)   07/27/20 106 lb (48.1 kg)   12/17/19 117 lb 9.6 oz (53.3 kg)       General:  alert, cooperative, no distress, appears stated age  Neck:  no carotid bruit, no JVD  Lungs:  clear to auscultation bilaterally  Heart:  regular rate and rhythm, S1, S2 normal  Abdomen:  abdomen is soft   Extremities:  extremities normal, atraumatic, no cyanosis or edema  Skin: Warm and dry.  no hyperpigmentation, vitiligo, or suspicious lesions       Data Review:     Recent Labs     07/26/20  0215 07/25/20  1410   WBC 6.9 7.1   HGB 13.1 12.9   HCT 38.9 37.5    245     Recent Labs     07/26/20  0215 07/25/20  1410    133*   K 3.8 3.8    100   CO2 27 26   GLU 80 129*   BUN 11 14   CREA 0.62 0.78   CA 8.3* 8.0*   MG  --  1.9   PHOS  --  2.7   ALB 3.5 3.6   ALT 19 20       Results for orders placed or performed during the hospital encounter of 07/25/20   EKG, 12 LEAD, INITIAL   Result Value Ref Range    Ventricular Rate 94 BPM    Atrial Rate 94 BPM    P-R Interval 118 ms    QRS Duration 98 ms    Q-T Interval 362 ms    QTC Calculation (Bezet) 452 ms    Calculated P Axis 74 degrees    Calculated R Axis 26 degrees    Calculated T Axis 68 degrees    Diagnosis       Normal sinus rhythm  Possible Left atrial enlargement  Incomplete right bundle branch block  Nonspecific ST abnormality  Abnormal ECG  No previous ECGs available  Confirmed by Masha Loretto ([id number]) on 7/26/2020 11:23:55 AM     Results for orders placed or performed in visit on 10/24/12   AMB POC EKG ROUTINE W/ 12 LEADS, INTER & REP    Narrative    NSR         All Cardiac Markers in the last 24 hours:    No results found for: CPK, CK, CKMMB, CKMB, RCK3, CKMBT, CKNDX, CKND1, MIKE, TROPT, TROIQ, JAMES, TROPT, TNIPOC, BNP, BNPP    Last Lipid:    Lab Results   Component Value Date/Time    Cholesterol, total 197 07/26/2020 02:15 AM    HDL Cholesterol 84 (H) 07/26/2020 02:15 AM    LDL, calculated 101 (H) 07/26/2020 02:15 AM    Triglyceride 60 07/26/2020 02:15 AM    CHOL/HDL Ratio 2.3 07/26/2020 02:15 AM       Signed By: Yared Starr MD     July 27, 2020

## 2020-07-27 NOTE — ROUTINE PROCESS
0730:  Bedside and Verbal shift change report given to Indy Velarde RN (oncoming nurse) by SHAHANA Chiang (offgoing nurse). Report included the following information SBAR, Kardex, MAR and Recent Results. Patient resting comfortably in bed, on telemetry, voiced no concerns. Bed in lowest/locked position with call bell/phone in reach. 0830:  Patient left unit for NUC MED via stretcher with transport. 1107: Patient arrived back to unit via stretcher with transport. 1146:  Patient discharge orders written. 1257:  Patient d/c off unit via wheelchair with discharge RN.

## 2020-07-27 NOTE — PROGRESS NOTES
Discharge instructions provided to pt on behalf of primary nurse [facility]. Prescription was e-scribed to pt preferred [facility]. Peripheral iv and telemetry monitor removed. Pt completing lunch tray and will have her  pick her up.

## 2020-07-27 NOTE — PROGRESS NOTES
Bedside report received from [address] [place] [address]    2100: pt resting in bed, AOX4, denies pain or discomforts, on room air, shift assessment completed, bed locked and low position, call bell within reach    2330: pt resting in bed, offered snacks    0013: Tylenol 650 mg PO given for pain    0100: sleeping, no change from previous assessment    0340: sleeping, IVF infusing well    0500: pt sleeping, no distress noted, continue to monitor    Bedside and Verbal shift change report given to [address] [place] [address] (oncoming nurse) by Mahamed Marte RN (offgoing nurse). Report included the following information SBAR, Kardex, ED Summary, Intake/Output, MAR, Recent Results and Cardiac Rhythm SR on tele # 30.

## 2020-07-27 NOTE — PHYSICIAN ADVISORY
Letter of Status Determination: Current Status OBSERVATION is Appropriate Pt Name:  Marina Virk MR#  [id number] [facility]#   [id number] Room and Hospital  2220/01  @ [address] Hospitalization date  7/25/2020  2:06 PM  
Current Attending Physician  Annette Simmons MD  
Principal diagnosis  Tachycardia Clinicals  65 y.o. y.o  female hospitalized with SVT, ER evaluation patient noted to be in SVT, given adenosine 6 mg with resolution of heart rate. HR stable this am, Nuc stress/ECHO pending this am  
  
[facility] criteria M-510 STATUS DETERMINATION  On the basis of clinical data, available documentaion, we believe that the current status of this patient as OBSERVATION is Appropriate The final decision of the patient's hospitalization status depends on the attending physician's judgment Additional comments Insurance  Payor: [employer] MEDICARE / Plan: [address] / Product Type: Managed Care Medicare / Insurance Information [address] [place] [address] Phone: [phone] Subscriber: Alex Reynaga Subscriber#: [id number] Group#: [id number] Precert#:   
  
 
  
 
 
 
 
Janice Munoz MD 
Cell: [phone] Physician Advisor

## 2020-07-27 NOTE — PROGRESS NOTES
Problem: Falls - Risk of  Goal: *Absence of Falls  Description: Document Katie Arias Fall Risk and appropriate interventions in the flowsheet.   Outcome: Progressing Towards Goal  Note: Fall Risk Interventions:            Medication Interventions: Teach patient to arise slowly                   Problem: Patient Education: Go to Patient Education Activity  Goal: Patient/Family Education  Outcome: Progressing Towards Goal

## 2020-07-27 NOTE — PROGRESS NOTES
Problem: Falls - Risk of  Goal: *Absence of Falls  Description: Document Edith Hung Fall Risk and appropriate interventions in the flowsheet.   Outcome: Progressing Towards Goal  Note: Fall Risk Interventions:            Medication Interventions: Patient to call before getting OOB, Teach patient to arise slowly                   Problem: Patient Education: Go to Patient Education Activity  Goal: Patient/Family Education  Outcome: Progressing Towards Goal

## 2020-07-28 ENCOUNTER — DOCUMENTATION ONLY (OUTPATIENT)
Dept: FAMILY MEDICINE CLINIC | Age: 70
End: 2020-07-28

## 2020-07-28 NOTE — PROGRESS NOTES
7/26/2020 admitted to [facility]  7/27/2020 discharged to home  Called pt to discuss her hospitalization 2 days ago when she had nausea and palpitations and was diagnosed with SVT with 200 bpm  She was confused about the metoprolol dose; she received both tartrate and succinate from the pharmacy. She took 12.5 mg metoprolol tartrate today. Her heart rate was 51 bpm at home. She c/o fatigue with her low pulse. She is instructed to take 1/2 tab of metoprolol XL 25 mg. She voiced understanding.

## 2020-07-28 NOTE — Clinical Note
Please schedule transition of care visit for tomorrow and double book at [address].  Thank you.

## 2020-07-29 ENCOUNTER — OFFICE VISIT (OUTPATIENT)
Dept: FAMILY MEDICINE CLINIC | Age: 70
End: 2020-07-29

## 2020-07-29 VITALS
HEART RATE: 69 BPM | SYSTOLIC BLOOD PRESSURE: 162 MMHG | WEIGHT: 114 LBS | HEIGHT: 63 IN | OXYGEN SATURATION: 99 % | DIASTOLIC BLOOD PRESSURE: 77 MMHG | BODY MASS INDEX: 20.2 KG/M2 | TEMPERATURE: 97.9 F | RESPIRATION RATE: 16 BRPM

## 2020-07-29 DIAGNOSIS — I47.1 SVT (SUPRAVENTRICULAR TACHYCARDIA) (HCC): Primary | ICD-10-CM

## 2020-07-29 DIAGNOSIS — M15.9 PRIMARY OSTEOARTHRITIS INVOLVING MULTIPLE JOINTS: ICD-10-CM

## 2020-07-29 DIAGNOSIS — C56.9 MALIGNANT NEOPLASM OF OVARY, UNSPECIFIED LATERALITY (HCC): ICD-10-CM

## 2020-07-29 RX ORDER — HYDROCODONE BITARTRATE AND ACETAMINOPHEN 5; 325 MG/1; MG/1
TABLET ORAL
COMMUNITY
End: 2020-08-19 | Stop reason: ALTCHOICE

## 2020-07-29 RX ORDER — AMOXICILLIN 875 MG/1
875 TABLET, FILM COATED ORAL 2 TIMES DAILY
COMMUNITY
End: 2020-08-19 | Stop reason: ALTCHOICE

## 2020-07-29 NOTE — PROGRESS NOTES
Bunny Paula is a 65 y.o.  female and presents with    Chief Complaint   Patient presents with   [facility] Follow Up   7/25/2020 admitted to [facility] for SVT  7/27/2020 discharged to home  7/28/2020 called by physician to discuss medications  Subjective:  She is here for transition of care after admission to [facility] for SVT;Patient states she started noticing her heart beating fast 4 days ago in the morning after exerting herself in the yard. Going up to 220 bpm at home. She states last time this happened was about a week ago. No increasing or decreasing factors. She received adenosine 6 mg in ambulance. She also received 324 mg of aspirin in route. She states she did have a stress test about 4 to 5 years ago when her heart rate this fast.  there were no abnormalities at that time. She states she had a little uncomfortable feeling when her heart was beating that fast she underwent nuclear stress test; she did not have reversible ischemia; she is taking metoprolol XL 12.5 mg after clarifying dose and medication yesterday with PCP. She took the dose 4 hours ago. Neck Pain  She has neck and back pain intermittentlyOnset of symptoms was a few weeks ago, unchanged since that time. Current symptoms are pain in neck (shooting in character; 3/10 in severity). Patient reports numbness, tingling, paresthesias in upper extremities. Patient reports weakness, diminished  strength, lack of coordination. Radiation of pain:  Event that precipitate these symptoms: recurrent use. Patient has had recurrent self limited episodes of neck pain in the past.  Previous treatments include: physical therapy:  Somewhat effective. ROS   General ROS: negative for - chills or fever; some fatigue with medication  Psychological ROS: negative for - anxiety or depression  Ophthalmic ROS: positive for - uses glasses  ENT ROS: negative for - headaches; She does have allergies so occasionally has nasal congestion.    Endocrine ROS: negative for - polydipsia/polyuria or temperature intolerance  Respiratory ROS: no cough, shortness of breath, or wheezing  Cardiovascular ROS: positive for - dyspnea on exertion; no chest pain  Gastrointestinal ROS: no abdominal pain, change in bowel habits, or black or bloody stools  Genito-Urinary ROS: no dysuria, trouble voiding, or hematuria  Musculoskeletal ROS: negative for - joint pain or muscle pain  Neurological ROS: no TIA or stroke symptoms  Dermatological ROS: negative for - rash or skin lesion changes    All other systems reviewed and are negative. Objective:  Vitals:    07/29/20 1641   BP: 162/77   Pulse: 69   Resp: 16   Temp: 97.9 °F (36.6 °C)   TempSrc: Oral   SpO2: 99%   Weight: 114 lb (51.7 kg)   Height: 5' 3\" (1.6 m)   PainSc:   0 - No pain     /73  alert, well appearing, and in no distress, oriented to person, place, and time and normal appearing weight  Mental status - normal mood, behavior, speech, dress, motor activity, and thought processes  Mouth - mucous membranes moist, pharynx normal without lesions  Chest - clear to auscultation, no wheezes, rales or rhonchi, symmetric air entry  Heart - normal rate, regular rhythm, normal S1, S2, no murmurs, rubs, clicks or gallops  Neurological - cranial nerves II through XII intact  Skin - normal coloration and turgor, no rashes, no suspicious skin lesions noted    LABS     HDL 84  Normal cardiac enzymes  TESTS  Chest xray  IMPRESSION:     No active cardiopulmonary disease. Assessment/Plan:    1. SVT (supraventricular tachycardia) (HCC)  Metoprolol 12.5 mg prescribed; f/u with cardiology    2. Malignant neoplasm of ovary, unspecified laterality ([facility]  Followed by Dr. Sue Chavez in gynecology oncology    3.  Primary osteoarthritis involving multiple joints  Continue nsaid as needed      Lab review: labs reviewed, I note that lipids LDL result meets goal, HDL low, triglycerides normal, electrolytes normal, renal functions normal, hemogram normal      I have discussed the diagnosis with the patient and the intended plan as seen in the above orders. The patient has received an after-visit summary and questions were answered concerning future plans. I have discussed medication side effects and warnings with the patient as well. I have reviewed the plan of care with the patient, accepted their input and they are in agreement with the treatment goals.

## 2020-07-29 NOTE — PROGRESS NOTES
Cyn Mulligan presents today for   Chief Complaint   Patient presents with   [facility] Follow Up       Is someone accompanying this pt? no    Is the patient using any DME equipment during OV? no    Depression Screening:  3 most recent PHQ Screens 12/17/2019   PHQ Not Done -   Little interest or pleasure in doing things Not at all   Feeling down, depressed, irritable, or hopeless Not at all   Total Score PHQ 2 0       Learning Assessment:  Learning Assessment 4/2/2018   PRIMARY LEARNER Patient   PRIMARY LANGUAGE ENGLISH   LEARNER PREFERENCE PRIMARY DEMONSTRATION     -     -   ANSWERED BY patient   RELATIONSHIP SELF       Abuse Screening:  Abuse Screening Questionnaire 4/2/2018   Do you ever feel afraid of your partner? N   Are you in a relationship with someone who physically or mentally threatens you? N   Is it safe for you to go home? Y       Fall Risk  Fall Risk Assessment, last 12 mths 4/20/2020   Able to walk? Yes   Fall in past 12 months? No       Health Maintenance reviewed and discussed and ordered per Provider. Health Maintenance Due   Topic Date Due    Shingrix Vaccine Age 49> (1 of 2) 04/08/1998    Colonoscopy  01/13/2019    Breast Cancer Screen Mammogram  02/08/2019    GLAUCOMA SCREENING Q2Y  03/30/2020   . Coordination of Care:  1. Have you been to the ER, urgent care clinic since your last visit? Hospitalized since your last visit? Yes, 7/25/20, [place], SVT tachycardia    2. Have you seen or consulted any other health care providers outside of the [address] [place] [address] since your last visit? Include any pap smears or colon screening. no      Last  Checked na  Last UDS Checked na  Last Pain contract signed: na    Patient presents in office today for routine care.   Patient concerns: [facility] follow up

## 2020-08-11 ENCOUNTER — VIRTUAL VISIT (OUTPATIENT)
Dept: FAMILY MEDICINE CLINIC | Age: 70
End: 2020-08-11

## 2020-08-11 DIAGNOSIS — M62.838 MUSCLE SPASM: Primary | ICD-10-CM

## 2020-08-11 DIAGNOSIS — I47.1 SVT (SUPRAVENTRICULAR TACHYCARDIA) (HCC): ICD-10-CM

## 2020-08-11 DIAGNOSIS — M54.32 LEFT SIDED SCIATICA: ICD-10-CM

## 2020-08-11 RX ORDER — CYCLOBENZAPRINE HCL 10 MG
10 TABLET ORAL
Qty: 90 TAB | Refills: 1 | Status: SHIPPED | OUTPATIENT
Start: 2020-08-11 | End: 2020-08-19 | Stop reason: ALTCHOICE

## 2020-08-19 ENCOUNTER — OFFICE VISIT (OUTPATIENT)
Dept: CARDIOLOGY CLINIC | Age: 70
End: 2020-08-19

## 2020-08-19 VITALS
DIASTOLIC BLOOD PRESSURE: 64 MMHG | OXYGEN SATURATION: 99 % | BODY MASS INDEX: 20.69 KG/M2 | WEIGHT: 116.8 LBS | SYSTOLIC BLOOD PRESSURE: 156 MMHG | HEART RATE: 68 BPM | TEMPERATURE: 98 F | RESPIRATION RATE: 18 BRPM

## 2020-08-19 DIAGNOSIS — I47.1 SVT (SUPRAVENTRICULAR TACHYCARDIA) (HCC): Primary | ICD-10-CM

## 2020-08-19 DIAGNOSIS — I10 ESSENTIAL HYPERTENSION WITH GOAL BLOOD PRESSURE LESS THAN 140/90: ICD-10-CM

## 2020-08-19 NOTE — PROGRESS NOTES
Cardiovascular Specialists    Ms. Dudley Blandon is 69-year-old female with a history of SVT, ovarian cancer    Patient is here today for follow-up appointment. She was recently admitted to the hospital with palpitation and some chest pressure. She was found to have a narrow complex tachycardia at 200 bpm.  Adenosine was going given with successful termination to sinus rhythm. Since she has gone home, she had no more episode of fluttering sensation in the chest or palpitation. She is taking her medication regularly. She denies any presyncope or syncope. She denies chest pain or chest tightness to be concern of angina. Denies any nausea, vomiting, abdominal pain, fever, chills, sputum production. No hematuria or other bleeding complaints    Past Medical History:   Diagnosis Date    Alopecia 1/13/2011    Arthritis     Halitosis 1/13/2011    Menopause     Ovarian cancer ([facility]     02/1998    Restless leg syndrome     SVT (supraventricular tachycardia) (HCC)          Past Surgical History:   Procedure Laterality Date    HX CATARACT REMOVAL Right     HX GYN      HX HYSTERECTOMY         Current Outpatient Medications   Medication Sig    metoprolol succinate (TOPROL-XL) 25 mg XL tablet Take 1 Tab by mouth daily. (Patient taking differently: Take 25 mg by mouth two (2) times a day.)    diclofenac (VOLTAREN) 1 % gel Apply 2 g to affected area four (4) times daily. For hand pain    dextran 70-hypromellose (ARTIFICIAL TEARS,[id number],) ophthalmic solution Administer 1 Drop to both eyes as needed.  fexofenadine-pseudoephedrine (ALLEGRA-D 12 HOUR)  mg Tb12 Take 1 Tab by mouth every twelve (12) hours.  polyethylene glycol (MIRALAX) 17 gram packet Take 1 Packet by mouth daily. No current facility-administered medications for this visit.         Allergies and Sensitivities:  No Known Allergies    Family History:  Family History   Problem Relation Age of Onset    Heart Disease Mother        Social History:  Social History     Tobacco Use    Smoking status: Never Smoker    Smokeless tobacco: Never Used   Substance Use Topics    Alcohol use: No    Drug use: No     She  reports that she has never smoked. She has never used smokeless tobacco.  She  reports no history of alcohol use. Review of Systems:  Cardiac symptoms as noted above in HPI. All others negative. Denies fatigue, malaise, skin rash, joint pain, blurring vision, photophobia, neck pain, hemoptysis, chronic cough, nausea, vomiting, hematuria, burning micturition, BRBPR, chronic headaches. Physical Exam:  BP Readings from Last 3 Encounters:   08/19/20 156/64   07/29/20 162/77   07/27/20 169/64         Pulse Readings from Last 3 Encounters:   08/19/20 68   07/29/20 69   07/27/20 62          Wt Readings from Last 3 Encounters:   08/19/20 116 lb 12.8 oz (53 kg)   07/29/20 114 lb (51.7 kg)   07/27/20 106 lb (48.1 kg)       Constitutional: Oriented to person, place, and time. HENT: Head: Normocephalic and atraumatic. Neck: No JVD present. Carotid bruit is not appreciated. Cardiovascular: Regular rhythm. No murmur, gallop or rubs appreciated  Lung: Breath sounds normal. No respiratory distress. No ronchi or rales appreciated  Abdominal: No tenderness. No rebound and no guarding. Musculoskeletal: There is no lower extremity edema.  No cynosis      Review of Data  LABS:   Lab Results   Component Value Date/Time    Sodium 137 07/26/2020 02:15 AM    Potassium 3.8 07/26/2020 02:15 AM    Chloride 105 07/26/2020 02:15 AM    CO2 27 07/26/2020 02:15 AM    Glucose 80 07/26/2020 02:15 AM    BUN 11 07/26/2020 02:15 AM    Creatinine 0.62 07/26/2020 02:15 AM     Lipids Latest Ref Rng & Units 7/26/2020 5/31/2018   Chol, Total <200 MG/ 182   HDL 40 - 60 MG/DL 84(H) 78   LDL 0 - 100 MG/(H) 95   Trig <150 MG/DL 60 45   Chol/HDL Ratio 0 - 5.0   2.3 -   Some recent data might be hidden     Lab Results   Component Value Date/Time    ALT (SGPT) 19 07/26/2020 02:15 AM     Lab Results   Component Value Date/Time    Hemoglobin A1c 5.1 11/24/2015 03:48 PM       EKG    ECHO(07/20)  ft Ventricle  Normal cavity size, wall thickness and systolic function (ejection fraction normal). The estimated ejection fraction is 55 - 60%. Visually measured ejection fraction. There is inconclusive left ventricular diastolic function. Elevated left ventricular diastolic pressure. Wall Scoring  The left ventricular wall motion is normal.             Left Atrium  Normal cavity size. Right Ventricle  Normal cavity size and global systolic function. Right Atrium  Normal cavity size. Aortic Valve  Normal valve structure and no stenosis. Trace aortic valve regurgitation. Mitral Valve  Normal valve structure and no stenosis. Trace regurgitation. Tricuspid Valve  Normal valve structure and no stenosis. Mild regurgitation. Pulmonic Valve  Pulmonic valve not well visualized. No stenosis. Trace regurgitation. Pulmonary Artery  Pulmonary arteries not assessed and pulmonary arteries not well visualized. Pulmonary arterial systolic pressure (PASP) is 22 mmHg. Pulmonary hypertension not suggested by Doppler findings. STRESS TEST (07/20)  · Baseline ECG: Normal EKG, non-specific ST-T wave abnormalities. · Gated SPECT: Left ventricular function post-stress was normal. Calculated ejection fraction is 61%. · There was no convincing evidence of significant reversible defect to suggest on going major ischemia. No significant fixed defect. · Myocardial perfusion imaging supports a low risk stress test.    CATHETERIZATION    IMPRESSION & PLAN:  Ms. Macy Cadet is 25-year-old female    SVT/narrow complex tachycardia  Diagnosed in July 2020 by EKG.   Heart rate at the time was 200 bpm.  Responsive to adenosine to sinus rhythm  On exam today appears to be sinus rhythm  Denies any symptoms at this time  Continue Toprol-XL 25 mg daily    Hypertension:  No prior history. Blood pressure elevated today however somewhat anxious. Repeat blood pressure was 136/62. Continue to monitor    Importance of diet and exercise was discussed with patient. This plan was discussed with patient who is in agreement. Thank you for allowing me to participate in patient care. Please feel free to call me if you have any question or concern. Merian Apley, MD  Please note: This document has been produced using voice recognition software. Unrecognized errors in transcription may be present.

## 2020-08-31 RX ORDER — METOPROLOL SUCCINATE 25 MG/1
25 TABLET, EXTENDED RELEASE ORAL DAILY
Qty: 90 TAB | Refills: 3 | Status: SHIPPED | OUTPATIENT
Start: 2020-08-31 | End: 2020-09-03 | Stop reason: SDUPTHER

## 2020-08-31 NOTE — TELEPHONE ENCOUNTER
PCP: Charan Hung MD    Last appt: 8/19/2020  No future appointments. Requested Prescriptions     Pending Prescriptions Disp Refills    metoprolol succinate (TOPROL-XL) 25 mg XL tablet 90 Tab 3     Sig: Take 1 Tab by mouth daily.

## 2020-09-03 NOTE — TELEPHONE ENCOUNTER
Pt requesting 30 day refill be sent to [facility]. She is out of medication because refill in Aug was sent to [facility].

## 2020-09-04 RX ORDER — METOPROLOL SUCCINATE 25 MG/1
25 TABLET, EXTENDED RELEASE ORAL DAILY
Qty: 30 TAB | Refills: 1 | Status: SHIPPED | OUTPATIENT
Start: 2020-09-04 | End: 2020-09-08

## 2020-09-08 RX ORDER — METOPROLOL SUCCINATE 25 MG/1
TABLET, EXTENDED RELEASE ORAL
Qty: 90 TAB | Refills: 3 | Status: SHIPPED | OUTPATIENT
Start: 2020-09-08 | End: 2021-08-17 | Stop reason: SDUPTHER

## 2020-10-30 ENCOUNTER — IMMUNIZATION CLINIC (OUTPATIENT)
Dept: FAMILY MEDICINE CLINIC | Age: 70
End: 2020-10-30
Payer: MEDICARE

## 2020-10-30 DIAGNOSIS — Z23 ENCOUNTER FOR IMMUNIZATION: Primary | ICD-10-CM

## 2020-10-30 PROCEDURE — 90694 VACC AIIV4 NO PRSRV 0.5ML IM: CPT | Performed by: FAMILY MEDICINE

## 2020-10-30 PROCEDURE — G0008 ADMIN INFLUENZA VIRUS VAC: HCPCS | Performed by: FAMILY MEDICINE

## 2020-10-30 NOTE — PROGRESS NOTES
Room #  Lab    Chief Complaint:    Flu shot  HPI:    Gregory Salmeron is a 65 y.o. female who presents today for c/o Flu shot    1. Have you been to the ER, urgent care clinic since your last visit? Hospitalized since your last visit? NO When:    2. Have you seen or consulted any other health care providers outside of the [address] [place] [address] since your last visit? Include any pap smears or colon screening. NO  When :  Reason:    Last  Checked na  Last UDS Checked na  Last Pain contract signed: na    Consent:  She and/or health care decision maker is aware that that she may receive a bill for this telephone service, depending on her insurance coverage, and has provided verbal consent to proceed: NA - Consent obtained within past 12 months      Health Maintenance reviewed Yes    Health Maintenance Due   Topic Date Due    Shingrix Vaccine Age 49> (1 of 2) 04/08/1998    Colorectal Cancer Screening Combo  01/13/2019    Breast Cancer Screen Mammogram  02/08/2019    GLAUCOMA SCREENING Q2Y  03/30/2020    Flu Vaccine (1) 09/01/2020     Depression Screening:  3 most recent PHQ Screens 10/30/2020   PHQ Not Done -   Little interest or pleasure in doing things Not at all   Feeling down, depressed, irritable, or hopeless Not at all   Total Score PHQ 2 0     Learning Assessment:  Learning Assessment 4/2/2018   PRIMARY LEARNER Patient   PRIMARY LANGUAGE ENGLISH   LEARNER PREFERENCE PRIMARY DEMONSTRATION     -     -   ANSWERED BY patient   RELATIONSHIP SELF     Abuse Screening:  Abuse Screening Questionnaire 4/2/2018   Do you ever feel afraid of your partner? N   Are you in a relationship with someone who physically or mentally threatens you? N   Is it safe for you to go home? Y     Fall Risk  Fall Risk Assessment, last 12 mths 4/20/2020   Able to walk? Yes   Fall in past 12 months?  No

## 2021-03-23 ENCOUNTER — TELEPHONE (OUTPATIENT)
Dept: FAMILY MEDICINE CLINIC | Age: 71
End: 2021-03-23

## 2021-03-23 DIAGNOSIS — Z12.31 BREAST CANCER SCREENING BY MAMMOGRAM: Primary | ICD-10-CM

## 2021-03-23 DIAGNOSIS — Z12.31 BREAST CANCER SCREENING BY MAMMOGRAM: ICD-10-CM

## 2021-03-23 NOTE — TELEPHONE ENCOUNTER
Patient is requesting an order for mammogram. Pt was last seen 8/11/2020 and no upcoming appointment. Asking to be called back.

## 2021-05-03 ENCOUNTER — HOSPITAL ENCOUNTER (OUTPATIENT)
Dept: WOMENS IMAGING | Age: 71
Discharge: HOME | End: 2021-05-03
Attending: FAMILY MEDICINE
Payer: MEDICARE

## 2021-05-03 DIAGNOSIS — Z12.31 BREAST CANCER SCREENING BY MAMMOGRAM: ICD-10-CM

## 2021-05-03 PROCEDURE — 77063 BREAST TOMOSYNTHESIS BI: CPT

## 2021-08-17 RX ORDER — METOPROLOL SUCCINATE 25 MG/1
25 TABLET, EXTENDED RELEASE ORAL DAILY
Qty: 90 TABLET | Refills: 0 | Status: SHIPPED | OUTPATIENT
Start: 2021-08-17 | End: 2021-09-03 | Stop reason: ALTCHOICE

## 2021-08-17 NOTE — TELEPHONE ENCOUNTER
PCP: Funmilayo Kelley MD    Last appt: Visit date not found  Future Appointments   Date Time Provider Mirian Hou   9/3/2021  1:00 PM Janet Prater MD [facility]       Requested Prescriptions     Pending Prescriptions Disp Refills    metoprolol succinate (TOPROL-XL) 25 mg XL tablet 90 Tablet 0     Sig: Take 1 Tablet by mouth daily.

## 2021-08-17 NOTE — TELEPHONE ENCOUNTER
Requested Prescriptions     Pending Prescriptions Disp Refills    metoprolol succinate (TOPROL-XL) 25 mg XL tablet 90 Tablet 3     Sig: Take 1 Tablet by mouth daily.      Patient has appointment 9/3/21

## 2021-09-03 ENCOUNTER — OFFICE VISIT (OUTPATIENT)
Dept: CARDIOLOGY CLINIC | Age: 71
End: 2021-09-03
Payer: MEDICARE

## 2021-09-03 VITALS
DIASTOLIC BLOOD PRESSURE: 74 MMHG | HEIGHT: 63 IN | BODY MASS INDEX: 21.62 KG/M2 | WEIGHT: 122 LBS | TEMPERATURE: 97.3 F | OXYGEN SATURATION: 99 % | HEART RATE: 70 BPM | SYSTOLIC BLOOD PRESSURE: 138 MMHG

## 2021-09-03 DIAGNOSIS — I47.1 SVT (SUPRAVENTRICULAR TACHYCARDIA) (HCC): Primary | ICD-10-CM

## 2021-09-03 PROCEDURE — G8428 CUR MEDS NOT DOCUMENT: HCPCS | Performed by: INTERNAL MEDICINE

## 2021-09-03 PROCEDURE — G8510 SCR DEP NEG, NO PLAN REQD: HCPCS | Performed by: INTERNAL MEDICINE

## 2021-09-03 PROCEDURE — 99214 OFFICE O/P EST MOD 30 MIN: CPT | Performed by: INTERNAL MEDICINE

## 2021-09-03 PROCEDURE — G8536 NO DOC ELDER MAL SCRN: HCPCS | Performed by: INTERNAL MEDICINE

## 2021-09-03 PROCEDURE — 1090F PRES/ABSN URINE INCON ASSESS: CPT | Performed by: INTERNAL MEDICINE

## 2021-09-03 PROCEDURE — 3017F COLORECTAL CA SCREEN DOC REV: CPT | Performed by: INTERNAL MEDICINE

## 2021-09-03 PROCEDURE — G8420 CALC BMI NORM PARAMETERS: HCPCS | Performed by: INTERNAL MEDICINE

## 2021-09-03 PROCEDURE — G9899 SCRN MAM PERF RSLTS DOC: HCPCS | Performed by: INTERNAL MEDICINE

## 2021-09-03 PROCEDURE — G8399 PT W/DXA RESULTS DOCUMENT: HCPCS | Performed by: INTERNAL MEDICINE

## 2021-09-03 PROCEDURE — 1101F PT FALLS ASSESS-DOCD LE1/YR: CPT | Performed by: INTERNAL MEDICINE

## 2021-09-03 PROCEDURE — 93000 ELECTROCARDIOGRAM COMPLETE: CPT | Performed by: INTERNAL MEDICINE

## 2021-09-03 RX ORDER — CARVEDILOL 6.25 MG/1
6.25 TABLET ORAL 2 TIMES DAILY WITH MEALS
Qty: 180 TABLET | Refills: 3 | Status: SHIPPED | OUTPATIENT
Start: 2021-09-03 | End: 2021-12-17 | Stop reason: SDUPTHER

## 2021-09-03 NOTE — PROGRESS NOTES
[facility]    Ms. Joseph Morgan is 66 y.o. female with a history of SVT, ovarian cancer    Patient is here today for follow-up appointment. Denies any hospital mission or ER visits since last time for cardiac reason. Denies any excessive palpitation. Denies presyncope or syncope. Denies any symptoms concerning for angina or heart failure  Denies any nausea, vomiting, abdominal pain, fever, chills, sputum production. No hematuria or other bleeding complaints    Past Medical History:   Diagnosis Date    Alopecia 1/13/2011    Arthritis     Halitosis 1/13/2011    Menopause     Ovarian cancer ([facility] [address]     02/1998    Restless leg syndrome     SVT [facility]supraventricular tachycardia) ([facility])          Past Surgical History:   Procedure Laterality Date    HX CATARACT REMOVAL Right     HX GYN      HX HYSTERECTOMY         Current Outpatient Medications   Medication Sig    metoprolol succinate (TOPROL-XL) 25 mg XL tablet Take 1 Tablet by mouth daily.  diclofenac (VOLTAREN) 1 % gel Apply 2 g to affected area four (4) times daily. For hand pain    dextran 70-hypromellose (ARTIFICIAL TEARS,[id number],) ophthalmic solution Administer 1 Drop to both eyes as needed.  fexofenadine-pseudoephedrine (ALLEGRA-D 12 HOUR)  mg Tb12 Take 1 Tab by mouth every twelve (12) hours.  polyethylene glycol (MIRALAX) 17 gram packet Take 1 Packet by mouth daily. No current facility-administered medications for this visit. Allergies and Sensitivities:  No Known Allergies    Family History:  Family History   Problem Relation Age of Onset    Heart Disease Mother        Social History:  Social History     Tobacco Use    Smoking status: Never Smoker    Smokeless tobacco: Never Used   Substance Use Topics    Alcohol use: No    Drug use: No     She  reports that she has never smoked.  She has never used smokeless tobacco.  She  reports no history of alcohol use.    Review of Systems:  Cardiac symptoms as noted above in HPI. All others negative. Physical Exam:  BP Readings from Last 3 Encounters:   09/03/21 (!) 150/68   08/19/20 156/64   07/29/20 162/77         Pulse Readings from Last 3 Encounters:   09/03/21 69   08/19/20 68   07/29/20 69          Wt Readings from Last 3 Encounters:   09/03/21 55.3 kg (122 lb)   08/19/20 53 kg (116 lb 12.8 oz)   07/29/20 51.7 kg (114 lb)       Constitutional: Oriented to person, place, and time. HENT: Head: Normocephalic and atraumatic. Neck: No JVD present. Carotid bruit is not appreciated. Cardiovascular: Regular rhythm. No murmur, gallop or rubs appreciated  Lung: Breath sounds normal. No respiratory distress. No ronchi or rales appreciated  Abdominal: No tenderness. No rebound and no guarding. Musculoskeletal: There is no lower extremity edema. No cynosis      Review of Data  LABS:   Lab Results   Component Value Date/Time    Sodium 137 07/26/2020 02:15 AM    Potassium 3.8 07/26/2020 02:15 AM    Chloride 105 07/26/2020 02:15 AM    CO2 27 07/26/2020 02:15 AM    Glucose 80 07/26/2020 02:15 AM    BUN 11 07/26/2020 02:15 AM    Creatinine 0.62 07/26/2020 02:15 AM     Lipids Latest Ref Rng & Units 7/26/2020 5/31/2018   Chol, Total <200 MG/ 182   HDL 40 - 60 MG/DL 84(H) 78   LDL 0 - 100 MG/(H) 95   Trig <150 MG/DL 60 45   Chol/HDL Ratio 0 - 5.0   2.3 -   Some recent data might be hidden     Lab Results   Component Value Date/Time    ALT (SGPT) 19 07/26/2020 02:15 AM     Lab Results   Component Value Date/Time    Hemoglobin A1c 5.1 11/24/2015 03:48 PM       EKG    ECHO(07/20)  ft Ventricle  Normal cavity size, wall thickness and systolic function (ejection fraction normal). The estimated ejection fraction is 55 - 60%. Visually measured ejection fraction. There is inconclusive left ventricular diastolic function. Elevated left ventricular diastolic pressure.     Wall Scoring  The left ventricular wall motion is normal.             Left Atrium  Normal cavity size. Right Ventricle  Normal cavity size and global systolic function. Right Atrium  Normal cavity size. Aortic Valve  Normal valve structure and no stenosis. Trace aortic valve regurgitation. Mitral Valve  Normal valve structure and no stenosis. Trace regurgitation. Tricuspid Valve  Normal valve structure and no stenosis. Mild regurgitation. Pulmonic Valve  Pulmonic valve not well visualized. No stenosis. Trace regurgitation. Pulmonary Artery  Pulmonary arteries not assessed and pulmonary arteries not well visualized. Pulmonary arterial systolic pressure (PASP) is 22 mmHg. Pulmonary hypertension not suggested by Doppler findings. STRESS TEST (07/20)  · Baseline ECG: Normal EKG, non-specific ST-T wave abnormalities. · Gated SPECT: Left ventricular function post-stress was normal. Calculated ejection fraction is 61%. · There was no convincing evidence of significant reversible defect to suggest on going major ischemia. No significant fixed defect. · Myocardial perfusion imaging supports a low risk stress test.    CATHETERIZATION    IMPRESSION & PLAN:  Ms. Juanjo Hoover is 66 y.o. female    SVT/narrow complex tachycardia  Diagnosed in July 2020 by EKG. Heart rate at the time was 200 bpm.  Responsive to adenosine to sinus rhythm  On exam today appears to be sinus rhythm  Denies any symptoms at this time  We will continue beta-blocker    Hypertension:  /68. Will discontinue Toprol and use nonselective Coreg 6.25 mg twice daily for blood pressure control. Have advised patient to salt restriction. Keep checking blood pressure at home over next 2 weeks and bring diary in our clinic for blood pressure check in 2 weeks. Importance of diet and exercise was discussed with patient. This plan was discussed with patient who is in agreement. Thank you for allowing me to participate in patient care.  Please feel free to call me if you have any question or concern. Crescencio Kimbrough MD  Please note: This document has been produced using voice recognition software. Unrecognized errors in transcription may be present.

## 2021-09-03 NOTE — PROGRESS NOTES
Du Garcia presents today for   Chief Complaint   Patient presents with    Follow-up     1 year       Du Garcia preferred language for health care discussion is english/other. Personal Protective Equipment:   Personal Protective Equipment was used including: mask-surgical and hands-gloves. Patient was placed on no precaution(s). Patient was masked. Precautions:   Patient currently on None  Patient currently roomed with door closed    Is someone accompanying this pt? no    Is the patient using any DME equipment during [address]? no    Depression Screening:  3 most recent PHQ Screens 10/30/2020   PHQ Not Done -   Little interest or pleasure in doing things Not at all   Feeling down, depressed, irritable, or hopeless Not at all   Total Score PHQ 2 0       Learning Assessment:  Learning Assessment 4/2/2018   PRIMARY LEARNER Patient   PRIMARY LANGUAGE ENGLISH   LEARNER PREFERENCE PRIMARY DEMONSTRATION     -     -   ANSWERED BY patient   RELATIONSHIP SELF       Abuse Screening:  Abuse Screening Questionnaire 4/2/2018   Do you ever feel afraid of your partner? N   Are you in a relationship with someone who physically or mentally threatens you? N   Is it safe for you to go home? Y       Fall Risk  Fall Risk Assessment, last 12 mths 4/20/2020   Able to walk? Yes   Fall in past 12 months? No       Pt currently taking Anticoagulant therapy? no    Coordination of Care:  1. Have you been to the ER, urgent care clinic since your last visit? Hospitalized since your last visit? no    2. Have you seen or consulted any other health care providers outside of the [address] [place] [address] since your last visit? Include any pap smears or colon screening.  no

## 2021-09-03 NOTE — PATIENT INSTRUCTIONS
New Medication/Medication Changes  Stop Toprol   Start Coreg 6.25 mg twice a day    **please allow 24-48 hrs for medication to be escribed to pharmacy** If you need any refills on medications please contact your pharmacy so that the request can be escribed to the provider for review.

## 2021-11-01 ENCOUNTER — CLINICAL SUPPORT (OUTPATIENT)
Dept: CARDIOLOGY CLINIC | Age: 71
End: 2021-11-01

## 2021-11-01 VITALS — DIASTOLIC BLOOD PRESSURE: 61 MMHG | HEART RATE: 63 BPM | SYSTOLIC BLOOD PRESSURE: 125 MMHG

## 2021-11-01 DIAGNOSIS — Z01.30 BLOOD PRESSURE CHECK: Primary | ICD-10-CM

## 2021-11-01 NOTE — PROGRESS NOTES
Nichole Bravo was seen in our office today for BP evaluation. Listed below are the patients current meds:      Current Outpatient Medications:     carvediloL (COREG) 6.25 mg tablet, Take 1 Tablet by mouth two (2) times daily (with meals). , Disp: 180 Tablet, Rfl: 3    diclofenac (VOLTAREN) 1 % gel, Apply 2 g to affected area four (4) times daily. For hand pain, Disp: 100 g, Rfl: 12    dextran 70-hypromellose (ARTIFICIAL TEARS,[id number],) ophthalmic solution, Administer 1 Drop to both eyes as needed. , Disp: , Rfl:     fexofenadine-pseudoephedrine (ALLEGRA-D 12 HOUR)  mg Tb12, Take 1 Tab by mouth every twelve (12) hours. , Disp: , Rfl:     polyethylene glycol (MIRALAX) 17 gram packet, Take 1 Packet by mouth daily. , Disp: 100 Each, Rfl: 12      No current facility-administered medications for this visit.     Visit Vitals  /61   Pulse 63

## 2021-11-29 DIAGNOSIS — M15.9 PRIMARY OSTEOARTHRITIS INVOLVING MULTIPLE JOINTS: ICD-10-CM

## 2021-11-29 NOTE — TELEPHONE ENCOUNTER
Patient called requesting for medication [facility], please advise    Requested Prescriptions     Pending Prescriptions Disp Refills    diclofenac (VOLTAREN) 1 % gel 100 g 12     Sig: Apply 2 g to affected area four (4) times daily.  For hand pain

## 2021-11-30 RX ORDER — DICLOFENAC SODIUM 10 MG/G
2 GEL TOPICAL 4 TIMES DAILY
Qty: 100 G | Refills: 12 | Status: SHIPPED | OUTPATIENT
Start: 2021-11-30

## 2021-12-17 NOTE — TELEPHONE ENCOUNTER
Requested Prescriptions     Pending Prescriptions Disp Refills    carvediloL (COREG) 6.25 mg tablet       Sig: Take 1 Tablet by mouth two (2) times daily (with meals). Patient is out of medication. Requests local pharmacy 7 day fill. Patient is very concerned.

## 2021-12-20 RX ORDER — CARVEDILOL 6.25 MG/1
6.25 TABLET ORAL 2 TIMES DAILY WITH MEALS
Qty: 30 TABLET | Refills: 3 | Status: SHIPPED | OUTPATIENT
Start: 2021-12-20 | End: 2021-12-27

## 2021-12-27 RX ORDER — CARVEDILOL 6.25 MG/1
TABLET ORAL
Qty: 180 TABLET | Refills: 3 | Status: SHIPPED | OUTPATIENT
Start: 2021-12-27 | End: 2022-08-23

## 2022-01-17 ENCOUNTER — TELEPHONE (OUTPATIENT)
Dept: CARDIOLOGY CLINIC | Age: 72
End: 2022-01-17

## 2022-01-17 NOTE — TELEPHONE ENCOUNTER
Is taking meloxicam and wants to know what to do regarding how it effects her heart medication. Patient says that is makes her BP elevated. Wanted to know if there is a specific time to take either medication. She also isn't entirely sure if that is entirely the reason why her BP is elevated.

## 2022-01-18 NOTE — TELEPHONE ENCOUNTER
Contacted pt at [facility] number. Two patient Identifiers confirmed. Advised pt per Dr Jessy Toledo. Pt stated sh is not sleeping well, in pain from her foot and under stress. Advised pt all those factors and elevate Bp as well. Pt verbalized understanding.

## 2022-01-18 NOTE — TELEPHONE ENCOUNTER
Verbal order and read back per Patrick Moore MD  No Contraindications to cardiac med; will need to contact prescribing provider to see if she can get alternate medication

## 2022-02-03 ENCOUNTER — OFFICE VISIT (OUTPATIENT)
Dept: FAMILY MEDICINE CLINIC | Age: 72
End: 2022-02-03
Payer: MEDICARE

## 2022-02-03 VITALS
RESPIRATION RATE: 17 BRPM | BODY MASS INDEX: 21.23 KG/M2 | OXYGEN SATURATION: 97 % | HEIGHT: 63 IN | SYSTOLIC BLOOD PRESSURE: 125 MMHG | HEART RATE: 79 BPM | WEIGHT: 119.8 LBS | DIASTOLIC BLOOD PRESSURE: 60 MMHG | TEMPERATURE: 97.9 F

## 2022-02-03 DIAGNOSIS — Z63.8 STRESS DUE TO FAMILY TENSION: ICD-10-CM

## 2022-02-03 DIAGNOSIS — R35.0 URINE FREQUENCY: Primary | ICD-10-CM

## 2022-02-03 DIAGNOSIS — I47.1 SVT (SUPRAVENTRICULAR TACHYCARDIA) (HCC): ICD-10-CM

## 2022-02-03 DIAGNOSIS — Z71.89 ADVANCE CARE PLANNING: ICD-10-CM

## 2022-02-03 DIAGNOSIS — Z23 NEEDS FLU SHOT: ICD-10-CM

## 2022-02-03 DIAGNOSIS — M15.9 PRIMARY OSTEOARTHRITIS INVOLVING MULTIPLE JOINTS: ICD-10-CM

## 2022-02-03 DIAGNOSIS — C56.9 MALIGNANT NEOPLASM OF OVARY, UNSPECIFIED LATERALITY (HCC): ICD-10-CM

## 2022-02-03 DIAGNOSIS — Z82.49 FAMILY HISTORY OF MI (MYOCARDIAL INFARCTION): ICD-10-CM

## 2022-02-03 DIAGNOSIS — Z13.6 ENCOUNTER FOR LIPID SCREENING FOR CARDIOVASCULAR DISEASE: ICD-10-CM

## 2022-02-03 DIAGNOSIS — Z13.220 ENCOUNTER FOR LIPID SCREENING FOR CARDIOVASCULAR DISEASE: ICD-10-CM

## 2022-02-03 DIAGNOSIS — Z00.00 MEDICARE ANNUAL WELLNESS VISIT, SUBSEQUENT: ICD-10-CM

## 2022-02-03 PROCEDURE — 81001 URINALYSIS AUTO W/SCOPE: CPT | Performed by: FAMILY MEDICINE

## 2022-02-03 PROCEDURE — G8432 DEP SCR NOT DOC, RNG: HCPCS | Performed by: FAMILY MEDICINE

## 2022-02-03 PROCEDURE — G8399 PT W/DXA RESULTS DOCUMENT: HCPCS | Performed by: FAMILY MEDICINE

## 2022-02-03 PROCEDURE — G8420 CALC BMI NORM PARAMETERS: HCPCS | Performed by: FAMILY MEDICINE

## 2022-02-03 PROCEDURE — G9899 SCRN MAM PERF RSLTS DOC: HCPCS | Performed by: FAMILY MEDICINE

## 2022-02-03 PROCEDURE — 90694 VACC AIIV4 NO PRSRV 0.5ML IM: CPT | Performed by: FAMILY MEDICINE

## 2022-02-03 PROCEDURE — 1101F PT FALLS ASSESS-DOCD LE1/YR: CPT | Performed by: FAMILY MEDICINE

## 2022-02-03 PROCEDURE — 3017F COLORECTAL CA SCREEN DOC REV: CPT | Performed by: FAMILY MEDICINE

## 2022-02-03 PROCEDURE — G0439 PPPS, SUBSEQ VISIT: HCPCS | Performed by: FAMILY MEDICINE

## 2022-02-03 PROCEDURE — 93000 ELECTROCARDIOGRAM COMPLETE: CPT | Performed by: FAMILY MEDICINE

## 2022-02-03 PROCEDURE — G8536 NO DOC ELDER MAL SCRN: HCPCS | Performed by: FAMILY MEDICINE

## 2022-02-03 PROCEDURE — 99214 OFFICE O/P EST MOD 30 MIN: CPT | Performed by: FAMILY MEDICINE

## 2022-02-03 PROCEDURE — G0008 ADMIN INFLUENZA VIRUS VAC: HCPCS | Performed by: FAMILY MEDICINE

## 2022-02-03 PROCEDURE — G8427 DOCREV CUR MEDS BY ELIG CLIN: HCPCS | Performed by: FAMILY MEDICINE

## 2022-02-03 PROCEDURE — 1090F PRES/ABSN URINE INCON ASSESS: CPT | Performed by: FAMILY MEDICINE

## 2022-02-03 RX ORDER — NITROFURANTOIN 25; 75 MG/1; MG/1
100 CAPSULE ORAL 2 TIMES DAILY
Qty: 14 CAPSULE | Refills: 0 | Status: ON HOLD | OUTPATIENT
Start: 2022-02-03 | End: 2022-04-29

## 2022-02-03 SDOH — SOCIAL STABILITY - SOCIAL INSECURITY: OTHER SPECIFIED PROBLEMS RELATED TO PRIMARY SUPPORT GROUP: Z63.8

## 2022-02-03 NOTE — PROGRESS NOTES
Meghan Mata is a 66 y.o. female (: 1950) presenting to address:    Chief Complaint   Patient presents with    UTI    Rapid Heart Rate       There were no vitals filed for this visit. Hearing/Vision:   No exam data present    Learning Assessment:     Learning Assessment 2018   PRIMARY LEARNER Patient   PRIMARY LANGUAGE ENGLISH   LEARNER PREFERENCE PRIMARY DEMONSTRATION     -     -   ANSWERED BY patient   RELATIONSHIP SELF     Depression Screening:     3 most recent PHQ Screens 2/3/2022   PHQ Not Done -   Little interest or pleasure in doing things Not at all   Feeling down, depressed, irritable, or hopeless Not at all   Total Score PHQ 2 0     Fall Risk Assessment:     Fall Risk Assessment, last 12 mths 2/3/2022   Able to walk? Yes   Fall in past 12 months? 0   Do you feel unsteady? 0   Are you worried about falling 0     Abuse Screening:     Abuse Screening Questionnaire 2018   Do you ever feel afraid of your partner? N   Are you in a relationship with someone who physically or mentally threatens you? N   Is it safe for you to go home? Y     ADL Assessment:     ADL Assessment 2018   Feeding yourself No Help Needed   Getting from bed to chair No Help Needed   Getting dressed No Help Needed   Bathing or showering No Help Needed   Walk across the room (includes cane/walker) No Help Needed   Using the telphone No Help Needed   Taking your medications No Help Needed   Preparing meals No Help Needed   Managing money (expenses/bills) No Help Needed   Moderately strenuous housework (laundry) No Help Needed   Shopping for personal items (toiletries/medicines) No Help Needed   Shopping for groceries No Help Needed   Driving No Help Needed   Climbing a flight of stairs No Help Needed   Getting to places beyond walking distances No Help Needed        Coordination of Care Questionaire:   1. Have you been to the ER, urgent care clinic since your last visit? Hospitalized since your last visit? NO    2. Have you seen or consulted any other health care providers outside of the [address] [place] [address] since your last visit? Include any pap smears or colon screening.  NO

## 2022-02-03 NOTE — PROGRESS NOTES
(AWV) The Medicare Annual Wellness Exam PROGRESS NOTE    This is a Medicare Annual Wellness Exam (AWV)     I have reviewed the patient's medical history in detail and updated the computerized patient record. Petty Alfaro is a 66 y.o.  female and presents for an annual wellness exam     ROS   General ROS: negative for - chills or fever; some fatigue with medication  Psychological ROS: negative for - anxiety or depression  Ophthalmic ROS: positive for - uses glasses  ENT ROS: negative for - headaches;  She does have allergies so occasionally has nasal congestion. Endocrine ROS: negative for - polydipsia/polyuria or temperature intolerance  Respiratory ROS: no cough, shortness of breath, or wheezing  Cardiovascular ROS: positive for - dyspnea on exertion; no chest pain  Gastrointestinal ROS: no abdominal pain, change in bowel habits, or black or bloody stools  Genito-Urinary ROS: no dysuria, trouble voiding, or hematuria  Musculoskeletal ROS: negative for - joint pain or muscle pain  Neurological ROS: no TIA or stroke symptoms  Dermatological ROS: negative for - rash or skin lesion changes     All other systems reviewed and are negative. History     Past Medical History:   Diagnosis Date    Alopecia 1/13/2011    Arthritis     Halitosis 1/13/2011    Menopause     Ovarian cancer ([facility] [address]     02/1998    Restless leg syndrome     [facility]       Past Surgical History:   Procedure Laterality Date    HX CATARACT REMOVAL Right     HX GYN      HX HYSTERECTOMY       Current Outpatient Medications   Medication Sig Dispense Refill    carvediloL (COREG) 6.25 mg tablet TAKE 1 TABLET BY MOUTH TWICE DAILY WITH MEALS 180 Tablet 3    diclofenac (VOLTAREN) 1 % gel Apply 2 g to affected area four (4) times daily. For hand pain 100 g 12    dextran 70-hypromellose (ARTIFICIAL TEARS,[id number],) ophthalmic solution Administer 1 Drop to both eyes as needed.       fexofenadine-pseudoephedrine (ALLEGRA-D 12 HOUR)  mg Tb12 Take 1 Tab by mouth every twelve (12) hours.  polyethylene glycol (MIRALAX) 17 gram packet Take 1 Packet by mouth daily. 100 Each 12     No Known Allergies  Family History   Problem Relation Age of Onset    Heart Disease Mother      Social History     Tobacco Use    Smoking status: Never Smoker    Smokeless tobacco: Never Used   Substance Use Topics    Alcohol use: No     Patient Active Problem List   Diagnosis Code    Ovarian cancer ([facility] [address] C56.9    Alopecia L65.9    Primary osteoarthritis involving multiple joints M89.49    SVT (supraventricular tachycardia) (HCC) I47.1       Health Maintenance History  Immunizations reviewed, dtap up to date , pneumovax up to date, flu due, zoster up to date  Colonoscopy: due,   Eye exam: up to date  Mammo up to date  Dexascan up to date      Depression Risk Factor Screening:      Patient Health Questionnaire (PHQ-2)   Over the last 2 weeks, how often have you been bothered by any of the following problems? · Little interest or pleasure in doing things? · Not at all. [0]  · Feeling down, depressed, or hopeless? · Several days. [1]    Total Score: 1/6  PHQ-2 Assessment Scoring:   A score of 2 or more requires further screening with the PHQ-9    Alcohol Risk Factor Screening:     Women: On any occasion during the past 3 months, have you had more than 3 drinks containing alcohol? no   Do you average more than 7 drinks per week? no    Functional Ability and Level of Safety:     Hearing Loss    Hearing is good. Activities of Daily Living   Self-care.    Requires assistance with: no ADLs    Fall Risk   Secondary diagnoses (15 pts)  Score: 15    Abuse Screen   Patient is not abused    Examination   Physical Examination  Vitals:    02/03/22 0959   BP: 125/60   Pulse: 79   Resp: 17   Temp: 97.9 °F (36.6 °C)   TempSrc: Temporal   SpO2: 97%   Weight: 119 lb 12.8 oz (54.3 kg)   Height: 5' 3\" (1.6 m)      Body mass index is 21.22 kg/m². Evaluation of Cognitive Function:  Mood/affect: good mood with appropriate affect  Appearance: well kempt  Family member/caregiver input:  is worried about her     alert, well appearing, and in no distress, oriented to person, place, and time and normal appearing weight    Patient Care Team:  Macy Ocampo MD as PCP - General (Family Medicine)  Macy Ocampo MD as PCP - [facility] Empaneled Provider  Loetta Baumgarten, MD (Ophthalmology)    End-of-life planning  Advanced Directive in the case than an injury or illness causes the patient to be unable to make health care decisions    Health Care Directive or Living Will: yes    Advice/Referrals/Counselling/Plan:   Education and counseling provided:  End-of-Life planning (with patient's consent)  Influenza Vaccine  Diabetes screening test  Include in education list (weight loss, physical activity, smoking cessation, fall prevention, and nutrition)    ICD-10-CM ICD-9-CM    1. Urine frequency  R35.0 788.41 AMB POC URINALYSIS DIP STICK AUTO W/ MICRO      nitrofurantoin, macrocrystal-monohydrate, (Macrobid) 100 mg capsule   2. Malignant neoplasm of ovary, unspecified laterality (HCC)  C56.9 183.0    3. SVT (supraventricular tachycardia) (HCC)  I47.1 427.89 AMB POC EKG ROUTINE W/ 12 LEADS, INTER & REP   4. Medicare annual wellness visit, subsequent  Z00.00 V70.0    5. Advance care planning  Z71.89 V65.49    6. Needs flu shot  Z23 V04.81 INFLUENZA VIRUS VACCINE, HIGH DOSE SEASONAL, PRESERVATIVE FREE      ADMIN INFLUENZA VIRUS VAC   7. Stress due to family tension  Z63.8 V61.8    8. Primary osteoarthritis involving multiple joints  M89.49 715.98    9. Family history of MI (myocardial infarction)  Z82.49 V17.3 LIPID PANEL   10. Encounter for lipid screening for cardiovascular disease  Z13.220 V77.91 LIPID PANEL    Z13.6 V81.2    .   Brief written plan, checklist    I have discussed the diagnosis with the patient and the intended plan as seen in the above orders. The patient has received an after-visit summary and questions were answered concerning future plans. I have discussed medication side effects and warnings with the patient as well. I have reviewed the plan of care with the patient, accepted their input and they are in agreement with the treatment goals. ____________________________________________________________    Problem Assessment    for treatment of   Chief Complaint   Patient presents with    UTI    Rapid Heart Rate         SUBJECTIVE    Well Adult Physical   Patient here for a comprehensive physical exam.The patient reports problems - she c/o 4 days of urinary frequency  She had episode yesterday of heart rate >200; she called the paramedics and they assessed her and offered to take her to the ER but she declined. She had rapid improvement; she is taking carvedilol for blood pressure. She has history of SVT. She has arthritis and has been prescribed meloxicam.  This has been effective for left ankle pain and left knee pain. Do you take any herbs or supplements that were not prescribed by a doctor? no Are you taking calcium supplements? no Are you taking aspirin daily? not applicable    Visit Vitals  /60 (BP 1 Location: Left upper arm, BP Patient Position: Sitting, BP Cuff Size: Adult)   Pulse 79   Temp 97.9 °F (36.6 °C) (Temporal)   Resp 17   Ht 5' 3\" (1.6 m)   Wt 119 lb 12.8 oz (54.3 kg)   SpO2 97%   BMI 21.22 kg/m²     General:  Alert, cooperative, no distress, appears stated age. Head:  Normocephalic, without obvious abnormality, atraumatic. Eyes:  Conjunctivae/corneas clear. PERRL, EOMs intact. Ears:  Normal TMs and external ear canals both ears. Nose: Nares normal. Septum midline. Mucosa normal. No drainage or sinus tenderness.    Throat: Lips, mucosa, and tongue normal. Teeth and gums normal.   Neck: Supple, symmetrical, trachea midline, no adenopathy, thyroid: no enlargement/tenderness/nodules and no JVD. Back:   Symmetric, no curvature. ROM normal. No CVA tenderness. Lungs:   Clear to auscultation bilaterally. Chest wall:  No tenderness or deformity. Heart:  Regular rate and rhythm, S1, S2 normal, no murmur, click, rub or gallop. Breast Exam:  Not examined   Abdomen:   Soft, non-tender. Bowel sounds normal. No masses,  No organomegaly. Genitalia:  Deferred    Rectal:  deferred   Extremities: Extremities normal, atraumatic, no cyanosis or edema. Pulses: 2+ and symmetric all extremities. Skin: Skin color, texture, turgor normal. No rashes or lesions. Lymph nodes: Cervical, supraclavicular, and axillary nodes normal.   Neurologic: CNII-XII intact. Normal strength, sensation and reflexes throughout. LABS     TESTS    Assessment/Plan:    1. Urine frequency  Start abx  - AMB POC URINALYSIS DIP STICK AUTO W/ MICRO    2. Malignant neoplasm of ovary, unspecified laterality ([facility] [address]  Followed by gyn oncologist; she is 24 years s/p surgery; she is in remission    3. SVT (supraventricular tachycardia) ([facility] [address]  Continue to monitor; f/u with cardiology    4. Medicare annual wellness visit, subsequent  Reviewed preventive recommendations    5. Advance care planning  See ACP    6. Needs flu shot    - INFLUENZA VIRUS VACCINE, HIGH DOSE SEASONAL, PRESERVATIVE FREE  - ADMIN INFLUENZA VIRUS VAC    7. Stress due to family tension  Family support from     6.  Primary osteoarthritis involving multiple joints  F/u with rheumatology and continue current management      Lab review: orders written for new lab studies as appropriate; see orders

## 2022-02-16 ENCOUNTER — TELEPHONE (OUTPATIENT)
Dept: CARDIOLOGY CLINIC | Age: 72
End: 2022-02-16

## 2022-02-16 NOTE — TELEPHONE ENCOUNTER
BP has been up and down and patient is concerned.  Paramedics were called to the home but did not go to the ER bc it had gone back down

## 2022-02-17 NOTE — TELEPHONE ENCOUNTER
Contacted pt at [facility] number. Two patient Identifiers confirmed. Pt tated her HR was 212 x 1 week ago. Pt stated when she changed position it dropped to 80. Pt stated this tuesday to 11 pm her HR increased to 195. Pt stated she changed positions and it decreased to 160 then to 80s. Pt stated she feels as if her \" heart is fluttering. \"  Pt stated she is taking her coreg 6.25 mg bid. Pt verbalized understanding.

## 2022-02-17 NOTE — TELEPHONE ENCOUNTER
Verbal order and read back per Fransisca Buchanan MD  Schedule pt for in office orthostatic Bp check

## 2022-02-19 LAB
BILIRUB UR QL STRIP: NEGATIVE
GLUCOSE UR-MCNC: NEGATIVE MG/DL
KETONES P FAST UR STRIP-MCNC: NEGATIVE MG/DL
PH UR STRIP: 6.5 [PH] (ref 4.6–8)
PROT UR QL STRIP: NEGATIVE
SP GR UR STRIP: 1.01 (ref 1–1.03)
UA UROBILINOGEN AMB POC: ABNORMAL (ref 0.2–1)
URINALYSIS CLARITY POC: CLEAR
URINALYSIS COLOR POC: YELLOW
URINE BLOOD POC: ABNORMAL
URINE LEUKOCYTES POC: ABNORMAL
URINE NITRITES POC: POSITIVE

## 2022-02-22 ENCOUNTER — TELEPHONE (OUTPATIENT)
Dept: CARDIOLOGY CLINIC | Age: 72
End: 2022-02-22

## 2022-02-22 NOTE — TELEPHONE ENCOUNTER
Attempted to contact pt at  number, no answer. Lvm for pt to return call to office at [phone]. Will continue to try to contact pt.

## 2022-02-22 NOTE — TELEPHONE ENCOUNTER
Contacted pt at [facility] number. Two patient Identifiers confirmed. Advised pt per Dr Carolyne Marsh. Pt verbalized understanding. Yes

## 2022-02-25 ENCOUNTER — CLINICAL SUPPORT (OUTPATIENT)
Dept: CARDIOLOGY CLINIC | Age: 72
End: 2022-02-25

## 2022-02-25 VITALS — DIASTOLIC BLOOD PRESSURE: 70 MMHG | SYSTOLIC BLOOD PRESSURE: 129 MMHG | HEART RATE: 76 BPM

## 2022-02-25 DIAGNOSIS — Z01.30 BLOOD PRESSURE CHECK: Primary | ICD-10-CM

## 2022-02-25 NOTE — PROGRESS NOTES
Brissa Monte was seen in our office today for BP evaluation. Listed below are the patients current meds:      Current Outpatient Medications:     nitrofurantoin, macrocrystal-monohydrate, (Macrobid) 100 mg capsule, Take 1 Capsule by mouth two (2) times a day., Disp: 14 Capsule, Rfl: 0    carvediloL (COREG) 6.25 mg tablet, TAKE 1 TABLET BY MOUTH TWICE DAILY WITH MEALS, Disp: 180 Tablet, Rfl: 3    diclofenac (VOLTAREN) 1 % gel, Apply 2 g to affected area four (4) times daily. For hand pain, Disp: 100 g, Rfl: 12    dextran 70-hypromellose (ARTIFICIAL TEARS,[id number],) ophthalmic solution, Administer 1 Drop to both eyes as needed. , Disp: , Rfl:     fexofenadine-pseudoephedrine (ALLEGRA-D 12 HOUR)  mg Tb12, Take 1 Tab by mouth every twelve (12) hours. , Disp: , Rfl:     polyethylene glycol (MIRALAX) 17 gram packet, Take 1 Packet by mouth daily. , Disp: 100 Each, Rfl: 12      No current facility-administered medications for this visit. Visit Vitals  /70   Pulse 76       Plan:     Patient to continue current medications. Advised patient that I would forward to Dr. Gonzalo Nieto  for review and further instructions. Advised patient that we would call within 24-48 hours with any changes. Patient verbalized understanding.

## 2022-02-25 NOTE — Clinical Note
Pt in office today. BP reading was normal. Will have print out for you to review at office. Samia Chin

## 2022-03-01 ENCOUNTER — OFFICE VISIT (OUTPATIENT)
Dept: CARDIOLOGY CLINIC | Age: 72
End: 2022-03-01
Payer: MEDICARE

## 2022-03-01 VITALS
HEART RATE: 70 BPM | BODY MASS INDEX: 21.43 KG/M2 | RESPIRATION RATE: 16 BRPM | WEIGHT: 121 LBS | OXYGEN SATURATION: 98 % | SYSTOLIC BLOOD PRESSURE: 90 MMHG | DIASTOLIC BLOOD PRESSURE: 50 MMHG | TEMPERATURE: 98.3 F

## 2022-03-01 DIAGNOSIS — I47.1 SVT (SUPRAVENTRICULAR TACHYCARDIA) (HCC): Primary | ICD-10-CM

## 2022-03-01 PROCEDURE — G8536 NO DOC ELDER MAL SCRN: HCPCS | Performed by: INTERNAL MEDICINE

## 2022-03-01 PROCEDURE — G8510 SCR DEP NEG, NO PLAN REQD: HCPCS | Performed by: INTERNAL MEDICINE

## 2022-03-01 PROCEDURE — G9899 SCRN MAM PERF RSLTS DOC: HCPCS | Performed by: INTERNAL MEDICINE

## 2022-03-01 PROCEDURE — G8428 CUR MEDS NOT DOCUMENT: HCPCS | Performed by: INTERNAL MEDICINE

## 2022-03-01 PROCEDURE — 3017F COLORECTAL CA SCREEN DOC REV: CPT | Performed by: INTERNAL MEDICINE

## 2022-03-01 PROCEDURE — 1101F PT FALLS ASSESS-DOCD LE1/YR: CPT | Performed by: INTERNAL MEDICINE

## 2022-03-01 PROCEDURE — 1090F PRES/ABSN URINE INCON ASSESS: CPT | Performed by: INTERNAL MEDICINE

## 2022-03-01 PROCEDURE — G8399 PT W/DXA RESULTS DOCUMENT: HCPCS | Performed by: INTERNAL MEDICINE

## 2022-03-01 PROCEDURE — G8420 CALC BMI NORM PARAMETERS: HCPCS | Performed by: INTERNAL MEDICINE

## 2022-03-01 PROCEDURE — 99215 OFFICE O/P EST HI 40 MIN: CPT | Performed by: INTERNAL MEDICINE

## 2022-03-01 NOTE — PROGRESS NOTES
[facility]    Ms. Junior Corrigan is 66 y.o. female with a history of SVT, ovarian cancer    Patient is here today for follow-up appointment. She denies any prior history of MI or CHF. Patient was diagnosed with narrow complex tachycardia by EKG in 07/25/2020. She was admitted to the hospital at the time and beta-blocker was started. She also had a echocardiogram and nuclear stress test in 7/2020    Patient has been having some episode of palpitation on and off over last few weeks. Patient does have a pulse monitoring device at home and 2-3 times her pulse has been as high as 180-200 bpm without any exertional activities. She feels somewhat tired fatigue and dizzy at that time. She was almost about to call 911 however those episode resolved itself  Patient also has been having some diarrhea for last 2 days as she has taken more than usual dose of MiraLAX. She has been feeling dizzy  Her blood pressure at home is usually 544-417 systolic  Denies any nausea, vomiting, abdominal pain, fever, chills, sputum production. No hematuria or other bleeding complaints    Past Medical History:   Diagnosis Date    Alopecia 1/13/2011    Arthritis     Halitosis 1/13/2011    Menopause     Ovarian cancer ([facility] [address]     02/1998    Restless leg syndrome     SVT (supraventricular tachycardia) (HCC)          Past Surgical History:   Procedure Laterality Date    HX CATARACT REMOVAL Right     HX GYN      HX HYSTERECTOMY         Current Outpatient Medications   Medication Sig    nitrofurantoin, macrocrystal-monohydrate, (Macrobid) 100 mg capsule Take 1 Capsule by mouth two (2) times a day.  carvediloL (COREG) 6.25 mg tablet TAKE 1 TABLET BY MOUTH TWICE DAILY WITH MEALS    diclofenac (VOLTAREN) 1 % gel Apply 2 g to affected area four (4) times daily.  For hand pain    dextran 70-hypromellose (ARTIFICIAL TEARS,[id number],) ophthalmic solution Administer 1 Drop to both eyes as needed.  fexofenadine-pseudoephedrine (ALLEGRA-D 12 HOUR)  mg Tb12 Take 1 Tab by mouth every twelve (12) hours.  polyethylene glycol (MIRALAX) 17 gram packet Take 1 Packet by mouth daily. No current facility-administered medications for this visit. Allergies and Sensitivities:  No Known Allergies    Family History:  Family History   Problem Relation Age of Onset    Heart Disease Mother        Social History:  Social History     Tobacco Use    Smoking status: Never Smoker    Smokeless tobacco: Never Used   Substance Use Topics    Alcohol use: No    Drug use: No     She  reports that she has never smoked. She has never used smokeless tobacco.  She  reports no history of alcohol use. Review of Systems:  Cardiac symptoms as noted above in HPI. All others negative. Physical Exam:  BP Readings from Last 3 Encounters:   03/01/22 (!) 90/50   02/25/22 129/70   02/03/22 125/60         Pulse Readings from Last 3 Encounters:   03/01/22 70   02/25/22 76   02/03/22 79          Wt Readings from Last 3 Encounters:   03/01/22 54.9 kg (121 lb)   02/03/22 54.3 kg (119 lb 12.8 oz)   09/03/21 55.3 kg (122 lb)       Constitutional: Oriented to person, place, and time. HENT: Head: Normocephalic and atraumatic. Neck: No JVD present. Carotid bruit is not appreciated. Cardiovascular: Regular rhythm. No murmur, gallop or rubs appreciated  Lung: Breath sounds normal. No respiratory distress. No ronchi or rales appreciated  Abdominal: No tenderness. No rebound and no guarding. Musculoskeletal: There is no lower extremity edema.  No cynosis      Review of Data  LABS:   Lab Results   Component Value Date/Time    Sodium 137 07/26/2020 02:15 AM    Potassium 3.8 07/26/2020 02:15 AM    Chloride 105 07/26/2020 02:15 AM    CO2 27 07/26/2020 02:15 AM    Glucose 80 07/26/2020 02:15 AM    BUN 11 07/26/2020 02:15 AM    Creatinine 0.62 07/26/2020 02:15 AM     Lipids Latest Ref Rng & Units 7/26/2020 5/31/2018   Chol, Total <200 MG/ 182   HDL 40 - 60 MG/DL 84(H) 78   LDL 0 - 100 MG/(H) 95   Trig <150 MG/DL 60 45   Chol/HDL Ratio 0 - 5.0   2.3 -   Some recent data might be hidden     Lab Results   Component Value Date/Time    ALT (SGPT) 19 07/26/2020 02:15 AM     Lab Results   Component Value Date/Time    Hemoglobin A1c 5.1 11/24/2015 03:48 PM       EKG    ECHO(07/20)  ft Ventricle  Normal cavity size, wall thickness and systolic function (ejection fraction normal). The estimated ejection fraction is 55 - 60%. Visually measured ejection fraction. There is inconclusive left ventricular diastolic function. Elevated left ventricular diastolic pressure. Wall Scoring  The left ventricular wall motion is normal.             Left Atrium  Normal cavity size. Right Ventricle  Normal cavity size and global systolic function. Right Atrium  Normal cavity size. Aortic Valve  Normal valve structure and no stenosis. Trace aortic valve regurgitation. Mitral Valve  Normal valve structure and no stenosis. Trace regurgitation. Tricuspid Valve  Normal valve structure and no stenosis. Mild regurgitation. Pulmonic Valve  Pulmonic valve not well visualized. No stenosis. Trace regurgitation. Pulmonary Artery  Pulmonary arteries not assessed and pulmonary arteries not well visualized. Pulmonary arterial systolic pressure (PASP) is 22 mmHg. Pulmonary hypertension not suggested by Doppler findings. STRESS TEST (07/20)  · Baseline ECG: Normal EKG, non-specific ST-T wave abnormalities. · Gated SPECT: Left ventricular function post-stress was normal. Calculated ejection fraction is 61%. · There was no convincing evidence of significant reversible defect to suggest on going major ischemia. No significant fixed defect.   · Myocardial perfusion imaging supports a low risk stress test.    CATHETERIZATION    IMPRESSION & PLAN:  Ms. Justus White is 66 y.o. female    SVT/narrow complex tachycardia  Diagnosed in July 2020 by EKG. Heart rate at the time was 200 bpm.  Responsive to adenosine to sinus rhythm  On exam today appears to be sinus rhythm  Patient had 2-3 episode of tachycardia with heart rate around 180-200 bpm at home over last few weeks despite being on beta-blocker. At this time I recommended patient undergo EP study and SVT ablation  Risk, benefit, alternatives and complication of EP study and SVT ablation including but not limited to cardiac arrest, MI, pacemaker need. .. EDC discussed with the patient  Will arrange for EP study and ablation  We will continue beta-blocker    Hypertension:  /59 today. Currently on Coreg    Dizziness:  Patient has been feeling some dizziness lately. Patient has been taking MiraLAX more than usual dose to help with constipation and now she feels like last 2 days she has been having diarrhea  She is slightly orthostatic in the clinic with systolic blood pressure dropped from 112 down to 90 upon standing  I have asked her to stop MiraLAX for few days. Advised aggressive oral hydration. And also advised patient to reduce dose of MiraLAX in future. I will order echocardiogram to rule out any abnormal cardiac structure especially with recurrent episode of SVT as well    Importance of diet and exercise was discussed with patient. This plan was discussed with patient who is in agreement. Thank you for allowing me to participate in patient care. Please feel free to call me if you have any question or concern. Gladys Valadez MD  Please note: This document has been produced using voice recognition software. Unrecognized errors in transcription may be present.

## 2022-03-01 NOTE — PROGRESS NOTES
Identified pt with two pt identifiers(name and ). Reviewed record in preparation for visit and have obtained necessary documentation. Marco Marrero presents today for   Chief Complaint   Patient presents with   [address] preferred language for health care discussion is english/other. Personal Protective Equipment:   Personal Protective Equipment was used including: mask-surgical and hands-gloves. Patient was placed on no precaution(s). Patient was masked. Precautions:   Patient currently on None  Patient currently roomed with door closed. Is someone accompanying this pt? Yes     Is the patient using any DME equipment during OV?no    Depression Screening:  3 most recent PHQ Screens 2/3/2022   PHQ Not Done -   Little interest or pleasure in doing things Not at all   Feeling down, depressed, irritable, or hopeless Not at all   Total Score PHQ 2 0       Learning Assessment:  Learning Assessment 2018   PRIMARY LEARNER Patient   PRIMARY LANGUAGE ENGLISH   LEARNER PREFERENCE PRIMARY DEMONSTRATION     -     -   ANSWERED BY patient   RELATIONSHIP SELF       Abuse Screening:  Abuse Screening Questionnaire 2018   Do you ever feel afraid of your partner? N   Are you in a relationship with someone who physically or mentally threatens you? N   Is it safe for you to go home? Y       Fall Risk  Fall Risk Assessment, last 12 mths 2/3/2022   Able to walk? Yes   Fall in past 12 months? 0   Do you feel unsteady? 0   Are you worried about falling 0       Pt currently taking Anticoagulant therapy? No  Pt currently taking Antiplatelet therapy? no    Coordination of Care:  1. Have you been to the ER, urgent care clinic since your last visit? Hospitalized since your last visit? yes    2. Have you seen or consulted any other health care providers outside of the [address] [place] [address] since your last visit? Include any pap smears or colon screening.  no      Please see Red banners under Allergies and Med Rec to remove outside inquires. All correct information has been verified with patient and added to chart.      Medication's patient's would liked removed has been marked not taking to be removed per Verbal order and read back per Michelle Mackay MD

## 2022-03-01 NOTE — PROGRESS NOTES
[facility]    Ms. Luis Alfredo Norman is 66 y.o. female with a history of SVT, ovarian cancer    Patient is here today for follow-up appointment. Denies any hospital mission or ER visits since last time for cardiac reason. Denies any excessive palpitation. Denies presyncope or syncope. Denies any symptoms concerning for angina or heart failure  Denies any nausea, vomiting, abdominal pain, fever, chills, sputum production. No hematuria or other bleeding complaints    Past Medical History:   Diagnosis Date    Alopecia 1/13/2011    Arthritis     Halitosis 1/13/2011    Menopause     Ovarian cancer ([facility] [address]     02/1998    Restless leg syndrome     SVT (supraventricular tachycardia) (HCC)          Past Surgical History:   Procedure Laterality Date    HX CATARACT REMOVAL Right     HX GYN      HX HYSTERECTOMY         Current Outpatient Medications   Medication Sig    nitrofurantoin, macrocrystal-monohydrate, (Macrobid) 100 mg capsule Take 1 Capsule by mouth two (2) times a day.  carvediloL (COREG) 6.25 mg tablet TAKE 1 TABLET BY MOUTH TWICE DAILY WITH MEALS    diclofenac (VOLTAREN) 1 % gel Apply 2 g to affected area four (4) times daily. For hand pain    dextran 70-hypromellose (ARTIFICIAL TEARS,[id number],) ophthalmic solution Administer 1 Drop to both eyes as needed.  fexofenadine-pseudoephedrine (ALLEGRA-D 12 HOUR)  mg Tb12 Take 1 Tab by mouth every twelve (12) hours.  polyethylene glycol (MIRALAX) 17 gram packet Take 1 Packet by mouth daily. No current facility-administered medications for this visit. Allergies and Sensitivities:  No Known Allergies    Family History:  Family History   Problem Relation Age of Onset    Heart Disease Mother        Social History:  Social History     Tobacco Use    Smoking status: Never Smoker    Smokeless tobacco: Never Used   Substance Use Topics    Alcohol use: No    Drug use:  No She  reports that she has never smoked. She has never used smokeless tobacco.  She  reports no history of alcohol use. Review of Systems:  Cardiac symptoms as noted above in HPI. All others negative. Physical Exam:  BP Readings from Last 3 Encounters:   02/25/22 129/70   02/03/22 125/60   11/01/21 125/61         Pulse Readings from Last 3 Encounters:   02/25/22 76   02/03/22 79   11/01/21 63          Wt Readings from Last 3 Encounters:   02/03/22 54.3 kg (119 lb 12.8 oz)   09/03/21 55.3 kg (122 lb)   08/19/20 53 kg (116 lb 12.8 oz)       Constitutional: Oriented to person, place, and time. HENT: Head: Normocephalic and atraumatic. Neck: No JVD present. Carotid bruit is not appreciated. Cardiovascular: Regular rhythm. No murmur, gallop or rubs appreciated  Lung: Breath sounds normal. No respiratory distress. No ronchi or rales appreciated  Abdominal: No tenderness. No rebound and no guarding. Musculoskeletal: There is no lower extremity edema. No cynosis      Review of Data  LABS:   Lab Results   Component Value Date/Time    Sodium 137 07/26/2020 02:15 AM    Potassium 3.8 07/26/2020 02:15 AM    Chloride 105 07/26/2020 02:15 AM    CO2 27 07/26/2020 02:15 AM    Glucose 80 07/26/2020 02:15 AM    BUN 11 07/26/2020 02:15 AM    Creatinine 0.62 07/26/2020 02:15 AM     Lipids Latest Ref Rng & Units 7/26/2020 5/31/2018   Chol, Total <200 MG/ 182   HDL 40 - 60 MG/DL 84(H) 78   LDL 0 - 100 MG/(H) 95   Trig <150 MG/DL 60 45   Chol/HDL Ratio 0 - 5.0   2.3 -   Some recent data might be hidden     Lab Results   Component Value Date/Time    ALT (SGPT) 19 07/26/2020 02:15 AM     Lab Results   Component Value Date/Time    Hemoglobin A1c 5.1 11/24/2015 03:48 PM       EKG    ECHO(07/20)  ft Ventricle  Normal cavity size, wall thickness and systolic function (ejection fraction normal). The estimated ejection fraction is 55 - 60%. Visually measured ejection fraction.  There is inconclusive left ventricular diastolic function. Elevated left ventricular diastolic pressure. Wall Scoring  The left ventricular wall motion is normal.             Left Atrium  Normal cavity size. Right Ventricle  Normal cavity size and global systolic function. Right Atrium  Normal cavity size. Aortic Valve  Normal valve structure and no stenosis. Trace aortic valve regurgitation. Mitral Valve  Normal valve structure and no stenosis. Trace regurgitation. Tricuspid Valve  Normal valve structure and no stenosis. Mild regurgitation. Pulmonic Valve  Pulmonic valve not well visualized. No stenosis. Trace regurgitation. Pulmonary Artery  Pulmonary arteries not assessed and pulmonary arteries not well visualized. Pulmonary arterial systolic pressure (PASP) is 22 mmHg. Pulmonary hypertension not suggested by Doppler findings. STRESS TEST (07/20)  · Baseline ECG: Normal EKG, non-specific ST-T wave abnormalities. · Gated SPECT: Left ventricular function post-stress was normal. Calculated ejection fraction is 61%. · There was no convincing evidence of significant reversible defect to suggest on going major ischemia. No significant fixed defect. · Myocardial perfusion imaging supports a low risk stress test.    CATHETERIZATION    IMPRESSION & PLAN:  Ms. Roseanne Zapata is 66 y.o. female    SVT/narrow complex tachycardia  Diagnosed in July 2020 by EKG. Heart rate at the time was 200 bpm.  Responsive to adenosine to sinus rhythm  On exam today appears to be sinus rhythm  Denies any symptoms at this time  We will continue beta-blocker    Hypertension:  /68. Will discontinue Toprol and use nonselective Coreg 6.25 mg twice daily for blood pressure control. Have advised patient to salt restriction. Keep checking blood pressure at home over next 2 weeks and bring diary in our clinic for blood pressure check in 2 weeks. Importance of diet and exercise was discussed with patient.     This plan was discussed with patient who is in agreement. Thank you for allowing me to participate in patient care. Please feel free to call me if you have any question or concern. Ginna Omalley MD  Please note: This document has been produced using voice recognition software. Unrecognized errors in transcription may be present.

## 2022-03-01 NOTE — LETTER
3/1/2022    Patient: Christine Palencia   YOB: 1950   Date of Visit: 3/1/2022     Sonido Silver MD  [id number] [facility]  [address] [place] [address] [address]  [address] [facility] [address]    Dear Sonido Silver MD,      Thank you for referring Ms. Rony Cervantes to [facility] for evaluation. My notes for this consultation are attached. If you have questions, please do not hesitate to call me. I look forward to following your patient along with you.       Sincerely,    Akhil Coronado MD

## 2022-03-01 NOTE — PATIENT INSTRUCTIONS
Testing   Echo**call office 3-5 days after testing is completed for results**         Referred to Dr Yamile Fletcher ( Ablation and EP study)

## 2022-03-08 ENCOUNTER — TELEPHONE (OUTPATIENT)
Dept: CARDIOLOGY CLINIC | Age: 72
End: 2022-03-08

## 2022-03-09 ENCOUNTER — HOSPITAL ENCOUNTER (OUTPATIENT)
Dept: LAB | Age: 72
Discharge: HOME | End: 2022-03-09
Payer: MEDICARE

## 2022-03-09 DIAGNOSIS — I47.1 SVT (SUPRAVENTRICULAR TACHYCARDIA) (HCC): Primary | ICD-10-CM

## 2022-03-09 DIAGNOSIS — I47.1 SVT (SUPRAVENTRICULAR TACHYCARDIA) (HCC): ICD-10-CM

## 2022-03-09 LAB
ALBUMIN SERPL-MCNC: 3.5 G/DL (ref 3.4–5)
ALBUMIN/GLOB SERPL: 1.2 {RATIO} (ref 0.8–1.7)
ALP SERPL-CCNC: 73 U/L (ref 45–117)
ALT SERPL-CCNC: 25 U/L (ref 13–56)
ANION GAP SERPL CALC-SCNC: 6 MMOL/L (ref 3–18)
AST SERPL-CCNC: 16 U/L (ref 10–38)
BASOPHILS # BLD: 0 K/UL (ref 0–0.1)
BASOPHILS NFR BLD: 0 % (ref 0–2)
BILIRUB SERPL-MCNC: 0.4 MG/DL (ref 0.2–1)
BUN SERPL-MCNC: 19 MG/DL (ref 7–18)
BUN/CREAT SERPL: 22 (ref 12–20)
CALCIUM SERPL-MCNC: 9.1 MG/DL (ref 8.5–10.1)
CHLORIDE SERPL-SCNC: 107 MMOL/L (ref 100–111)
CO2 SERPL-SCNC: 27 MMOL/L (ref 21–32)
CREAT SERPL-MCNC: 0.88 MG/DL (ref 0.6–1.3)
DIFFERENTIAL METHOD BLD: ABNORMAL
EOSINOPHIL # BLD: 0.3 K/UL (ref 0–0.4)
EOSINOPHIL NFR BLD: 4 % (ref 0–5)
ERYTHROCYTE [DISTWIDTH] IN BLOOD BY AUTOMATED COUNT: 12.6 % (ref 11.6–14.5)
GLOBULIN SER CALC-MCNC: 2.9 G/DL (ref 2–4)
GLUCOSE SERPL-MCNC: 78 MG/DL (ref 74–99)
HCT VFR BLD AUTO: 37.1 % (ref 35–45)
HGB BLD-MCNC: 12.1 G/DL (ref 12–16)
IMM GRANULOCYTES # BLD AUTO: 0 K/UL (ref 0–0.04)
IMM GRANULOCYTES NFR BLD AUTO: 0 % (ref 0–0.5)
INR PPP: 0.9 (ref 0.8–1.2)
LYMPHOCYTES # BLD: 1.6 K/UL (ref 0.9–3.6)
LYMPHOCYTES NFR BLD: 20 % (ref 21–52)
MCH RBC QN AUTO: 30.9 PG (ref 24–34)
MCHC RBC AUTO-ENTMCNC: 32.6 G/DL (ref 31–37)
MCV RBC AUTO: 94.9 FL (ref 78–100)
MONOCYTES # BLD: 0.6 K/UL (ref 0.05–1.2)
MONOCYTES NFR BLD: 7 % (ref 3–10)
NEUTS SEG # BLD: 5.4 K/UL (ref 1.8–8)
NEUTS SEG NFR BLD: 68 % (ref 40–73)
NRBC # BLD: 0 K/UL (ref 0–0.01)
NRBC BLD-RTO: 0 PER 100 WBC
PLATELET # BLD AUTO: 218 K/UL (ref 135–420)
PMV BLD AUTO: 10.4 FL (ref 9.2–11.8)
POTASSIUM SERPL-SCNC: 5.1 MMOL/L (ref 3.5–5.5)
PROT SERPL-MCNC: 6.4 G/DL (ref 6.4–8.2)
PROTHROMBIN TIME: 12.5 SEC (ref 11.5–15.2)
RBC # BLD AUTO: 3.91 M/UL (ref 4.2–5.3)
SODIUM SERPL-SCNC: 140 MMOL/L (ref 136–145)
WBC # BLD AUTO: 8 K/UL (ref 4.6–13.2)

## 2022-03-09 PROCEDURE — 36415 COLL VENOUS BLD VENIPUNCTURE: CPT

## 2022-03-09 PROCEDURE — 85025 COMPLETE CBC W/AUTO DIFF WBC: CPT

## 2022-03-09 PROCEDURE — 80053 COMPREHEN METABOLIC PANEL: CPT

## 2022-03-09 PROCEDURE — 85610 PROTHROMBIN TIME: CPT

## 2022-03-11 ENCOUNTER — HOSPITAL ENCOUNTER (OUTPATIENT)
Dept: LAB | Age: 72
Discharge: HOME | End: 2022-03-11
Payer: MEDICARE

## 2022-03-11 DIAGNOSIS — Z13.220 ENCOUNTER FOR LIPID SCREENING FOR CARDIOVASCULAR DISEASE: ICD-10-CM

## 2022-03-11 DIAGNOSIS — Z82.49 FAMILY HISTORY OF MI (MYOCARDIAL INFARCTION): ICD-10-CM

## 2022-03-11 DIAGNOSIS — Z13.6 ENCOUNTER FOR LIPID SCREENING FOR CARDIOVASCULAR DISEASE: ICD-10-CM

## 2022-03-11 LAB
CHOLEST SERPL-MCNC: 195 MG/DL
HDLC SERPL-MCNC: 83 MG/DL (ref 40–60)
HDLC SERPL: 2.3 {RATIO} (ref 0–5)
LDLC SERPL CALC-MCNC: 99.8 MG/DL (ref 0–100)
LIPID PROFILE,FLP: ABNORMAL
TRIGL SERPL-MCNC: 61 MG/DL (ref ?–150)
VLDLC SERPL CALC-MCNC: 12.2 MG/DL

## 2022-03-11 PROCEDURE — 36415 COLL VENOUS BLD VENIPUNCTURE: CPT

## 2022-03-11 PROCEDURE — 80061 LIPID PANEL: CPT

## 2022-03-18 PROBLEM — I47.1 SVT (SUPRAVENTRICULAR TACHYCARDIA) (HCC): Status: ACTIVE | Noted: 2020-07-25

## 2022-03-18 PROBLEM — M15.0 PRIMARY OSTEOARTHRITIS INVOLVING MULTIPLE JOINTS: Status: ACTIVE | Noted: 2018-05-31

## 2022-03-18 PROBLEM — I47.10 SVT (SUPRAVENTRICULAR TACHYCARDIA): Status: ACTIVE | Noted: 2020-07-25

## 2022-03-18 PROBLEM — M15.9 PRIMARY OSTEOARTHRITIS INVOLVING MULTIPLE JOINTS: Status: ACTIVE | Noted: 2018-05-31

## 2022-04-14 ENCOUNTER — OFFICE VISIT (OUTPATIENT)
Dept: CARDIOLOGY CLINIC | Age: 72
End: 2022-04-14
Payer: MEDICARE

## 2022-04-14 VITALS
SYSTOLIC BLOOD PRESSURE: 124 MMHG | OXYGEN SATURATION: 98 % | WEIGHT: 120 LBS | BODY MASS INDEX: 21.26 KG/M2 | HEIGHT: 63 IN | DIASTOLIC BLOOD PRESSURE: 78 MMHG | HEART RATE: 63 BPM

## 2022-04-14 DIAGNOSIS — I47.1 SVT (SUPRAVENTRICULAR TACHYCARDIA) (HCC): Primary | ICD-10-CM

## 2022-04-14 PROCEDURE — G9899 SCRN MAM PERF RSLTS DOC: HCPCS | Performed by: INTERNAL MEDICINE

## 2022-04-14 PROCEDURE — G8536 NO DOC ELDER MAL SCRN: HCPCS | Performed by: INTERNAL MEDICINE

## 2022-04-14 PROCEDURE — G8510 SCR DEP NEG, NO PLAN REQD: HCPCS | Performed by: INTERNAL MEDICINE

## 2022-04-14 PROCEDURE — G8427 DOCREV CUR MEDS BY ELIG CLIN: HCPCS | Performed by: INTERNAL MEDICINE

## 2022-04-14 PROCEDURE — G8399 PT W/DXA RESULTS DOCUMENT: HCPCS | Performed by: INTERNAL MEDICINE

## 2022-04-14 PROCEDURE — 93000 ELECTROCARDIOGRAM COMPLETE: CPT | Performed by: INTERNAL MEDICINE

## 2022-04-14 PROCEDURE — 1101F PT FALLS ASSESS-DOCD LE1/YR: CPT | Performed by: INTERNAL MEDICINE

## 2022-04-14 PROCEDURE — G8420 CALC BMI NORM PARAMETERS: HCPCS | Performed by: INTERNAL MEDICINE

## 2022-04-14 PROCEDURE — 1090F PRES/ABSN URINE INCON ASSESS: CPT | Performed by: INTERNAL MEDICINE

## 2022-04-14 PROCEDURE — 3017F COLORECTAL CA SCREEN DOC REV: CPT | Performed by: INTERNAL MEDICINE

## 2022-04-14 PROCEDURE — 99215 OFFICE O/P EST HI 40 MIN: CPT | Performed by: INTERNAL MEDICINE

## 2022-04-14 RX ORDER — SODIUM CHLORIDE 0.9 % (FLUSH) 0.9 %
5-40 SYRINGE (ML) INJECTION AS NEEDED
Status: CANCELLED | OUTPATIENT
Start: 2022-04-14

## 2022-04-14 RX ORDER — SODIUM CHLORIDE 0.9 % (FLUSH) 0.9 %
5-40 SYRINGE (ML) INJECTION EVERY 8 HOURS
Status: CANCELLED | OUTPATIENT
Start: 2022-04-14

## 2022-04-14 RX ORDER — MELOXICAM 7.5 MG/1
7.5 TABLET ORAL DAILY
COMMUNITY
Start: 2022-04-07

## 2022-04-14 NOTE — PROGRESS NOTES
Juan Manuel Randolph presents today for   Chief Complaint   Patient presents with    New Patient     for possible ablation    Palpitations     heart fluttering    Shortness of Breath     with exertion    Leg Swelling     left foot       Parris Mares preferred language for health care discussion is english/other. Is someone accompanying this pt? yes    Is the patient using any DME equipment during [address]? no    Depression Screening:  3 most recent PHQ Screens 4/14/2022   PHQ Not Done -   Little interest or pleasure in doing things Not at all   Feeling down, depressed, irritable, or hopeless Not at all   Total Score PHQ 2 0       Learning Assessment:  Learning Assessment 4/14/2022   PRIMARY LEARNER Patient   PRIMARY LANGUAGE ENGLISH   LEARNER PREFERENCE PRIMARY DEMONSTRATION     -     -   ANSWERED BY patient   RELATIONSHIP SELF       Abuse Screening:  Abuse Screening Questionnaire 4/14/2022   Do you ever feel afraid of your partner? N   Are you in a relationship with someone who physically or mentally threatens you? N   Is it safe for you to go home? Y       Fall Risk  Fall Risk Assessment, last 12 mths 4/14/2022   Able to walk? Yes   Fall in past 12 months? 0   Do you feel unsteady? 0   Are you worried about falling 0           Pt currently taking Anticoagulant therapy? no    Pt currently taking Antiplatelet therapy ? no      Coordination of Care:  1. Have you been to the ER, urgent care clinic since your last visit? Hospitalized since your last visit? no    2. Have you seen or consulted any other health care providers outside of the [address] [place] [address] since your last visit? Include any pap smears or colon screening.  no

## 2022-04-14 NOTE — PROGRESS NOTES
History of Present Illness:  74year old female referred by Dr. Marly Gerard for evaluation of recurrent SVT. About four to five years ago she had some palpitations and tachycardia that was rather quiescent. Over the past three to four months it has gotten worse. She has been to the emergency department and called 911 on two occasions. She is under a lot of stress socially as well, which likely exacerbates some of her symptoms. She has been treated with various types of AV blockers, including Coreg and Metoprolol, with breakthrough. She has had dizziness without syncope. I have a documented narrow complex SVT at 200 bpm, terminated with adenosine July 25, 2020 that I was able to review. She is here to discuss options. Impression:  1. Recurrent SVT, symptomatic in nature, with dizziness, without loss of consciousness. It has been progressive and refractory to AV blocking agents. 2. History of echo 2020 with normal function. 3. Hypertension, controlled. 4. Stress test July 2020 without ischemia. Low risk imaging nuclear portion. Plan:  I had a lengthy discussion about her tachycardia, which appears to be narrow complex SVT at 200 bpm.  Very suspicious for AVNRT, although an atypical cannot be excluded, as well as possible accessory pathway with concealed conduction. I talked about proceeding to EP study with possible ablation, quoting a greater than 95% chance of success, but also 1% chance of risk, including stroke if left sided, as well as pacemaker. I will plan to hold Coreg 48 hours prior and make arrangements. Past Medical History:   Diagnosis Date    Alopecia 1/13/2011    Arthritis     Halitosis 1/13/2011    Menopause     Ovarian cancer ([facility] [address]     02/1998    Restless leg syndrome     SVT [facility]supraventricular tachycardia) ([facility])        Current Outpatient Medications   Medication Sig Dispense Refill    meloxicam (MOBIC) 7.5 mg tablet Take 7.5 mg by mouth daily.       carvediloL (COREG) 6.25 mg tablet TAKE 1 TABLET BY MOUTH TWICE DAILY WITH MEALS 180 Tablet 3    diclofenac (VOLTAREN) 1 % gel Apply 2 g to affected area four (4) times daily. For hand pain 100 g 12    dextran 70-hypromellose (ARTIFICIAL TEARS,[id number],) ophthalmic solution Administer 1 Drop to both eyes as needed.  nitrofurantoin, macrocrystal-monohydrate, (Macrobid) 100 mg capsule Take 1 Capsule by mouth two (2) times a day. (Patient not taking: Reported on 4/14/2022) 14 Capsule 0    fexofenadine-pseudoephedrine (ALLEGRA-D 12 HOUR)  mg Tb12 Take 1 Tab by mouth every twelve (12) hours. (Patient not taking: Reported on 4/14/2022)      polyethylene glycol (MIRALAX) 17 gram packet Take 1 Packet by mouth daily. (Patient not taking: Reported on 4/14/2022) 100 Each 12       Social History   reports that she has never smoked. She has never used smokeless tobacco.   reports no history of alcohol use. Family History  family history includes Heart Disease in her mother. Review of Systems  Except as stated above include:  Constitutional: Negative for fever, chills and malaise/fatigue. HEENT: No congestion or recent URI. Gastrointestinal: No nausea, vomiting, abdominal pain, bloody stools. Pulmonary:  Negative except as stated above. Cardiac:  Negative except as stated above. Musculoskeletal: Negative except as stated above. Neurological:  No localized symptoms. Skin:  Negative except as stated above. Psych:  Negative except as stated above. Endocrine:  Negative except as stated above. PHYSICAL EXAM  BP Readings from Last 3 Encounters:   04/14/22 124/78   03/09/22 128/62   03/01/22 (!) 90/50     Pulse Readings from Last 3 Encounters:   04/14/22 63   03/01/22 70   02/25/22 76     Wt Readings from Last 3 Encounters:   04/14/22 54.4 kg (120 lb)   03/09/22 54.9 kg (121 lb)   03/01/22 54.9 kg (121 lb)     General:   Well developed, well groomed.     Head/Neck:   No obvious jugular venous distention     No obvious carotid pulsations. No evidence of xanthelasma. Lungs:   No respiratory distress. Clear bilaterally. Heart:  Regular rate and rhythm. Normal S1/S2. Palpation grossly normal.    No significant murmurs, rubs or gallops. Abdomen:   Non-acute abdomen. No obvious pulsations. Extremities:   Intact peripheral pulses. No significant edema. Neurological:   Alert and oriented to person, place, time. No focal neurological deficit visually. Skin:   No obvious rash    Blood Pressure Metric:  Monitor recommended and adjustments stated if needed.

## 2022-04-14 NOTE — LETTER
4/14/2022 11:51 AM    Parris Rivas  [id number]  1950        Insurance:  [address] # _____________________      Proc Date: Fri 4/29                Proc Time:  10:30      Performing MD : Dr. Maribell Gan                      Procedure:EP/SVT                                         Scheduled with:  Lamberto Cruz                PCP:Trisha                                               Date:4/14/2022          HP: 4/14      EKG: ______    Labs:______  CXR: _______  Orders:  4/14          Special Instructions:  _____________________________________________________  ______________________________________________________________________  ______________________________________________________________________          The materials enclosed with this facsimile transmission are private and confidential and are the property of the sender. If you are not the intended recipient, be advised that any unauthorized use, disclosure, copying, distribution, or the taking of any action in reliance on the contents of this telecopied information is strictly prohibited. If you have received this in error, please immediately notify the sender via telephone to arrange for return of the forwarded documentation.

## 2022-04-14 NOTE — PATIENT INSTRUCTIONS
[facility]          Patient  EP Instructions      Please have lab work and chest x-ray completed around 7 days prior to procedure at [facility] or [facility]      1. You are scheduled to have a  EP Stuidy/ SVT Ablation on  April 29, 2022 , at 10:30 a.m. Please check in at 09:00 a.m.     2. Please go to [facility] and park in the outpatient parking lot that is located around to the back of the hospital and enter through the [facility]. Once you enter through the [facility] check in with the  there. The  will either give you directions or assist you in getting to the cath holding area. 3.  You are not to eat or drink anything after midnight the night before your procedure. 4. Please continue to take your medications with a small sip of water on the morning of the procedure with the following exceptions:  Hold (Do not take) Carvedilol for 48 hours prior to procedure. 5. If you are diabetic, do not take your insulin/sugar pill the morning of the procedure. 6. We encourage families to wait in the waiting room on the first floor while the procedure is being done. The Doctor will come out and talk with you as soon as the procedure is over. 7. There is the possibility that you may spend the night in the hospital, depending on the results of the procedure. This will be determined after the procedure is done. 8.   If you or your family have any questions, please call our office Monday-Friday 9:00am         -4:30 pm , at [phone], and ask to speak to one of the nurses.

## 2022-04-19 ENCOUNTER — TELEPHONE (OUTPATIENT)
Dept: FAMILY MEDICINE CLINIC | Age: 72
End: 2022-04-19

## 2022-04-19 ENCOUNTER — TRANSCRIBE ORDER (OUTPATIENT)
Dept: SCHEDULING | Age: 72
End: 2022-04-19

## 2022-04-21 DIAGNOSIS — N64.4 BREAST PAIN, RIGHT: Primary | ICD-10-CM

## 2022-04-21 DIAGNOSIS — N64.4 BREAST PAIN, RIGHT: ICD-10-CM

## 2022-04-22 ENCOUNTER — HOSPITAL ENCOUNTER (OUTPATIENT)
Dept: GENERAL RADIOLOGY | Age: 72
Discharge: HOME | End: 2022-04-22
Payer: MEDICARE

## 2022-04-22 ENCOUNTER — HOSPITAL ENCOUNTER (OUTPATIENT)
Dept: PREADMISSION TESTING | Age: 72
Discharge: HOME | End: 2022-04-22
Payer: MEDICARE

## 2022-04-22 DIAGNOSIS — I47.1 SVT (SUPRAVENTRICULAR TACHYCARDIA) (HCC): ICD-10-CM

## 2022-04-22 LAB
ALBUMIN SERPL-MCNC: 3.6 G/DL (ref 3.4–5)
ALBUMIN/GLOB SERPL: 1.2 {RATIO} (ref 0.8–1.7)
ALP SERPL-CCNC: 80 U/L (ref 45–117)
ALT SERPL-CCNC: 30 U/L (ref 13–56)
ANION GAP SERPL CALC-SCNC: 2 MMOL/L (ref 3–18)
AST SERPL-CCNC: 20 U/L (ref 10–38)
BASOPHILS # BLD: 0.1 K/UL (ref 0–0.1)
BASOPHILS NFR BLD: 1 % (ref 0–2)
BILIRUB SERPL-MCNC: 0.5 MG/DL (ref 0.2–1)
BUN SERPL-MCNC: 15 MG/DL (ref 7–18)
BUN/CREAT SERPL: 18 (ref 12–20)
CALCIUM SERPL-MCNC: 9.1 MG/DL (ref 8.5–10.1)
CHLORIDE SERPL-SCNC: 102 MMOL/L (ref 100–111)
CO2 SERPL-SCNC: 30 MMOL/L (ref 21–32)
CREAT SERPL-MCNC: 0.84 MG/DL (ref 0.6–1.3)
DIFFERENTIAL METHOD BLD: ABNORMAL
EOSINOPHIL # BLD: 0.2 K/UL (ref 0–0.4)
EOSINOPHIL NFR BLD: 3 % (ref 0–5)
ERYTHROCYTE [DISTWIDTH] IN BLOOD BY AUTOMATED COUNT: 12.7 % (ref 11.6–14.5)
GLOBULIN SER CALC-MCNC: 2.9 G/DL (ref 2–4)
GLUCOSE SERPL-MCNC: 95 MG/DL (ref 74–99)
HCT VFR BLD AUTO: 37.4 % (ref 35–45)
HGB BLD-MCNC: 12 G/DL (ref 12–16)
IMM GRANULOCYTES # BLD AUTO: 0 K/UL (ref 0–0.04)
IMM GRANULOCYTES NFR BLD AUTO: 1 % (ref 0–0.5)
INR PPP: 0.9 (ref 0.8–1.2)
LYMPHOCYTES # BLD: 1.3 K/UL (ref 0.9–3.6)
LYMPHOCYTES NFR BLD: 22 % (ref 21–52)
MCH RBC QN AUTO: 30 PG (ref 24–34)
MCHC RBC AUTO-ENTMCNC: 32.1 G/DL (ref 31–37)
MCV RBC AUTO: 93.5 FL (ref 78–100)
MONOCYTES # BLD: 0.5 K/UL (ref 0.05–1.2)
MONOCYTES NFR BLD: 8 % (ref 3–10)
NEUTS SEG # BLD: 3.7 K/UL (ref 1.8–8)
NEUTS SEG NFR BLD: 65 % (ref 40–73)
NRBC # BLD: 0 K/UL (ref 0–0.01)
NRBC BLD-RTO: 0 PER 100 WBC
PLATELET # BLD AUTO: 264 K/UL (ref 135–420)
PMV BLD AUTO: 10.2 FL (ref 9.2–11.8)
POTASSIUM SERPL-SCNC: 4.7 MMOL/L (ref 3.5–5.5)
PROT SERPL-MCNC: 6.5 G/DL (ref 6.4–8.2)
PROTHROMBIN TIME: 12.3 SEC (ref 11.5–15.2)
RBC # BLD AUTO: 4 M/UL (ref 4.2–5.3)
SODIUM SERPL-SCNC: 134 MMOL/L (ref 136–145)
WBC # BLD AUTO: 5.8 K/UL (ref 4.6–13.2)

## 2022-04-22 PROCEDURE — 85025 COMPLETE CBC W/AUTO DIFF WBC: CPT

## 2022-04-22 PROCEDURE — 80053 COMPREHEN METABOLIC PANEL: CPT

## 2022-04-22 PROCEDURE — 85610 PROTHROMBIN TIME: CPT

## 2022-04-22 PROCEDURE — 36415 COLL VENOUS BLD VENIPUNCTURE: CPT

## 2022-04-22 PROCEDURE — 71046 X-RAY EXAM CHEST 2 VIEWS: CPT

## 2022-04-26 NOTE — H&P
Plan EP study with possible SVT ablation. History of Present Illness:  74year old female referred by Dr. Shaina Torres for evaluation of recurrent SVT. About four to five years ago she had some palpitations and tachycardia that was rather quiescent. Over the past three to four months it has gotten worse. She has been to the emergency department and called 911 on two occasions. She is under a lot of stress socially as well, which likely exacerbates some of her symptoms. She has been treated with various types of AV blockers, including Coreg and Metoprolol, with breakthrough. She has had dizziness without syncope. I have a documented narrow complex SVT at 200 bpm, terminated with adenosine July 25, 2020 that I was able to review. She is here to discuss options. Impression:  1. Recurrent SVT, symptomatic in nature, with dizziness, without loss of consciousness. It has been progressive and refractory to AV blocking agents. 2. History of echo 2020 with normal function. 3. Hypertension, controlled. 4. Stress test July 2020 without ischemia. Low risk imaging nuclear portion.     Plan:  I had a lengthy discussion about her tachycardia, which appears to be narrow complex SVT at 200 bpm.  Very suspicious for AVNRT, although an atypical cannot be excluded, as well as possible accessory pathway with concealed conduction. I talked about proceeding to EP study with possible ablation, quoting a greater than 95% chance of success, but also 1% chance of risk, including stroke if left sided, as well as pacemaker.   I will plan to hold Coreg 48 hours prior and make arrangements.             Past Medical History:   Diagnosis Date    Alopecia 1/13/2011    Arthritis      Halitosis 1/13/2011    Menopause      Ovarian cancer ([facility] [address]       02/1998    Restless leg syndrome      SVT (supraventricular tachycardia) ([facility])                  Current Outpatient Medications   Medication Sig Dispense Refill    meloxicam (MOBIC) 7.5 mg tablet Take 7.5 mg by mouth daily.        carvediloL (COREG) 6.25 mg tablet TAKE 1 TABLET BY MOUTH TWICE DAILY WITH MEALS 180 Tablet 3    diclofenac (VOLTAREN) 1 % gel Apply 2 g to affected area four (4) times daily. For hand pain 100 g 12    dextran 70-hypromellose (ARTIFICIAL TEARS,[id number],) ophthalmic solution Administer 1 Drop to both eyes as needed.        nitrofurantoin, macrocrystal-monohydrate, (Macrobid) 100 mg capsule Take 1 Capsule by mouth two (2) times a day. (Patient not taking: Reported on 4/14/2022) 14 Capsule 0    fexofenadine-pseudoephedrine (ALLEGRA-D 12 HOUR)  mg Tb12 Take 1 Tab by mouth every twelve (12) hours. (Patient not taking: Reported on 4/14/2022)        polyethylene glycol (MIRALAX) 17 gram packet Take 1 Packet by mouth daily. (Patient not taking: Reported on 4/14/2022) 100 Each 12         Social History   reports that she has never smoked. She has never used smokeless tobacco.   reports no history of alcohol use.     Family History  family history includes Heart Disease in her mother.     Review of Systems  Except as stated above include:  Constitutional: Negative for fever, chills and malaise/fatigue. HEENT: No congestion or recent URI. Gastrointestinal: No nausea, vomiting, abdominal pain, bloody stools. Pulmonary:  Negative except as stated above. Cardiac:  Negative except as stated above. Musculoskeletal: Negative except as stated above. Neurological:  No localized symptoms. Skin:  Negative except as stated above. Psych:  Negative except as stated above.   Endocrine:  Negative except as stated above.     PHYSICAL EXAM      BP Readings from Last 3 Encounters:   04/14/22 124/78   03/09/22 128/62   03/01/22 (!) 90/50          Pulse Readings from Last 3 Encounters:   04/14/22 63   03/01/22 70   02/25/22 76          Wt Readings from Last 3 Encounters:   04/14/22 54.4 kg (120 lb)   03/09/22 54.9 kg (121 lb)   03/01/22 54.9 kg (121 lb)      General: Well developed, well groomed. Head/Neck:     No obvious jugular venous distention                           No obvious carotid pulsations. No evidence of xanthelasma. Lungs:             No respiratory distress. Clear bilaterally. Heart:              Regular rate and rhythm. Normal S1/S2. Palpation grossly normal.                          No significant murmurs, rubs or gallops. Abdomen:        Non-acute abdomen. No obvious pulsations. Extremities:     Intact peripheral pulses. No significant edema. Neurological:   Alert and oriented to person, place, time. No focal neurological deficit visually.   Skin:                No obvious rash     Blood Pressure Metric:  Monitor recommended and adjustments stated if needed.         Electronically signed by Anil French MD at 04/14/22 5202

## 2022-04-29 ENCOUNTER — HOSPITAL ENCOUNTER (OUTPATIENT)
Age: 72
Setting detail: OUTPATIENT SURGERY
Discharge: HOME | End: 2022-04-29
Attending: INTERNAL MEDICINE | Admitting: INTERNAL MEDICINE
Payer: MEDICARE

## 2022-04-29 ENCOUNTER — ANESTHESIA EVENT (OUTPATIENT)
Dept: CARDIAC CATH/INVASIVE PROCEDURES | Age: 72
End: 2022-04-29
Payer: MEDICARE

## 2022-04-29 ENCOUNTER — ANESTHESIA (OUTPATIENT)
Dept: CARDIAC CATH/INVASIVE PROCEDURES | Age: 72
End: 2022-04-29
Payer: MEDICARE

## 2022-04-29 VITALS
BODY MASS INDEX: 21.71 KG/M2 | WEIGHT: 118 LBS | RESPIRATION RATE: 20 BRPM | HEART RATE: 84 BPM | TEMPERATURE: 98.1 F | HEIGHT: 62 IN | DIASTOLIC BLOOD PRESSURE: 80 MMHG | OXYGEN SATURATION: 99 % | SYSTOLIC BLOOD PRESSURE: 166 MMHG

## 2022-04-29 DIAGNOSIS — I47.1 SVT (SUPRAVENTRICULAR TACHYCARDIA) (HCC): ICD-10-CM

## 2022-04-29 PROCEDURE — C1732 CATH, EP, DIAG/ABL, 3D/VECT: HCPCS | Performed by: INTERNAL MEDICINE

## 2022-04-29 PROCEDURE — 93620 COMP EP EVL R AT VEN PAC&REC: CPT | Performed by: INTERNAL MEDICINE

## 2022-04-29 PROCEDURE — 77030027107 HC PTCH EXT REF CARTO3 J&J -F: Performed by: INTERNAL MEDICINE

## 2022-04-29 PROCEDURE — 93621 COMP EP EVL L PAC&REC C SINS: CPT | Performed by: INTERNAL MEDICINE

## 2022-04-29 PROCEDURE — 00534 ANES INSERTION/RPLCMT CVDFB: CPT | Performed by: ANESTHESIOLOGY

## 2022-04-29 PROCEDURE — 93613 INTRACARDIAC EPHYS 3D MAPG: CPT | Performed by: INTERNAL MEDICINE

## 2022-04-29 PROCEDURE — 93609 INTRA-VNTR MAPG TCHYCAR SITE: CPT | Performed by: INTERNAL MEDICINE

## 2022-04-29 PROCEDURE — 93623 PRGRMD STIMJ&PACG IV RX NFS: CPT | Performed by: INTERNAL MEDICINE

## 2022-04-29 PROCEDURE — C1730 CATH, EP, 19 OR FEW ELECT: HCPCS | Performed by: INTERNAL MEDICINE

## 2022-04-29 PROCEDURE — 77030018729 HC ELECTRD DEFIB PAD CARD -B: Performed by: INTERNAL MEDICINE

## 2022-04-29 PROCEDURE — 76060000033 HC ANESTHESIA 1 TO 1.5 HR: Performed by: INTERNAL MEDICINE

## 2022-04-29 PROCEDURE — 99100 ANES PT EXTEME AGE<1 YR&>70: CPT | Performed by: ANESTHESIOLOGY

## 2022-04-29 PROCEDURE — 74011250636 HC RX REV CODE- 250/636: Performed by: INTERNAL MEDICINE

## 2022-04-29 PROCEDURE — 74011000250 HC RX REV CODE- 250: Performed by: INTERNAL MEDICINE

## 2022-04-29 PROCEDURE — 77030035291 HC TBNG PMP SMARTABLATE J&J -B: Performed by: INTERNAL MEDICINE

## 2022-04-29 PROCEDURE — C1894 INTRO/SHEATH, NON-LASER: HCPCS | Performed by: INTERNAL MEDICINE

## 2022-04-29 PROCEDURE — C1760 CLOSURE DEV, VASC: HCPCS | Performed by: INTERNAL MEDICINE

## 2022-04-29 PROCEDURE — 74011250636 HC RX REV CODE- 250/636: Performed by: ANESTHESIOLOGY

## 2022-04-29 RX ORDER — MIDAZOLAM HYDROCHLORIDE 1 MG/ML
INJECTION, SOLUTION INTRAMUSCULAR; INTRAVENOUS AS NEEDED
Status: DISCONTINUED | OUTPATIENT
Start: 2022-04-29 | End: 2022-04-29 | Stop reason: HOSPADM

## 2022-04-29 RX ORDER — ISOPROTERENOL HYDROCHLORIDE 0.2 MG/ML
INJECTION, SOLUTION INTRAMUSCULAR; INTRAVENOUS AS NEEDED
Status: DISCONTINUED | OUTPATIENT
Start: 2022-04-29 | End: 2022-04-29 | Stop reason: HOSPADM

## 2022-04-29 RX ORDER — OXYCODONE AND ACETAMINOPHEN 5; 325 MG/1; MG/1
1 TABLET ORAL
Status: DISCONTINUED | OUTPATIENT
Start: 2022-04-29 | End: 2022-04-29 | Stop reason: HOSPADM

## 2022-04-29 RX ORDER — SODIUM CHLORIDE 9 MG/ML
INJECTION, SOLUTION INTRAVENOUS
Status: DISCONTINUED | OUTPATIENT
Start: 2022-04-29 | End: 2022-04-29 | Stop reason: HOSPADM

## 2022-04-29 RX ORDER — PROPOFOL 10 MG/ML
VIAL (ML) INTRAVENOUS
Status: DISCONTINUED | OUTPATIENT
Start: 2022-04-29 | End: 2022-04-29 | Stop reason: HOSPADM

## 2022-04-29 RX ORDER — FLECAINIDE ACETATE 50 MG/1
50 TABLET ORAL 2 TIMES DAILY
Qty: 120 TABLET | Refills: 3 | Status: SHIPPED | OUTPATIENT
Start: 2022-04-29 | End: 2022-06-02 | Stop reason: SDUPTHER

## 2022-04-29 RX ORDER — OXYCODONE AND ACETAMINOPHEN 5; 325 MG/1; MG/1
1 TABLET ORAL
Qty: 12 TABLET | Refills: 0 | Status: SHIPPED | OUTPATIENT
Start: 2022-04-29 | End: 2022-05-02

## 2022-04-29 RX ORDER — HEPARIN SODIUM 200 [USP'U]/100ML
INJECTION, SOLUTION INTRAVENOUS
Status: COMPLETED | OUTPATIENT
Start: 2022-04-29 | End: 2022-04-29

## 2022-04-29 RX ORDER — FENTANYL CITRATE 50 UG/ML
INJECTION, SOLUTION INTRAMUSCULAR; INTRAVENOUS AS NEEDED
Status: DISCONTINUED | OUTPATIENT
Start: 2022-04-29 | End: 2022-04-29 | Stop reason: HOSPADM

## 2022-04-29 RX ORDER — LIDOCAINE HYDROCHLORIDE 10 MG/ML
INJECTION, SOLUTION EPIDURAL; INFILTRATION; INTRACAUDAL; PERINEURAL AS NEEDED
Status: DISCONTINUED | OUTPATIENT
Start: 2022-04-29 | End: 2022-04-29 | Stop reason: HOSPADM

## 2022-04-29 RX ADMIN — MIDAZOLAM HYDROCHLORIDE 2 MG: 2 INJECTION, SOLUTION INTRAMUSCULAR; INTRAVENOUS at 12:10

## 2022-04-29 RX ADMIN — SODIUM CHLORIDE: 9 INJECTION, SOLUTION INTRAVENOUS at 12:01

## 2022-04-29 RX ADMIN — FENTANYL CITRATE 50 MCG: 50 INJECTION, SOLUTION INTRAMUSCULAR; INTRAVENOUS at 12:10

## 2022-04-29 RX ADMIN — FENTANYL CITRATE 50 MCG: 50 INJECTION, SOLUTION INTRAMUSCULAR; INTRAVENOUS at 12:52

## 2022-04-29 RX ADMIN — PROPOFOL 25 MCG/KG/MIN: 10 INJECTION, EMULSION INTRAVENOUS at 12:10

## 2022-04-29 NOTE — ROUTINE PROCESS
TRANSFER - IN REPORT:    Verbal report received from Marvin, [facility] on [address]  being received from [facility] for routine post - op      Report consisted of patients Situation, Background, Assessment and   Recommendations(SBAR). Information from the following report(s) SBAR, Procedure Summary and MAR was reviewed with the receiving nurse. Opportunity for questions and clarification was provided. Assessment completed upon patients arrival to unit and care assumed.

## 2022-04-29 NOTE — Clinical Note
TRANSFER - OUT REPORT:     Verbal report given to: [employer]. Report consisted of patient's Situation, Background, Assessment and   Recommendations(SBAR). Opportunity for questions and clarification was provided.

## 2022-04-29 NOTE — ANESTHESIA POSTPROCEDURE EVALUATION
Procedure(s):  EP STUDY COMPLETE  LT ATRIAL PACE & RECORD DURING EP STUDY  DRUG STIMULATION. MAC    Anesthesia Post Evaluation      Multimodal analgesia: multimodal analgesia used between 6 hours prior to anesthesia start to PACU discharge  Patient location during evaluation: PACU  Patient participation: complete - patient participated  Level of consciousness: awake  Pain score: 1  Pain management: adequate  Airway patency: patent  Anesthetic complications: no  Cardiovascular status: acceptable  Respiratory status: acceptable  Hydration status: acceptable  Post anesthesia nausea and vomiting:  none  Final Post Anesthesia Temperature Assessment:  Normothermia (36.0-37.5 degrees C)      INITIAL Post-op Vital signs:   Vitals Value Taken Time   /72 04/29/22 1335   Temp 36.7 °C (98.1 °F) 04/29/22 1318   Pulse 72 04/29/22 1345   Resp 12 04/29/22 1345   SpO2 97 % 04/29/22 1345   Vitals shown include unvalidated device data.
4 = No assist / stand by assistance

## 2022-04-29 NOTE — PROGRESS NOTES
AVS Discharge instructions reviewed with patient and copy given to patient. All questions answered. Patient verbalized understanding to all discharge instructions. PIVs removed. Procedural site within normal limits. No hematoma or bleeding noted from procedural or PIV sites. No pain noted at discharge. Patient discharged with support person in stable condition. Escorted out to vehicle for transport home.

## 2022-04-29 NOTE — ROUTINE PROCESS
Patient ambulated from waiting area without difficulty, placed on monitor #1. ID, NPO status, allergies, home meds verified. PIV started x2, pt already had labwork.  Consents ready for signatures

## 2022-04-29 NOTE — ANESTHESIA PREPROCEDURE EVALUATION
Relevant Problems   No relevant active problems       Anesthetic History   No history of anesthetic complications            Review of Systems / Medical History  Patient summary reviewed and pertinent labs reviewed    Pulmonary  Within defined limits                 Neuro/Psych             Comments: syncope Cardiovascular      Valvular problems/murmurs: tricuspid insufficiency      Dysrhythmias : SVT      Exercise tolerance: >4 METS  Comments: EF 55%  MILD TI   GI/Hepatic/Renal  Within defined limits              Endo/Other        Arthritis     Other Findings              Physical Exam    Airway  Mallampati: II  TM Distance: > 6 cm  Neck ROM: normal range of motion   Mouth opening: Normal     Cardiovascular          Murmur: Grade 1, Tricuspid area     Dental  No notable dental hx       Pulmonary  Breath sounds clear to auscultation               Abdominal  GI exam deferred       Other Findings            Anesthetic Plan    ASA: 3  Anesthesia type: MAC          Induction: Intravenous  Anesthetic plan and risks discussed with: Patient

## 2022-04-29 NOTE — Clinical Note
TRANSFER - IN REPORT:     Verbal report received from: ORQUIDEA Jha. Report consisted of patient's Situation, Background, Assessment and   Recommendations(SBAR). Opportunity for questions and clarification was provided. Assessment completed upon patient's arrival to unit and care assumed.

## 2022-06-02 ENCOUNTER — OFFICE VISIT (OUTPATIENT)
Dept: CARDIOLOGY CLINIC | Age: 72
End: 2022-06-02
Payer: MEDICARE

## 2022-06-02 VITALS
RESPIRATION RATE: 16 BRPM | HEART RATE: 71 BPM | WEIGHT: 119 LBS | SYSTOLIC BLOOD PRESSURE: 112 MMHG | DIASTOLIC BLOOD PRESSURE: 62 MMHG | BODY MASS INDEX: 22.12 KG/M2 | OXYGEN SATURATION: 98 % | TEMPERATURE: 97.8 F

## 2022-06-02 DIAGNOSIS — I47.1 SVT (SUPRAVENTRICULAR TACHYCARDIA) (HCC): Primary | ICD-10-CM

## 2022-06-02 PROCEDURE — G8399 PT W/DXA RESULTS DOCUMENT: HCPCS | Performed by: INTERNAL MEDICINE

## 2022-06-02 PROCEDURE — 1123F ACP DISCUSS/DSCN MKR DOCD: CPT | Performed by: INTERNAL MEDICINE

## 2022-06-02 PROCEDURE — 1101F PT FALLS ASSESS-DOCD LE1/YR: CPT | Performed by: INTERNAL MEDICINE

## 2022-06-02 PROCEDURE — 3017F COLORECTAL CA SCREEN DOC REV: CPT | Performed by: INTERNAL MEDICINE

## 2022-06-02 PROCEDURE — G8536 NO DOC ELDER MAL SCRN: HCPCS | Performed by: INTERNAL MEDICINE

## 2022-06-02 PROCEDURE — 1090F PRES/ABSN URINE INCON ASSESS: CPT | Performed by: INTERNAL MEDICINE

## 2022-06-02 PROCEDURE — G8428 CUR MEDS NOT DOCUMENT: HCPCS | Performed by: INTERNAL MEDICINE

## 2022-06-02 PROCEDURE — 99214 OFFICE O/P EST MOD 30 MIN: CPT | Performed by: INTERNAL MEDICINE

## 2022-06-02 PROCEDURE — G8420 CALC BMI NORM PARAMETERS: HCPCS | Performed by: INTERNAL MEDICINE

## 2022-06-02 PROCEDURE — G9899 SCRN MAM PERF RSLTS DOC: HCPCS | Performed by: INTERNAL MEDICINE

## 2022-06-02 PROCEDURE — 93000 ELECTROCARDIOGRAM COMPLETE: CPT | Performed by: INTERNAL MEDICINE

## 2022-06-02 PROCEDURE — G8510 SCR DEP NEG, NO PLAN REQD: HCPCS | Performed by: INTERNAL MEDICINE

## 2022-06-02 RX ORDER — FLECAINIDE ACETATE 50 MG/1
50 TABLET ORAL 2 TIMES DAILY
Qty: 180 TABLET | Refills: 3 | Status: SHIPPED | OUTPATIENT
Start: 2022-06-02

## 2022-06-02 NOTE — LETTER
6/2/2022    Patient: Ion Looney   YOB: 1950   Date of Visit: 6/2/2022     Tommy Neves MD  [id number] [facility]  [address]  [address] [facility] [address]    Dear Tommy Neves MD,      Thank you for referring Ms. Apple Lechuga to [facility] for evaluation. My notes for this consultation are attached. If you have questions, please do not hesitate to call me. I look forward to following your patient along with you.       Sincerely,    Elroy Cook MD

## 2022-06-02 NOTE — PROGRESS NOTES
Identified pt with two pt identifiers(name and ). Reviewed record in preparation for visit and have obtained necessary documentation. Harley Palacios presents today for   Chief Complaint   Patient presents with    Follow-up       Pt denies DIZZINESS, SOB, CHEST PAIN/ PRESSURE, FATIGUE/WEAKNESS, HEADACHES, SWELLING. Harley Palacios preferred language for health care discussion is english/other. Personal Protective Equipment:   Personal Protective Equipment was used including: mask-surgical and hands-gloves. Patient was placed on no precaution(s). Patient was masked. Precautions:   Patient currently on None  Patient currently roomed with door closed. Is someone accompanying this pt? Yes,     Is the patient using any DME equipment during [address]? no    Depression Screening:  3 most recent PHQ Screens 2022   PHQ Not Done -   Little interest or pleasure in doing things Not at all   Feeling down, depressed, irritable, or hopeless Not at all   Total Score PHQ 2 0       Learning Assessment:  Learning Assessment 2022   PRIMARY LEARNER Patient   PRIMARY LANGUAGE ENGLISH   LEARNER PREFERENCE PRIMARY DEMONSTRATION     -     -   ANSWERED BY patient   RELATIONSHIP SELF       Abuse Screening:  Abuse Screening Questionnaire 2022   Do you ever feel afraid of your partner? N   Are you in a relationship with someone who physically or mentally threatens you? N   Is it safe for you to go home? Y       Fall Risk  Fall Risk Assessment, last 12 mths 2022   Able to walk? Yes   Fall in past 12 months? 0   Do you feel unsteady? 0   Are you worried about falling 0       Pt currently taking Anticoagulant therapy? no  Pt currently taking Antiplatelet therapy? no    Coordination of Care:  1. Have you been to the ER, urgent care clinic since your last visit? Hospitalized since your last visit? no    2.  Have you seen or consulted any other health care providers outside of the [address] [place] [address] since your last visit? Include any pap smears or colon screening. no      Please see Red banners under Allergies and Med Rec to remove outside inquires. All correct information has been verified with patient and added to chart.      Medication's patient's would liked removed has been marked not taking to be removed per Verbal order and read back per Dori Llamas MD

## 2022-06-02 NOTE — PROGRESS NOTES
[facility]    Ms. Haile Snyder is 74 y.o. female with a history of SVT / , ovarian cancer    Patient is here today for follow-up appointment. She denies any prior history of MI or CHF. Patient was diagnosed with narrow complex tachycardia by EKG in 07/25/2020. She was admitted to the hospital at the time and beta-blocker was started. She also had a echocardiogram and nuclear stress test in 7/2020  Patient underwent EP study in 04/2022 which showed short burst of atrial tachycardia at a rate of around 60 bpm after Isuprel infusion. Patient did not have any episode of palpitation or any SVT since last visit. Because of confusion, patient did not know that she was supposed to start flecainide. She is taking Coreg as prescribed. She denies presyncope or syncope  Denies any nausea, vomiting, abdominal pain, fever, chills, sputum production. No hematuria or other bleeding complaints    Past Medical History:   Diagnosis Date    Alopecia 1/13/2011    Arthritis     Halitosis 1/13/2011    Menopause     Ovarian cancer ([facility] [address]     02/1998    Restless leg syndrome     SVT (supraventricular tachycardia) ([facility])          Past Surgical History:   Procedure Laterality Date    HX CATARACT REMOVAL Right     HX GYN      HX HYSTERECTOMY         Current Outpatient Medications   Medication Sig    flecainide (TAMBOCOR) 50 mg tablet Take 1 Tablet by mouth two (2) times a day.  meloxicam (MOBIC) 7.5 mg tablet Take 7.5 mg by mouth daily.  carvediloL (COREG) 6.25 mg tablet TAKE 1 TABLET BY MOUTH TWICE DAILY WITH MEALS    diclofenac (VOLTAREN) 1 % gel Apply 2 g to affected area four (4) times daily. For hand pain    dextran 70-hypromellose (ARTIFICIAL TEARS,[id number],) ophthalmic solution Administer 1 Drop to both eyes as needed.  fexofenadine-pseudoephedrine (ALLEGRA-D 12 HOUR)  mg Tb12 Take 1 Tablet by mouth every twelve (12) hours.     polyethylene glycol (MIRALAX) 17 gram packet Take 1 Packet by mouth daily. (Patient not taking: Reported on 4/14/2022)     No current facility-administered medications for this visit. Allergies and Sensitivities:  No Known Allergies    Family History:  Family History   Problem Relation Age of Onset    Heart Disease Mother        Social History:  Social History     Tobacco Use    Smoking status: Never Smoker    Smokeless tobacco: Never Used   Vaping Use    Vaping Use: Never used   Substance Use Topics    Alcohol use: No    Drug use: No     She  reports that she has never smoked. She has never used smokeless tobacco.  She  reports no history of alcohol use. Review of Systems:  Cardiac symptoms as noted above in HPI. All others negative. Physical Exam:  BP Readings from Last 3 Encounters:   04/29/22 (!) 166/80   04/14/22 124/78   03/09/22 128/62         Pulse Readings from Last 3 Encounters:   04/29/22 84   04/14/22 63   03/01/22 70          Wt Readings from Last 3 Encounters:   04/29/22 53.5 kg (118 lb)   04/14/22 54.4 kg (120 lb)   03/09/22 54.9 kg (121 lb)       Constitutional: Oriented to person, place, and time. HENT: Head: Normocephalic and atraumatic. Neck: No JVD present. Carotid bruit is not appreciated. Cardiovascular: Regular rhythm. No murmur, gallop or rubs appreciated  Lung: Breath sounds normal. No respiratory distress. No ronchi or rales appreciated  Abdominal: No tenderness. No rebound and no guarding. Musculoskeletal: There is no lower extremity edema.  No cynosis      Review of Data  LABS:   Lab Results   Component Value Date/Time    Sodium 134 (L) 04/22/2022 11:14 AM    Potassium 4.7 04/22/2022 11:14 AM    Chloride 102 04/22/2022 11:14 AM    CO2 30 04/22/2022 11:14 AM    Glucose 95 04/22/2022 11:14 AM    BUN 15 04/22/2022 11:14 AM    Creatinine 0.84 04/22/2022 11:14 AM     Lipids Latest Ref Rng & Units 3/11/2022 7/26/2020 5/31/2018   Chol, Total <200 MG/ 197 182   HDL 40 - 60 MG/DL 83(H) 84(H) 78   LDL 0 - 100 MG/DL 99.8 101(H) 95   Trig <150 MG/DL 61 60 45   Chol/HDL Ratio 0 - 5.0   2.3 2.3 -   Some recent data might be hidden     Lab Results   Component Value Date/Time    ALT (SGPT) 30 04/22/2022 11:14 AM     Lab Results   Component Value Date/Time    Hemoglobin A1c 5.1 11/24/2015 03:48 PM       EKG    ECHO(073/2022)  Left Ventricle Left ventricle size is normal. Normal wall thickness. Normal wall motion. Normal left ventricular systolic function with a visually estimated EF of 55 - 60%. Diastolic function present with increased LAP with normal LV EF. Left Atrium Left atrium is moderately dilated. Left atrial volume index is moderately increased (42-48 mL/m2). Right Ventricle Right ventricle size is normal. Normal systolic function. TAPSE is normal.   Right Atrium Right atrium size is normal.   Aortic Valve Valve structure is normal. Trace transvalvular regurgitation. No stenosis. Mitral Valve Mildly thickened leaflet. Mild leaflet prolapse noted of the anterior leaflet (A1). Trace transvalvular regurgitation. No stenosis noted. Tricuspid Valve Valve structure is normal. Mild transvalvular regurgitation. No stenosis noted. Pulmonic Valve Valve structure is normal. No transvalvular regurgitation. No stenosis noted. Aorta Normal sized annulus, sinus of Valsalva, ascending aorta and aortic arch. IVC/Hepatic Veins IVC diameter is less than or equal to 21 mm and decreases greater than 50% during inspiration; therefore the estimated right atrial pressure is normal (~3 mmHg). STRESS TEST (07/20)  · Baseline ECG: Normal EKG, non-specific ST-T wave abnormalities. · Gated SPECT: Left ventricular function post-stress was normal. Calculated ejection fraction is 61%. · There was no convincing evidence of significant reversible defect to suggest on going major ischemia. No significant fixed defect.   · Myocardial perfusion imaging supports a low risk stress test.    CATHETERIZATION    IMPRESSION & PLAN:  Ms. Haile Snyder is 74 y.o. female    Atrial tachycardia / SVT/ narrow complex tachycardia  Diagnosed in July 2020 by EKG. Heart rate at the time was 200 bpm.  Responsive to adenosine to sinus rhythm  Patient had 2-3 episode of tachycardia with heart rate around 180-200 bpm at home over last few weeks despite being on beta-blocker. EPS in 04/2022 as below  -Inducible A.tach 160-190 bpm, unable to map due to nonsutained episodes only despite isuprel.  -There was no evidence of persistent atrial flutter, accessory pathway, or AVNRT. -Plan to restart beta-blocker and start flecainide 50 mg twice daily since it appears to be an atrial tachycardia. Because of confusion, she has not started flecainide since EP study. I will ask patient to start taking flecainide 50 mg twice daily. Side effect discussed. Continue carvedilol. Exam and by EKG, she is in sinus rhythm today    Hypertension:  /62 today. Currently on Coreg    Importance of diet and exercise was discussed with patient. This plan was discussed with patient who is in agreement. Thank you for allowing me to participate in patient care. Please feel free to call me if you have any question or concern. Jonathon Cho MD  Please note: This document has been produced using voice recognition software. Unrecognized errors in transcription may be present.

## 2022-06-10 ENCOUNTER — TRANSCRIBE ORDER (OUTPATIENT)
Dept: SCHEDULING | Age: 72
End: 2022-06-10

## 2022-06-10 DIAGNOSIS — Z12.31 VISIT FOR SCREENING MAMMOGRAM: Primary | ICD-10-CM

## 2022-06-16 ENCOUNTER — HOSPITAL ENCOUNTER (OUTPATIENT)
Dept: WOMENS IMAGING | Age: 72
Discharge: HOME | End: 2022-06-16
Attending: FAMILY MEDICINE
Payer: MEDICARE

## 2022-06-16 DIAGNOSIS — Z12.31 VISIT FOR SCREENING MAMMOGRAM: ICD-10-CM

## 2022-06-16 PROCEDURE — 77063 BREAST TOMOSYNTHESIS BI: CPT

## 2022-08-08 ENCOUNTER — TELEPHONE (OUTPATIENT)
Dept: CARDIOLOGY CLINIC | Age: 72
End: 2022-08-08

## 2022-08-08 NOTE — TELEPHONE ENCOUNTER
Patient taking flecainide and carvedilol. Patient states that it causes her to be dizzy and her BP to be very low. Patient was prescribed the flecainide after her ablation and had not taken if for a month after the ablation. Since being on it she is not feeling well. Patient recorded and BP of 92/53 after taking both and a heart rate 62. Patient says that she decided to only take one of the flecainide pills. Wants to know if she should continue taking this med or stop it all together. Even though at home machine says she is okay she still feels like it is low. Before flecainide  patient bp was 113/68 hr 68. Patient would like to know what to do moving froward.  Needs to know if she should continue this medication

## 2022-08-09 NOTE — TELEPHONE ENCOUNTER
Contacted pt at [facility]. Two patient Identifiers confirmed. Advised pt per RUPAL Matute. Coreg dosage updated in pt chart. Pt verbalized understanding.

## 2022-08-09 NOTE — TELEPHONE ENCOUNTER
Verbal order and read back per Malini Pereira PA-C    Continue Flecainide as prescribed. Decrease Coreg to 3.125mg twice daily. Hold for SBP<105 mmHg. Instruct pt to take BP before taking Coreg and call back for any low Bps. If new or worsening symptoms begin, advise pt to go to ER.

## 2022-08-23 RX ORDER — CARVEDILOL 6.25 MG/1
TABLET ORAL
Qty: 180 TABLET | Refills: 3 | Status: SHIPPED | OUTPATIENT
Start: 2022-08-23

## 2022-12-02 ENCOUNTER — OFFICE VISIT (OUTPATIENT)
Dept: FAMILY MEDICINE CLINIC | Age: 72
End: 2022-12-02
Payer: MEDICARE

## 2022-12-02 VITALS
SYSTOLIC BLOOD PRESSURE: 118 MMHG | OXYGEN SATURATION: 97 % | HEIGHT: 61 IN | TEMPERATURE: 97.8 F | BODY MASS INDEX: 20.86 KG/M2 | DIASTOLIC BLOOD PRESSURE: 60 MMHG | WEIGHT: 110.5 LBS | HEART RATE: 76 BPM | RESPIRATION RATE: 16 BRPM

## 2022-12-02 DIAGNOSIS — Z23 NEEDS FLU SHOT: ICD-10-CM

## 2022-12-02 DIAGNOSIS — J30.89 CHRONIC NONSEASONAL ALLERGIC RHINITIS DUE TO POLLEN: ICD-10-CM

## 2022-12-02 DIAGNOSIS — Z86.79 S/P ABLATION OF VENTRICULAR ARRHYTHMIA: ICD-10-CM

## 2022-12-02 DIAGNOSIS — Z00.00 MEDICARE ANNUAL WELLNESS VISIT, SUBSEQUENT: Primary | ICD-10-CM

## 2022-12-02 DIAGNOSIS — Z98.890 S/P ABLATION OF VENTRICULAR ARRHYTHMIA: ICD-10-CM

## 2022-12-02 DIAGNOSIS — Z12.11 COLON CANCER SCREENING: ICD-10-CM

## 2022-12-02 DIAGNOSIS — M15.9 PRIMARY OSTEOARTHRITIS INVOLVING MULTIPLE JOINTS: ICD-10-CM

## 2022-12-02 RX ORDER — BACLOFEN 10 MG/1
10 TABLET ORAL 3 TIMES DAILY
Qty: 90 TABLET | Refills: 1
Start: 2022-12-02

## 2022-12-02 RX ORDER — FEXOFENADINE HCL AND PSEUDOEPHEDRINE HCI 60; 120 MG/1; MG/1
1 TABLET, EXTENDED RELEASE ORAL EVERY 12 HOURS
Qty: 180 TABLET | Refills: 3 | Status: SHIPPED | OUTPATIENT
Start: 2022-12-02

## 2022-12-02 NOTE — PROGRESS NOTES
Jon Man is a 74 y.o. female (: 1950) presenting to address:    No chief complaint on file. There were no vitals filed for this visit. Hearing/Vision:   No results found. Learning Assessment:     Learning Assessment 2022   PRIMARY LEARNER Patient   PRIMARY LANGUAGE ENGLISH   LEARNER PREFERENCE PRIMARY DEMONSTRATION     -     -   ANSWERED BY patient   RELATIONSHIP SELF     Depression Screening:     3 most recent PHQ Screens 2022   PHQ Not Done -   Little interest or pleasure in doing things Not at all   Feeling down, depressed, irritable, or hopeless Not at all   Total Score PHQ 2 0     Fall Risk Assessment:     Fall Risk Assessment, last 12 mths 2022   Able to walk? Yes   Fall in past 12 months? 0   Do you feel unsteady? 0   Are you worried about falling 0     Abuse Screening:     Abuse Screening Questionnaire 2022   Do you ever feel afraid of your partner? N   Are you in a relationship with someone who physically or mentally threatens you? N   Is it safe for you to go home? Y     ADL Assessment:     ADL Assessment 2018   Feeding yourself No Help Needed   Getting from bed to chair No Help Needed   Getting dressed No Help Needed   Bathing or showering No Help Needed   Walk across the room (includes cane/walker) No Help Needed   Using the telphone No Help Needed   Taking your medications No Help Needed   Preparing meals No Help Needed   Managing money (expenses/bills) No Help Needed   Moderately strenuous housework (laundry) No Help Needed   Shopping for personal items (toiletries/medicines) No Help Needed   Shopping for groceries No Help Needed   Driving No Help Needed   Climbing a flight of stairs No Help Needed   Getting to places beyond walking distances No Help Needed        Coordination of Care Questionaire:     1. \"Have you been to the ER, urgent care clinic since your last visit? Hospitalized since your last visit? \" No    2.  \"Have you seen or consulted any other health care providers outside of the [address] [place] [address] since your last visit? \" No     3. For patients aged 39-70: Has the patient had a colonoscopy / FIT/ Cologuard? No      If the patient is female:    4. For patients aged 41-77: Has the patient had a mammogram within the past 2 years? Yes - no Care Gap present      5. For patients aged 21-65: Has the patient had a pap smear?  NA - based on age or sex

## 2022-12-02 NOTE — PROGRESS NOTES
*ATTENTION:  This note has been created by a medical student for educational purposes only. Please do not refer to the content of this note for clinical decision-making, billing, or other purposes. Please see attending physicians note to obtain clinical information on this patient. *   Parris Walls is a [address] [place] [address] who presents to the clinic today for    Chief Complaint   Patient presents with    Annual Wellness Visit    Immunization/Injection      Subjective:  HPI: Patient presents today for a flu shot, patient feels well today without any acute complaints. The patient stated she had a cardiac ablation in April of this year for SVT. The patient feels the procedure went well, she states she has no cardiovascular symptoms other than occasional episodes of tachycardia lasting up to 5 minutes that resolve spontaneously. The patient states she feels shortness of breath after climbing her stairs at home, denies any respiratory problems in the clinic today. The patient states she has non-seasonal allergies from dust in her home and pollen outside, states it is well controlled with 12-hour Allegra. Review of Systems   Constitutional:  Negative for chills, diaphoresis, fever, malaise/fatigue and weight loss. HENT:  Negative for congestion, ear pain, hearing loss and sore throat. Eyes:  Negative for blurred vision and pain. Respiratory:  Negative for cough, hemoptysis, sputum production, shortness of breath and wheezing. Cardiovascular:  Negative for chest pain, palpitations, orthopnea and leg swelling. Gastrointestinal:  Negative for abdominal pain, constipation, diarrhea, heartburn, nausea and vomiting. Genitourinary:  Negative for dysuria, frequency and urgency. Musculoskeletal:  Positive for back pain and joint pain. Negative for falls and myalgias. Skin:  Negative for itching and rash.    Neurological:  Negative for dizziness, tingling, tremors, sensory change, speech change, focal weakness, seizures, weakness and headaches. Endo/Heme/Allergies:  Positive for environmental allergies. Negative for polydipsia. Does not bruise/bleed easily. Psychiatric/Behavioral:  Negative for depression, hallucinations, substance abuse and suicidal ideas. The patient is not nervous/anxious and does not have insomnia. Objective  Visit Vitals  /60 (BP 1 Location: Left upper arm, BP Patient Position: Sitting)   Pulse 76   Temp 97.8 °F (36.6 °C) (Temporal)   Resp 16   Ht 5' 1\" (1.549 m)   Wt 110 lb 8 oz (50.1 kg)   SpO2 97%   BMI 20.88 kg/m²      Physical Exam  Constitutional:       Appearance: Normal appearance. She is normal weight. HENT:      Head: Normocephalic and atraumatic. Right Ear: Tympanic membrane, ear canal and external ear normal.      Left Ear: Tympanic membrane, ear canal and external ear normal.      Nose: Nose normal.      Mouth/Throat:      Mouth: Mucous membranes are moist.      Pharynx: Oropharynx is clear. Eyes:      Extraocular Movements: Extraocular movements intact. Conjunctiva/sclera: Conjunctivae normal.      Pupils: Pupils are equal, round, and reactive to light. Cardiovascular:      Rate and Rhythm: Normal rate and regular rhythm. Pulses: Normal pulses. Heart sounds: Normal heart sounds. Pulmonary:      Effort: Pulmonary effort is normal.      Breath sounds: Normal breath sounds. Abdominal:      General: Abdomen is flat. Bowel sounds are normal.      Palpations: Abdomen is soft. Musculoskeletal:         General: Normal range of motion. Cervical back: Normal range of motion and neck supple. Skin:     General: Skin is warm and dry. Neurological:      General: No focal deficit present. Mental Status: She is alert and oriented to person, place, and time. Mental status is at baseline. Psychiatric:         Mood and Affect: Mood normal.         Behavior: Behavior normal.         Thought Content:  Thought content normal. Judgment: Judgment normal.     Assessment/Plan:  1. Medicare annual wellness visit, subsequent    2. Chronic nonseasonal allergic rhinitis due to pollen  Cont 12- hour Allegra 60mg-120mg as needed. 3. S/P ablation of ventricular arrhythmia  Received Ablation earlier this year in April, patient prefers to have cardiologist closer to home.  - REFERRAL TO CARDIOLOGY    4. Needs flu shot  Will be administered today. - INFLUENZA, FLUAD, (AGE 65 Y+), IM, PF, 0.5 ML  - ADMIN INFLUENZA VIRUS VAC    5. Primary osteoarthritis involving multiple joints  Patient followed by rheumatology, receives injections. - baclofen (LIORESAL) 10 mg tablet; Take 1 Tablet by mouth three (3) times daily. Dispense: 90 Tablet; Refill: 1    6. Colon cancer screening  Patient prefers not to have colonoscopy, will be sent home with occult blood kit. - OCCULT BLOOD IMMUNOASSAY,DIAGNOSTIC; Future       Rosangela Frausto, MS-3    *ATTENTION:  This note has been created by a medical student for educational purposes only. Please do not refer to the content of this note for clinical decision-making, billing, or other purposes. Please see attending physicians note to obtain clinical information on this patient. *

## 2022-12-03 NOTE — PROGRESS NOTES
(AWV) The Medicare Annual Wellness Exam PROGRESS NOTE    This is a Medicare Annual Wellness Exam (AWV)     I have reviewed the patient's medical history in detail and updated the computerized patient record. Marquis Macias is a 74 y.o.  female and presents for an annual wellness exam     ROS   Constitutional:  Negative for chills, diaphoresis, fever, malaise/fatigue and weight loss. HENT:  Negative for congestion, ear pain, hearing loss and sore throat. Eyes:  Negative for blurred vision and pain. Respiratory:  Negative for cough, hemoptysis, sputum production, shortness of breath and wheezing. Cardiovascular:  Negative for chest pain, palpitations, orthopnea and leg swelling. Gastrointestinal:  Negative for abdominal pain, constipation, diarrhea, heartburn, nausea and vomiting. Genitourinary:  Negative for dysuria, frequency and urgency. Musculoskeletal:  Positive for back pain and joint pain. Negative for falls and myalgias. Skin:  Negative for itching and rash. Neurological:  Negative for dizziness, tingling, tremors, sensory change, speech change, focal weakness, seizures, weakness and headaches. Endo/Heme/Allergies:  Positive for environmental allergies. Negative for polydipsia. Does not bruise/bleed easily. Psychiatric/Behavioral:  Negative for depression, hallucinations, substance abuse and suicidal ideas. The patient is not nervous/anxious and does not have insomnia. All other systems reviewed and are negative.     History     Past Medical History:   Diagnosis Date    Alopecia 1/13/2011    Arthritis     Halitosis 1/13/2011    Menopause     Ovarian cancer ([facility] [address]     02/1998    Restless leg syndrome     SVT (supraventricular tachycardia) (HCC)     EPS suggested atrial tachycardia 04/22      Past Surgical History:   Procedure Laterality Date    HX CATARACT REMOVAL Right     HX GYN      HX HYSTERECTOMY       Current Outpatient Medications   Medication Sig Dispense Refill fexofenadine-pseudoephedrine (ALLEGRA-D)  mg Tb12 Take 1 Tablet by mouth every twelve (12) hours. 180 Tablet 3    baclofen (LIORESAL) 10 mg tablet Take 1 Tablet by mouth three (3) times daily. 90 Tablet 1    carvediloL (COREG) 6.25 mg tablet TAKE 1 TABLET TWICE A DAY WITH MEALS (DISCONTINUE METOPROLOL) 180 Tablet 3    flecainide (TAMBOCOR) 50 mg tablet Take 1 Tablet by mouth two (2) times a day. 180 Tablet 3    diclofenac (VOLTAREN) 1 % gel Apply 2 g to affected area four (4) times daily. For hand pain 100 g 12    dextran 70-hypromellose (ARTIFICIAL TEARS) ophthalmic solution Administer 1 Drop to both eyes as needed. polyethylene glycol (MIRALAX) 17 gram packet Take 1 Packet by mouth daily. 100 Each 12     Allergies   Allergen Reactions    House Dust Other (comments)     Family History   Problem Relation Age of Onset    Heart Disease Mother      Social History     Tobacco Use    Smoking status: Never    Smokeless tobacco: Never   Substance Use Topics    Alcohol use: No     Patient Active Problem List   Diagnosis Code    Ovarian cancer ([facility] [address] C56.9    Alopecia L65.9    Primary osteoarthritis involving multiple joints M15.9    SVT (supraventricular tachycardia) ([facility]) I47.1       Health Maintenance History  Immunizations reviewed, dtap up to date , pneumovax up to date, flu due, zoster due  colonoscopy: due but pt opts for FIT,   Eye exam:due  Mammo up to date  Dexascan up to date    Depression Risk Factor Screening:      Patient Health Questionnaire (PHQ-2)   Over the last 2 weeks, how often have you been bothered by any of the following problems? Little interest or pleasure in doing things? Not at all. [0]  Feeling down, depressed, or hopeless? Several days. [1]    Total Score: 1/6  PHQ-2 Assessment Scoring:   A score of 2 or more requires further screening with the PHQ-9    Alcohol Risk Factor Screening:     Women:  On any occasion during the past 3 months, have you had more than 3 drinks containing alcohol? no   Do you average more than 7 drinks per week? no    Functional Ability and Level of Safety:     Hearing Loss    Hearing is good. Activities of Daily Living   Self-care. Requires assistance with: no ADLs    Fall Risk   Secondary diagnoses (15 pts)  Score: 15    Abuse Screen   Patient is not abused    Examination   Physical Examination  Vitals:    12/02/22 1119 12/02/22 1121   BP: (!) 165/74 118/60   Pulse: 76    Resp: 16    Temp: 97.8 °F (36.6 °C)    TempSrc: Temporal    SpO2: 97%    Weight: 110 lb 8 oz (50.1 kg)    Height: 5' 1\" (1.549 m)       Body mass index is 20.88 kg/m². Evaluation of Cognitive Function:  Mood/affect:good mood  Appearance:well kempt  Family member/caregiver input:  is worried about her joint pain    alert, well appearing, and in no distress, oriented to person, place, and time, and normal appearing weight    Patient Care Team:  Cm Carter MD as PCP - General (Family Medicine)  Cm Carter MD as PCP - [facility] Empaneled Provider  Michelle Bazan MD (Ophthalmology)    End-of-life planning  Advanced Directive in the case than an injury or illness causes the patient to be unable to make health care decisions    Health Care Directive or Living Will: yes    Advice/Referrals/Counselling/Plan:   Education and counseling provided:  Influenza Vaccine  Colorectal cancer screening tests  Include in education list (weight loss, physical activity, smoking cessation, fall prevention, and nutrition)    ICD-10-CM ICD-9-CM    1. Medicare annual wellness visit, subsequent  Z00.00 V70.0       2. Chronic nonseasonal allergic rhinitis due to pollen  J30.89 477.0       3. S/P ablation of ventricular arrhythmia  Z98.890 V45.89 REFERRAL TO CARDIOLOGY    Z86.79        4. Needs flu shot  Z23 V04.81 INFLUENZA, FLUAD, (AGE 65 Y+), IM, PF, 0.5 ML      ADMIN INFLUENZA VIRUS VAC      5.  Primary osteoarthritis involving multiple joints  M15.9 715.98 baclofen (LIORESAL) 10 mg tablet 6. Colon cancer screening  Z12.11 V76.51 OCCULT BLOOD IMMUNOASSAY,DIAGNOSTIC      . Brief written plan, checklist    I have discussed the diagnosis with the patient and the intended plan as seen in the above orders. The patient has received an after-visit summary and questions were answered concerning future plans. I have discussed medication side effects and warnings with the patient as well. I have reviewed the plan of care with the patient, accepted their input and they are in agreement with the treatment goals. ____________________________________________________________    Problem Assessment    for treatment of   Chief Complaint   Patient presents with    Annual Wellness Visit    Immunization/Injection         SUBJECTIVE    Well Adult Physical   Patient here for a comprehensive physical exam.The patient reports problems - h/o SVT s/p ablation; osteoarthritis  Do you take any herbs or supplements that were not prescribed by a doctor? yes Are you taking calcium supplements? yes Are you taking aspirin daily? not applicable  She requests a flu shot, patient feels well today without any acute complaints. The patient stated she had a cardiac ablation in April of this year for SVT. The patient feels the procedure went well, she states she has no cardiovascular symptoms other than occasional episodes of tachycardia lasting up to 5 minutes that resolve spontaneously. The patient states she feels shortness of breath after climbing her stairs at home, denies any respiratory problems in the clinic today. The patient states she has non-seasonal allergies from dust in her home and pollen outside, states it is well controlled with 12-hour Allegra. Osteoarthritis and Chronic Pain:  Patient has osteoarthritis, primarily affecting the back, hips, and knees. Symptoms onset: problem is longstanding.   Rheumatological ROS: ongoing significant pain in back, hips, and knees which is stable and controlled by PRN meds.   Response to treatment plan: waxing and waning. Constitutional:       Appearance: Normal appearance. She is normal weight. HENT:      Head: Normocephalic and atraumatic. Right Ear: Tympanic membrane, ear canal and external ear normal.      Left Ear: Tympanic membrane, ear canal and external ear normal.      Nose: Nose normal.      Mouth/Throat:      Mouth: Mucous membranes are moist.      Pharynx: Oropharynx is clear. Eyes:      Extraocular Movements: Extraocular movements intact. Conjunctiva/sclera: Conjunctivae normal.      Pupils: Pupils are equal, round, and reactive to light. Cardiovascular:      Rate and Rhythm: Normal rate and regular rhythm. Pulses: Normal pulses. Heart sounds: Normal heart sounds. Pulmonary:      Effort: Pulmonary effort is normal.      Breath sounds: Normal breath sounds. Abdominal:      General: Abdomen is flat. Bowel sounds are normal.      Palpations: Abdomen is soft. Musculoskeletal:         General: Normal range of motion. Cervical back: Normal range of motion and neck supple. Skin:     General: Skin is warm and dry. Neurological:      General: No focal deficit present. Mental Status: She is alert and oriented to person, place, and time. Mental status is at baseline. Psychiatric:         Mood and Affect: Mood normal.         Behavior: Behavior normal.         Thought Content: Thought content normal.         Judgment: Judgment normal.     LABS     TESTS      Assessment/Plan:    1. Medicare annual wellness visit, subsequent  Reviewed preventive recommendations    2. Chronic nonseasonal allergic rhinitis due to pollen  Nasal steroid and antihistamine    3. S/P ablation of ventricular arrhythmia  Requesting new cardiologist  - REFERRAL TO CARDIOLOGY    4. Needs flu shot    - INFLUENZA, FLUAD, (AGE 65 Y+), IM, PF, 0.5 ML  - ADMIN INFLUENZA VIRUS VAC    5.  Primary osteoarthritis involving multiple joints  Followed by Dr. Kendal Bob, rheumatologist; she has been prescribed muscle relaxer; s/p corticosteroid injection  - baclofen (LIORESAL) 10 mg tablet; Take 1 Tablet by mouth three (3) times daily. Dispense: 90 Tablet; Refill: 1    6.  Colon cancer screening    - OCCULT BLOOD IMMUNOASSAY,DIAGNOSTIC; Future    Lab review: orders written for new lab studies as appropriate; see orders

## 2022-12-06 LAB
FECAL OCCULT BLOOD: NEGATIVE
LOT EXP DATE, LEX: NORMAL
NEG QC STOOL: NORMAL
OCCULT BLOOD RESULT,SOB: NEGATIVE
POS QC STOOL,PQC: NORMAL

## 2022-12-15 ENCOUNTER — OFFICE VISIT (OUTPATIENT)
Dept: CARDIOLOGY CLINIC | Age: 72
End: 2022-12-15
Payer: MEDICARE

## 2022-12-15 VITALS
HEART RATE: 80 BPM | WEIGHT: 114 LBS | SYSTOLIC BLOOD PRESSURE: 113 MMHG | DIASTOLIC BLOOD PRESSURE: 65 MMHG | BODY MASS INDEX: 21.54 KG/M2

## 2022-12-15 DIAGNOSIS — I10 ESSENTIAL HYPERTENSION WITH GOAL BLOOD PRESSURE LESS THAN 140/90: Primary | ICD-10-CM

## 2022-12-15 DIAGNOSIS — I47.1 ATRIAL TACHYCARDIA (HCC): ICD-10-CM

## 2022-12-15 NOTE — LETTER
12/15/2022    Patient: Alesha Wadsworth   YOB: 1950   Date of Visit: 12/15/2022     López Alba MD  [id number] [facility]  [address] [place] [address] [address]  [address] [facility] [address]    Dear López Alba MD,      Thank you for referring Ms. Pradeep Edmonds to [facility] for evaluation. My notes for this consultation are attached. If you have questions, please do not hesitate to call me. I look forward to following your patient along with you.       Sincerely,    Zara Crockett MD

## 2022-12-15 NOTE — PROGRESS NOTES
[facility]    Ms. Sharon Gillespie is 74 y.o. female with a history of SVT / , ovarian cancer    Patient is here today for follow-up appointment. She denies any prior history of MI or CHF. Patient was diagnosed with narrow complex tachycardia by EKG in 07/25/2020. She was admitted to the hospital at the time and beta-blocker was started. She also had a echocardiogram and nuclear stress test in 7/2020  Patient underwent EP study in 04/2022 which showed short burst of atrial tachycardia  after Isuprel infusion. Patient was started on metoprolol and also flecainide with presumed atrial tachycardia    Patient denies any specific cardiac complaint. Her main concern is fatigue and tired. She denies any chest pain or chest tightness. She is taking her medication without any side effect. No presyncope or syncope. Denies any nausea, vomiting, abdominal pain, fever, chills, sputum production. No hematuria or other bleeding complaints    Past Medical History:   Diagnosis Date    Alopecia 1/13/2011    Arthritis     Halitosis 1/13/2011    Menopause     Ovarian cancer ([facility] [address]     02/1998    Restless leg syndrome     SVT (supraventricular tachycardia) (HCC)     EPS suggested atrial tachycardia 04/22         Past Surgical History:   Procedure Laterality Date    HX CATARACT REMOVAL Right     HX GYN      HX HYSTERECTOMY         Current Outpatient Medications   Medication Sig    carvediloL (COREG) 6.25 mg tablet TAKE 1 TABLET TWICE A DAY WITH MEALS (DISCONTINUE METOPROLOL)    fexofenadine-pseudoephedrine (ALLEGRA-D)  mg Tb12 Take 1 Tablet by mouth every twelve (12) hours. baclofen (LIORESAL) 10 mg tablet Take 1 Tablet by mouth three (3) times daily. flecainide (TAMBOCOR) 50 mg tablet Take 1 Tablet by mouth two (2) times a day. diclofenac (VOLTAREN) 1 % gel Apply 2 g to affected area four (4) times daily.  For hand pain    dextran 70-hypromellose (ARTIFICIAL TEARS) ophthalmic solution Administer 1 Drop to both eyes as needed. polyethylene glycol (MIRALAX) 17 gram packet Take 1 Packet by mouth daily. No current facility-administered medications for this visit. Allergies and Sensitivities:  Allergies   Allergen Reactions    House Dust Other (comments)       Family History:  Family History   Problem Relation Age of Onset    Heart Disease Mother        Social History:  Social History     Tobacco Use    Smoking status: Never    Smokeless tobacco: Never   Vaping Use    Vaping Use: Never used   Substance Use Topics    Alcohol use: No    Drug use: No     She  reports that she has never smoked. She has never used smokeless tobacco.  She  reports no history of alcohol use. Review of Systems:  Cardiac symptoms as noted above in HPI. All others negative. Physical Exam:  BP Readings from Last 3 Encounters:   12/02/22 118/60   06/02/22 112/62   04/29/22 (!) 166/80         Pulse Readings from Last 3 Encounters:   12/02/22 76   06/02/22 71   04/29/22 84          Wt Readings from Last 3 Encounters:   12/02/22 50.1 kg (110 lb 8 oz)   06/02/22 54 kg (119 lb)   04/29/22 53.5 kg (118 lb)       Constitutional: Oriented to person, place, and time. HENT: Head: Normocephalic and atraumatic. Neck: No JVD present. Carotid bruit is not appreciated. Cardiovascular: Regular rhythm. No murmur, gallop or rubs appreciated  Lung: Breath sounds normal. No respiratory distress. No ronchi or rales appreciated  Abdominal: No tenderness. No rebound and no guarding. Musculoskeletal: There is no lower extremity edema.  No cynosis      Review of Data  LABS:   Lab Results   Component Value Date/Time    Sodium 134 (L) 04/22/2022 11:14 AM    Potassium 4.7 04/22/2022 11:14 AM    Chloride 102 04/22/2022 11:14 AM    CO2 30 04/22/2022 11:14 AM    Glucose 95 04/22/2022 11:14 AM    BUN 15 04/22/2022 11:14 AM    Creatinine 0.84 04/22/2022 11:14 AM     Lipids Latest Ref Rng & Units 3/11/2022 7/26/2020   Chol, Total <200 MG/ 197   HDL 40 - 60 MG/DL 83(H) 84(H)   LDL 0 - 100 MG/DL 99.8 101(H)   Trig <150 MG/DL 61 60   Chol/HDL Ratio 0 - 5.0   2.3 2.3   Some recent data might be hidden     Lab Results   Component Value Date/Time    ALT (SGPT) 30 04/22/2022 11:14 AM     Lab Results   Component Value Date/Time    Hemoglobin A1c 5.1 11/24/2015 03:48 PM       EKG    ECHO(03/2022)  Left Ventricle Left ventricle size is normal. Normal wall thickness. Normal wall motion. Normal left ventricular systolic function with a visually estimated EF of 55 - 60%. Diastolic function present with increased LAP with normal LV EF. Left Atrium Left atrium is moderately dilated. Left atrial volume index is moderately increased (42-48 mL/m2). Right Ventricle Right ventricle size is normal. Normal systolic function. TAPSE is normal.   Right Atrium Right atrium size is normal.   Aortic Valve Valve structure is normal. Trace transvalvular regurgitation. No stenosis. Mitral Valve Mildly thickened leaflet. Mild leaflet prolapse noted of the anterior leaflet (A1). Trace transvalvular regurgitation. No stenosis noted. Tricuspid Valve Valve structure is normal. Mild transvalvular regurgitation. No stenosis noted. Pulmonic Valve Valve structure is normal. No transvalvular regurgitation. No stenosis noted. Aorta Normal sized annulus, sinus of Valsalva, ascending aorta and aortic arch. IVC/Hepatic Veins IVC diameter is less than or equal to 21 mm and decreases greater than 50% during inspiration; therefore the estimated right atrial pressure is normal (~3 mmHg). STRESS TEST (07/20)  Baseline ECG: Normal EKG, non-specific ST-T wave abnormalities. Gated SPECT: Left ventricular function post-stress was normal. Calculated ejection fraction is 61%. There was no convincing evidence of significant reversible defect to suggest on going major ischemia. No significant fixed defect.   Myocardial perfusion imaging supports a low risk stress test.    CATHETERIZATION    IMPRESSION & PLAN:  Ms. Tess Markham is 74 y.o. female    Atrial tachycardia / SVT/ narrow complex tachycardia  Diagnosed in July 2020 by EKG. Heart rate at the time was 200 bpm.  Responsive to adenosine to sinus rhythm  Patient had 2-3 episode of tachycardia with heart rate around 180-200 bpm at home over last few weeks despite being on beta-blocker. EPS in 04/2022 as below  -Inducible A.tach 160-190 bpm, unable to map due to nonsutained episodes only despite isuprel.  -There was no evidence of persistent atrial flutter, accessory pathway, or AVNRT. -Plan to restart beta-blocker and start flecainide 50 mg twice daily since it appears to be an atrial tachycardia. Continue carvedilol and flecainide  Would consider stress test as patient is on flecainide to rule out any ischemia in the next 6-month. Last stress test was in 07/2020 with no evidence of ischemia    Hypertension: /65 today. Currently on Coreg    Importance of diet and exercise was discussed with patient. This plan was discussed with patient who is in agreement. Thank you for allowing me to participate in patient care. Please feel free to call me if you have any question or concern. Christy German MD  Please note: This document has been produced using voice recognition software. Unrecognized errors in transcription may be present.

## 2022-12-15 NOTE — PROGRESS NOTES
Identified pt with two pt identifiers(name and ). Reviewed record in preparation for visit and have obtained necessary documentation. Shireen Duval presents today for   Chief Complaint   Patient presents with    Follow-up       Pt c/o MUSTAFA, LE edema. Shireen Duval preferred language for health care discussion is english/other. Personal Protective Equipment:   Personal Protective Equipment was used including: mask-surgical and hands-gloves. Patient was placed on no precaution(s). Patient was masked. Precautions:   Patient currently on None  Patient currently roomed with door closed. Is someone accompanying this pt? Yes,     Is the patient using any DME equipment during [address]? no    Depression Screening:  3 most recent PHQ Screens 2022   PHQ Not Done -   Little interest or pleasure in doing things Not at all   Feeling down, depressed, irritable, or hopeless Not at all   Total Score PHQ 2 0       Learning Assessment:  Learning Assessment 2022   PRIMARY LEARNER Patient   PRIMARY LANGUAGE ENGLISH   LEARNER PREFERENCE PRIMARY DEMONSTRATION     -     -   ANSWERED BY patient   RELATIONSHIP SELF       Abuse Screening:  Abuse Screening Questionnaire 2022   Do you ever feel afraid of your partner? N   Are you in a relationship with someone who physically or mentally threatens you? N   Is it safe for you to go home? Y       Fall Risk  Fall Risk Assessment, last 12 mths 2022   Able to walk? Yes   Fall in past 12 months? 0   Do you feel unsteady? 0   Are you worried about falling 0       Pt currently taking Anticoagulant therapy? no  Pt currently taking Antiplatelet therapy? no    Coordination of Care:  1. Have you been to the ER, urgent care clinic since your last visit? Hospitalized since your last visit? no    2. Have you seen or consulted any other health care providers outside of the [address] [place] [address] since your last visit? Include any pap smears or colon screening. no      Please see Red banners under Allergies and Med Rec to remove outside inquires. All correct information has been verified with patient and added to chart.      Medication's patient's would liked removed has been marked not taking to be removed per Verbal order and read back per Jaki Villagran MD

## 2023-03-28 NOTE — TELEPHONE ENCOUNTER
[employer] contacted the office to receive some additional diagnosis codes for the prior authorization. Advised representative of the ICD 10 codes used for the medication. Representative stated that the prior authorization has been approved from the dates 1/1/2017 to 1/31/2018. Representative stated that they will contact the patient and send a confirmation letter to the office. Fax number verified.  Awaiting fax Rifampin Pregnancy And Lactation Text: This medication is Pregnancy Category C and it isn't know if it is safe during pregnancy. It is also excreted in breast milk and should not be used if you are breast feeding.

## 2023-05-10 RX ORDER — FLECAINIDE ACETATE 50 MG/1
50 TABLET ORAL 2 TIMES DAILY
Qty: 10 TABLET | Refills: 1 | OUTPATIENT
Start: 2023-05-10

## 2023-05-10 RX ORDER — FLECAINIDE ACETATE 50 MG/1
50 TABLET ORAL 2 TIMES DAILY
Qty: 180 TABLET | Refills: 2 | Status: SHIPPED | OUTPATIENT
Start: 2023-05-10

## 2023-05-19 ENCOUNTER — TELEPHONE (OUTPATIENT)
Facility: CLINIC | Age: 73
End: 2023-05-19

## 2023-05-20 DIAGNOSIS — M15.9 PRIMARY OSTEOARTHRITIS INVOLVING MULTIPLE JOINTS: Primary | ICD-10-CM

## 2023-05-23 ENCOUNTER — TELEPHONE (OUTPATIENT)
Facility: CLINIC | Age: 73
End: 2023-05-23

## 2023-05-23 DIAGNOSIS — M79.671 PAIN IN BOTH FEET: Primary | ICD-10-CM

## 2023-05-23 DIAGNOSIS — M79.672 PAIN IN BOTH FEET: Primary | ICD-10-CM

## 2023-05-23 NOTE — TELEPHONE ENCOUNTER
----- Message from Quintin Boeck sent at 5/23/2023 10:54 AM EDT -----  Subject: Referral Request    Reason for referral request? Pt would like a referral for to [facility] for her feet. She made her appt 6/29 at 2pm with   Dr. Lacey Singh  Provider patient wants to be referred to(if known):     Provider Phone Number(if known):[phone]    Additional Information for Provider? She will be seeing Dr. Briana Tatum 7/3   for her hands.    ---------------------------------------------------------------------------  --------------  Lucila SERRANO    [phone]; OK to leave message on voicemail  ---------------------------------------------------------------------------  --------------

## 2023-05-23 NOTE — TELEPHONE ENCOUNTER
----- Message from Chavis Gustavo sent at 5/23/2023 10:54 AM EDT -----  Subject: Referral Request    Reason for referral request? Pt would like a referral for to [facility] for her feet. She made her appt 6/29 at 2pm with   Dr. Jory Garcia  Provider patient wants to be referred to(if known):     Provider Phone Number(if known):[phone]    Additional Information for Provider? She will be seeing Dr. Will Neves 7/3   for her hands.    ---------------------------------------------------------------------------  --------------  Evelia Ocampo [facility]    [phone]; OK to leave message on voicemail  ---------------------------------------------------------------------------  --------------

## 2023-07-26 ENCOUNTER — HOSPITAL ENCOUNTER (OUTPATIENT)
Dept: WOMENS IMAGING | Facility: HOSPITAL | Age: 73
Discharge: HOME | End: 2023-07-29
Attending: FAMILY MEDICINE
Payer: MEDICARE

## 2023-07-26 DIAGNOSIS — Z12.31 ENCOUNTER FOR SCREENING MAMMOGRAM FOR MALIGNANT NEOPLASM OF BREAST: ICD-10-CM

## 2023-07-26 PROCEDURE — 77063 BREAST TOMOSYNTHESIS BI: CPT

## 2023-08-30 ENCOUNTER — TELEPHONE (OUTPATIENT)
Age: 73
End: 2023-08-30

## 2023-08-31 NOTE — TELEPHONE ENCOUNTER
Contacted pt at [facility] number. Two patient Identifiers confirmed. Informed pt per Dr Soham Sanchez notes. Pt stated her BP has been going low. Pt stated her BP in the am are around 110/ 55  HR70. Pt stated she would get her  to review numbers and send to office. Will await readings for provider review.

## 2023-08-31 NOTE — TELEPHONE ENCOUNTER
Verbal order with read back [facility], [facility] essentially normal

## 2023-09-01 NOTE — TELEPHONE ENCOUNTER
Contacted pt at [facility] number. Two patient Identifiers confirmed. Followed up on numbers given for BP. Pt stated she would get her  to send via [employer] once he got back home. Will await chart for provider to preview. Pt stated her BP is low in the am and is elevated in the mornings.

## 2023-09-06 NOTE — TELEPHONE ENCOUNTER
Contacted pt at [facility] number. Two patient Identifiers confirmed. Pt stated her recent numbers for BP and HR were as followed:   Pt stated she has been taking flecainide 50 mg ( 0.5 tab at night only) x 2 nights and resumed a whole tab BID.   8/28/23 /65 HR 69 @ night  08/29/23 BP 0928 109/62 HR 71  08/31/23 9:58 PM  /74 HR 75  9/1/23 2223  /61 HR 73  9/2/23 0900  /72 HR 69              2007 /65 HR 68  9/3/23 0913 105/62 HR 79 * half tab this night*             1430  /69 HR 79              2200 /65 HR 72  9/4/23  0900 /73 HR 68* half tab this night*              1300 /63 HR 61  9/5/23   0851 /74 HR 68* whole tab this night*               2028 /64 HR 75  Will review with provider and advise. Patient verbalized understanding.

## 2023-09-07 NOTE — TELEPHONE ENCOUNTER
Attempted to contact pt at  number, no answer. Lvm for pt to return call to office at [phone]. Will continue to try to contact pt.

## 2023-09-07 NOTE — TELEPHONE ENCOUNTER
Verbal order with read back Kee Craft MD.  Hold Coreg 6.25 mg tab ( 0.5 tab) BID if systolic is under 222

## 2023-09-08 NOTE — TELEPHONE ENCOUNTER
Contacted pt at [facility] number. Two patient Identifiers confirmed. Advised pt per Dr Mervin Traore notes. Pt verbalized understanding.

## 2023-10-03 ENCOUNTER — OFFICE VISIT (OUTPATIENT)
Facility: CLINIC | Age: 73
End: 2023-10-03
Payer: MEDICARE

## 2023-10-03 DIAGNOSIS — Z23 ENCOUNTER FOR IMMUNIZATION: Primary | ICD-10-CM

## 2023-10-03 PROCEDURE — G0008 ADMIN INFLUENZA VIRUS VAC: HCPCS | Performed by: FAMILY MEDICINE

## 2023-10-03 PROCEDURE — 90694 VACC AIIV4 NO PRSRV 0.5ML IM: CPT | Performed by: FAMILY MEDICINE

## 2023-10-03 PROCEDURE — 99212 OFFICE O/P EST SF 10 MIN: CPT | Performed by: FAMILY MEDICINE

## 2023-10-03 PROCEDURE — 1123F ACP DISCUSS/DSCN MKR DOCD: CPT | Performed by: FAMILY MEDICINE

## 2023-10-03 SDOH — ECONOMIC STABILITY: INCOME INSECURITY: HOW HARD IS IT FOR YOU TO PAY FOR THE VERY BASICS LIKE FOOD, HOUSING, MEDICAL CARE, AND HEATING?: NOT HARD AT ALL

## 2023-10-03 SDOH — ECONOMIC STABILITY: HOUSING INSECURITY
IN THE LAST 12 MONTHS, WAS THERE A TIME WHEN YOU DID NOT HAVE A STEADY PLACE TO SLEEP OR SLEPT IN A SHELTER (INCLUDING NOW)?: NO

## 2023-10-03 SDOH — ECONOMIC STABILITY: FOOD INSECURITY: WITHIN THE PAST 12 MONTHS, YOU WORRIED THAT YOUR FOOD WOULD RUN OUT BEFORE YOU GOT MONEY TO BUY MORE.: NEVER TRUE

## 2023-10-03 SDOH — ECONOMIC STABILITY: FOOD INSECURITY: WITHIN THE PAST 12 MONTHS, THE FOOD YOU BOUGHT JUST DIDN'T LAST AND YOU DIDN'T HAVE MONEY TO GET MORE.: NEVER TRUE

## 2023-10-03 ASSESSMENT — PATIENT HEALTH QUESTIONNAIRE - PHQ9
SUM OF ALL RESPONSES TO PHQ9 QUESTIONS 1 & 2: 0
SUM OF ALL RESPONSES TO PHQ QUESTIONS 1-9: 0
2. FEELING DOWN, DEPRESSED OR HOPELESS: 0
SUM OF ALL RESPONSES TO PHQ QUESTIONS 1-9: 0
SUM OF ALL RESPONSES TO PHQ QUESTIONS 1-9: 0
1. LITTLE INTEREST OR PLEASURE IN DOING THINGS: 0
SUM OF ALL RESPONSES TO PHQ QUESTIONS 1-9: 0

## 2023-10-03 ASSESSMENT — ANXIETY QUESTIONNAIRES
5. BEING SO RESTLESS THAT IT IS HARD TO SIT STILL: 0
4. TROUBLE RELAXING: 0
7. FEELING AFRAID AS IF SOMETHING AWFUL MIGHT HAPPEN: 0
3. WORRYING TOO MUCH ABOUT DIFFERENT THINGS: 0
IF YOU CHECKED OFF ANY PROBLEMS ON THIS QUESTIONNAIRE, HOW DIFFICULT HAVE THESE PROBLEMS MADE IT FOR YOU TO DO YOUR WORK, TAKE CARE OF THINGS AT HOME, OR GET ALONG WITH OTHER PEOPLE: NOT DIFFICULT AT ALL
6. BECOMING EASILY ANNOYED OR IRRITABLE: 0
1. FEELING NERVOUS, ANXIOUS, OR ON EDGE: 0
2. NOT BEING ABLE TO STOP OR CONTROL WORRYING: 0
GAD7 TOTAL SCORE: 0

## 2023-10-11 ENCOUNTER — OFFICE VISIT (OUTPATIENT)
Facility: CLINIC | Age: 73
End: 2023-10-11
Payer: MEDICARE

## 2023-10-11 VITALS
SYSTOLIC BLOOD PRESSURE: 140 MMHG | BODY MASS INDEX: 21.34 KG/M2 | WEIGHT: 113 LBS | OXYGEN SATURATION: 98 % | HEIGHT: 61 IN | TEMPERATURE: 97.7 F | DIASTOLIC BLOOD PRESSURE: 84 MMHG | RESPIRATION RATE: 17 BRPM | HEART RATE: 71 BPM

## 2023-10-11 DIAGNOSIS — K59.00 CONSTIPATION, UNSPECIFIED CONSTIPATION TYPE: Primary | ICD-10-CM

## 2023-10-11 PROCEDURE — 99213 OFFICE O/P EST LOW 20 MIN: CPT

## 2023-10-11 PROCEDURE — 1123F ACP DISCUSS/DSCN MKR DOCD: CPT

## 2023-10-11 RX ORDER — DOCUSATE SODIUM 100 MG/1
100 CAPSULE, LIQUID FILLED ORAL 2 TIMES DAILY
Qty: 60 CAPSULE | Refills: 0 | Status: SHIPPED | OUTPATIENT
Start: 2023-10-11 | End: 2023-11-10

## 2023-10-11 RX ORDER — POLYETHYLENE GLYCOL 3350 17 G/17G
17 POWDER, FOR SOLUTION ORAL DAILY
Qty: 225 G | Refills: 3 | Status: SHIPPED | OUTPATIENT
Start: 2023-10-11 | End: 2023-11-10

## 2023-10-11 SDOH — ECONOMIC STABILITY: INCOME INSECURITY: HOW HARD IS IT FOR YOU TO PAY FOR THE VERY BASICS LIKE FOOD, HOUSING, MEDICAL CARE, AND HEATING?: NOT HARD AT ALL

## 2023-10-11 SDOH — ECONOMIC STABILITY: FOOD INSECURITY: WITHIN THE PAST 12 MONTHS, YOU WORRIED THAT YOUR FOOD WOULD RUN OUT BEFORE YOU GOT MONEY TO BUY MORE.: NEVER TRUE

## 2023-10-11 SDOH — ECONOMIC STABILITY: FOOD INSECURITY: WITHIN THE PAST 12 MONTHS, THE FOOD YOU BOUGHT JUST DIDN'T LAST AND YOU DIDN'T HAVE MONEY TO GET MORE.: NEVER TRUE

## 2023-10-11 NOTE — PROGRESS NOTES
Kadi Perez is a 74y.o. year old female who presents in office today for   Chief Complaint   Patient presents with    Follow-up       Is someone accompanying this pt? NO    Is the patient using any DME equipment during OV? NO    Depression Screening:       10/3/2023     9:54 AM 12/2/2022    11:14 AM 6/2/2022    10:46 AM 6/2/2022    10:30 AM 4/14/2022    10:53 AM 3/1/2022    11:02 AM 2/3/2022     9:55 AM   PHQ-9 Questionaire   Little interest or pleasure in doing things 0 0 0 0 0 0 0   Feeling down, depressed, or hopeless 0 0 0 0 0 0 0   PHQ-9 Total Score 0 0 0 0 0 0 0       Abuse Screening:      10/11/2023    10:00 AM 6/15/2023    10:00 AM   AMB Abuse Screening   Do you ever feel afraid of your partner? N N   Are you in a relationship with someone who physically or mentally threatens you? N N   Is it safe for you to go home? Y Y       Learning Assessment:  No question data found. Fall Risk:      10/3/2023     9:55 AM 6/15/2023    10:11 AM   Fall Risk   2 or more falls in past year? no no   Fall with injury in past year? no no           Coordination of Care:   1. \"Have you been to the ER, urgent care clinic since your last visit? Hospitalized since your last visit? \" NO    2. \"Have you seen or consulted any other health care providers outside of the [address] [place] [address] since your last visit? \" NO    3. For patients aged 43-73: Has the patient had a colonoscopy / FIT/ Cologuard? NA    If the patient is female:    4. For patients aged 43-66: Has the patient had a mammogram within the past 2 years? NA    5. For patients aged 21-65: Has the patient had a pap smear? NA    Health Maintenance: reviewed and discussed and ordered per Provider.     Health Maintenance Due   Topic Date Due    Shingles vaccine (2 of 3) 02/04/2011    COVID-19 Vaccine (4 - Pfizer series) 02/17/2022        -[facility] [id number]  [place], [place]  Ph: [phone]

## 2023-10-19 NOTE — PROGRESS NOTES
Problem: VTE, Risk for  Goal: # No s/s of VTE  Outcome: Outcome Met, Continue evaluating goal progress toward completion  Goal: # Verbalizes understanding of VTE risk factors and prevention  Description: Document education using the patient education activity.   Outcome: Outcome Met, Continue evaluating goal progress toward completion  Goal: Demonstrates ability to administer injectable anticoagulants if ordered for d/c  Description: Document education using the patient education activity.  Outcome: Outcome Met, Continue evaluating goal progress toward completion     Problem: Pain  Goal: #Acceptable pain level achieved/maintained at rest using NRS/Faces  Description: This goal is used for patients who can self-report.  Acceptable means the level is at or below the identified comfort/function goal.  Outcome: Outcome Not Met, Continue to Monitor  Goal: # Acceptable pain level achieved/maintained at rest using NRS/Faces without oversedation (opioid naive or PCA/Epidural infusion)  Description: This goal is used if Opioid-naïve or on PCA/Epidural Infusion.  Outcome: Outcome Not Met, Continue to Monitor  Goal: # Acceptable pain level achieved/maintained with activity using NRS/Faces  Description: This goal is used for patients who can self-report and are not achieving acceptable pain control during activity.  Outcome: Outcome Not Met, Continue to Monitor  Goal: Acceptable pain/comfort level is achieved/maintained at rest (based on Pain Behaviors Scale)  Description: This goal is used for patients who are not able to self-report pain and are assessed for pain using the Pain Behaviors Scale  Outcome: Outcome Not Met, Continue to Monitor  Goal: Acceptable pain/comfort level is achieved/maintained at rest based on PAINAID scale (Dementia)  Description: This goal is used for patients who are not able to self-report pain, have dementia, and assessed using the PAINAD scale.  Outcome: Outcome Not Met, Continue to  Patient ID: Manisha Echeverria is a 74 y.o. female established patient presents for the following:      Subjective:     HPI  Manisha Echeverria presents for follow-up of constipation. She states that her water intake is very low and that she acknowledges that she needs to increase her water intake and aerobic exercise. She states that she stays active with gardening and household chores but does not have routine exercise program.  She takes frequent laxatives to help with bowel movements and has been eating 1 prune a day. Increased dietary fiber, increased water, increased exercise were recommended. It was discussed with patient risk for laxative dependence and rebound constipation. She verbalized understanding. Encouraged stool softeners, high-fiber diet and activity. If she does not have bowel movement in 4 days, she can take laxative to produce bowel movement. She was also encouraged to have more prunes in the day.     Past Medical History:   Diagnosis Date    Alopecia 1/13/2011    Arthritis     Halitosis 1/13/2011    Menopause     Ovarian cancer ([address])     02/1998    Restless leg syndrome     SVT (supraventricular tachycardia) (HCC)     EPS suggested atrial tachycardia 04/22       Past Surgical History:   Procedure Laterality Date    CATARACT REMOVAL Right     GYN      HYSTERECTOMY (CERVIX STATUS UNKNOWN)         Current Outpatient Medications   Medication Sig Dispense Refill    docusate sodium (COLACE) 100 MG capsule Take 1 capsule by mouth 2 times daily 60 capsule 0    polyethylene glycol (GLYCOLAX) 17 GM/SCOOP powder Take 17 g by mouth daily 225 g 3    flecainide (TAMBOCOR) 50 MG tablet Take 1 tablet by mouth 2 times daily 180 tablet 2    baclofen (LIORESAL) 10 MG tablet Take 1 tablet by mouth 3 times daily      carvedilol (COREG) 6.25 MG tablet 0.5 tablets 2 times daily      dextran 70-hypromellose (TEARS NATURALE) 0.1-0.3 % SOLN opthalmic solution Apply 1 drop to eye as needed      diclofenac sodium (VOLTAREN) Monitor  Goal: Acceptable pain/comfort level is achieved/maintained at rest (based on pediatric behavior tool: NIPS, NPASS, or FLACC)  Description: This goal is used for pediatric patients who are not able to self report pain.  Outcome: Outcome Not Met, Continue to Monitor  Goal: # Verbalizes understanding of pain management  Description: Documented in Patient Education Activity  Outcome: Outcome Not Met, Continue to Monitor  Goal: Verbalizes understanding and effective use of Patient Controlled Analgesia (PCA)  Description: Documented in Patient Education Activity  This goal is used for patients with PCA  Outcome: Outcome Not Met, Continue to Monitor  Goal: Maximum comfort achieved/maintained at end of life (Hospice)  Outcome: Outcome Not Met, Continue to Monitor     Problem: At Risk for Falls  Goal: # Patient does not fall  Outcome: Outcome Not Met, Continue to Monitor  Goal: # Takes action to control fall-related risks  Outcome: Outcome Not Met, Continue to Monitor  Goal: # Verbalizes understanding of fall risk/precautions  Description: Document education using the patient education activity  Outcome: Outcome Not Met, Continue to Monitor     Problem: At Risk for Injury Due to Fall  Goal: # Patient does not fall  Outcome: Outcome Not Met, Continue to Monitor  Goal: # Takes action to control condition specific risks  Outcome: Outcome Not Met, Continue to Monitor  Goal: # Verbalizes understanding of fall-related injury personal risks  Description: Document education using the patient education activity  Outcome: Outcome Not Met, Continue to Monitor

## 2023-10-24 ENCOUNTER — OFFICE VISIT (OUTPATIENT)
Age: 73
End: 2023-10-24

## 2023-10-24 VITALS
BODY MASS INDEX: 21.54 KG/M2 | DIASTOLIC BLOOD PRESSURE: 64 MMHG | OXYGEN SATURATION: 99 % | SYSTOLIC BLOOD PRESSURE: 141 MMHG | WEIGHT: 114 LBS | HEART RATE: 68 BPM

## 2023-10-24 DIAGNOSIS — I10 ESSENTIAL HYPERTENSION WITH GOAL BLOOD PRESSURE LESS THAN 140/90: Primary | ICD-10-CM

## 2023-10-24 NOTE — PROGRESS NOTES
[facility] 74 [facility] with a history of SVT / , ovarian cancer      Patient is here today for follow-up appointment. She denies any prior history of MI or CHF. Patient was diagnosed with narrow complex tachycardia by EKG in 07/25/2020. She was admitted to the hospital at the time and beta-blocker was started. She also had a echocardiogram and nuclear stress test in 7/2020   Patient underwent EP study in 04/2022 which showed short burst of atrial tachycardia  after Isuprel infusion. Patient was started on metoprolol and also flecainide with presumed atrial tachycardia     Having lot of back pain and has noted some increase BP numbers  NO angina or heart failure symptoms. Past Medical History:   Diagnosis Date    Alopecia 1/13/2011    Arthritis     Halitosis 1/13/2011    Menopause     Ovarian cancer ([address])     02/1998    Restless leg syndrome     SVT (supraventricular tachycardia) (HCC)     EPS suggested atrial tachycardia 04/22       Review of Systems:  Cardiac symptoms as noted above in HPI. All others negative. Current Outpatient Medications   Medication Sig    flecainide (TAMBOCOR) 50 MG tablet Take 1 tablet by mouth 2 times daily    carvedilol (COREG) 6.25 MG tablet 0.5 tablets 2 times daily    docusate sodium (COLACE) 100 MG capsule Take 1 capsule by mouth 2 times daily    polyethylene glycol (GLYCOLAX) 17 GM/SCOOP powder Take 17 g by mouth daily    baclofen (LIORESAL) 10 MG tablet Take 1 tablet by mouth 3 times daily    dextran 70-hypromellose (TEARS NATURALE) 0.1-0.3 % SOLN opthalmic solution Apply 1 drop to eye as needed    diclofenac sodium (VOLTAREN) 1 % GEL Apply 2 g topically 4 times daily    fexofenadine-pseudoephedrine (ALLEGRA-D 12HR)  MG per extended release tablet Take 1 tablet by mouth in the morning and 1 tablet in the evening.      No current facility-administered medications for this

## 2023-10-24 NOTE — PROGRESS NOTES
Identified pt with two pt identifiers(name and ). Reviewed record in preparation for visit and have obtained necessary documentation. [facility] presents today for   Chief Complaint   Patient presents with    Follow-up       Pt c/o SOB, FATIGUE/WEAKNESS,  SWELLING. [facility] preferred language for health care discussion is english/other. Personal Protective Equipment:   Personal Protective Equipment was used including: mask-surgical and hands-gloves. Patient was placed on no precaution(s). Patient was not masked. Precautions:   Patient currently on None  Patient currently roomed with door closed. Is someone accompanying this pt?     Is the patient using any DME equipment during  [facility] [address]? no    Depression Screening:      10/3/2023     9:54 AM 2022    11:14 AM 2022    10:46 AM 2022    10:30 AM 2022    10:53 AM 3/1/2022    11:02 AM 2/3/2022     9:55 AM   PHQ-9 Questionaire   Little interest or pleasure in doing things 0 0 0 0 0 0 0   Feeling down, depressed, or hopeless 0 0 0 0 0 0 0   PHQ-9 Total Score 0 0 0 0 0 0 0        Learning Assessment:  No question data found. Abuse Screening:      10/24/2023     4:00 PM 10/11/2023    10:00 AM 6/15/2023    10:00 AM   AMB Abuse Screening   Do you ever feel afraid of your partner? N N N   Are you in a relationship with someone who physically or mentally threatens you? N N N   Is it safe for you to go home? Ting Quiet          Fall Risk      10/24/2023     4:06 PM 10/11/2023    10:32 AM 10/3/2023     9:55 AM 6/15/2023    10:11 AM   Fall Risk   2 or more falls in past year? no no no no   Fall with injury in past year? no no no no         Pt currently taking Anticoagulant therapy? no  Pt currently taking Antiplatelet therapy? no    Coordination of Care:  1. Have you been to the ER, urgent care clinic since your last visit? Hospitalized since your last visit? no    2.  Have you seen or consulted any other health care providers

## 2023-10-27 DIAGNOSIS — I48.91 ATRIAL FIBRILLATION, UNSPECIFIED TYPE (HCC): ICD-10-CM

## 2023-10-27 DIAGNOSIS — R06.02 SOB (SHORTNESS OF BREATH): Primary | ICD-10-CM

## 2023-10-27 DIAGNOSIS — I47.10 SUPRAVENTRICULAR TACHYCARDIA: ICD-10-CM

## 2024-01-01 NOTE — PROGRESS NOTES
"Jose Eduardo Colmenares Lawrence Koehler is a 6 m.o. male who is brought in for this well child visit.    History was provided by the mother.    Birth History    Birth     Length: 48.3 cm (19\")     Weight: 3340 g (7 lb 5.8 oz)    Apgar     One: 8     Five: 9    Discharge Weight: 3180 g (7 lb 0.2 oz)    Delivery Method: , Low Transverse    Gestation Age: 38 4/7 wks    Days in Hospital: 2.0    Hospital Name: [facility] Location: [place] [address]       The following portions of the patient's history were reviewed and updated as appropriate: allergies, current medications, past family history, past medical history, past social history, past surgical history, and problem list.    Current Issues:  Current concerns include no .  Any Specialty or Emergency Care since last visit? No     Any concerns with how your child sees? No   Any concerns with how your child hears? No     Review of Nutrition:  Current diet: formula (Similac Advance) and solids (baby foods ) juice   Current feeding pattern: 2 oz of formula after baby foods 3 times a day, juice 2 oz 1 time a day   Difficulties with feeding? no  Any concerns with urine output, constipation, diarrhea? Constipation   What is your primary source of drinking water? bottled    Review of Sleep:  Current Sleep Patterns   Hours per night: 9   # of awakenings: 0   Naps: 0    Social Screening:  Who lives in the home with the infant? Mom, dad, 2 siblings, grandmother   Current child-care arrangements: in home: primary caregiver is mother  Parental coping and self-care: doing well; no concerns  Secondhand smoke exposure? no  Any concerns for food or housing insecurity? Would you like to see our  for resources? No     Do you have any concern that your child may have been exposed to TB? No    Does your child live in or regularly visit a house or  facility built before  that is being or has recently been (within the " Verbal order with read back Kee Sen MD.   NST last 6 months) renovated or remodeled? No    Does your child live in or regularly visit a house or  facility built before 1950? No    Development:  Do you have any concerns about your child's development? No     Developmental Screening from Rooming Flowsheet:  Developmental 4 Months Appropriate       Question Response Comments    Gurgles, coos, babbles, or similar sounds Yes  Yes on 2024 (Age - 5 m)    Follows caretaker's movements by turning head from one side to facing directly forward Yes  Yes on 2024 (Age - 5 m)    Follows parent's movements by turning head from one side almost all the way to the other side Yes  Yes on 2024 (Age - 5 m)    Lifts head off ground when lying prone Yes  Yes on 2024 (Age - 5 m)    Lifts head to 45' off ground when lying prone Yes  Yes on 2024 (Age - 5 m)    Lifts head to 90' off ground when lying prone Yes  Yes on 2024 (Age - 5 m)    Laughs out loud without being tickled or touched Yes  Yes on 2024 (Age - 5 m)    Plays with hands by touching them together Yes  Yes on 2024 (Age - 5 m)    Will follow caretaker's movements by turning head all the way from one side to the other Yes  Yes on 2024 (Age - 5 m)          Developmental 6 Months Appropriate       Question Response Comments    Hold head upright and steady Yes  Yes on 2024 (Age - 6 m)    When placed prone will lift chest off the ground Yes  Yes on 2024 (Age - 6 m)    Occasionally makes happy high-pitched noises (not crying) Yes  Yes on 2024 (Age - 6 m)    Rolls over from stomach->back and back->stomach Yes  Yes on 2024 (Age - 6 m)    Smiles at inanimate objects when playing alone Yes  Yes on 2024 (Age - 6 m)    Seems to focus gaze on small (coin-sized) objects Yes  Yes on 2024 (Age - 6 m)    Will  toy if placed within reach Yes  Yes on 2024 (Age - 6 m)    Can keep head from lagging when pulled from supine to sitting Yes  Yes on  "2024 (Age - 6 m)             ___________________________________________________________________________________________________________________________________________    Objective      Immunization History   Administered Date(s) Administered    DTaP / Hep B / IPV 2024, 2024    Hep B, Adolescent or Pediatric 2024    Hib (PRP-OMP) 2024, 2024    Pneumococcal Conjugate 20-Valent (PCV20) 2024, 2024    Rotavirus Monovalent 2024, 2024       Growth parameters are noted and are appropriate for age.     Vitals:    08/26/24 0915   Pulse: 128   Resp: 32   Temp: 98.8 °F (37.1 °C)   TempSrc: Rectal   SpO2: 98%   Weight: 7938 g (17 lb 8 oz)   Height: 69.9 cm (27.5\")   HC: 45.7 cm (18\")         Appearance: no acute distress, alert, well-nourished, well-tended appearance  Head/Neck: normocephalic, anterior fontanelle soft open and flat, sutures well approximated, neck supple, no masses appreciated, no lymphadenopathy  Eyes: pupils equal and round, +red reflex bilaterally, conjunctivae normal, sclerae anicteric, no discharge  Ears: external auditory canals normal, tympanic membranes normal bilaterally  Nose: external nose normal, nares patent  Throat: moist mucous membranes, lip appearance normal, normal dentition for age. gums pink, non-swollen, no bleeding. Tongue moist and normal. Hard and soft palate intact  Lungs: breathing comfortably, clear to auscultation bilaterally. No wheezes, rales, or rhonchi  Heart: regular rate and rhythm, normal S1 and S2, no murmurs, rubs, or gallops  Abdomen: +bowel sounds, soft, nontender, nondistended, no hepatosplenomegaly, no masses palpated.   Genitourinary: normal external genitalia, anus patent  Musculoskeletal: negative Ortolani and Robbins maneuvers. Normal range of motion of all 4 extremities.   Spine: no scoliosis, no sacral pits or chirag  Skin: normal color, skin pink, no rashes, no lesions, no jaundice  Neuro: actively moves " all extremities. Tone normal in all 4 extremities      Assessment & Plan     Healthy 6 m.o. male infant.       Diagnoses and all orders for this visit:    1. Encounter for well child visit at 6 months of age (Primary)  Assessment & Plan:  Growing and developing well  Age appropriate anticipatory guidance regarding growth, development, nutrition, vaccination, and safety discussed and handout given to caregiver.       2. Encounter for childhood immunizations appropriate for age  Assessment & Plan:  CDC VIS provided to and discussed with caregiver including risks and benefits of vaccines to be administered at today's visit (see vaccines below), reviewed signs and symptoms of vaccine reactions and when to call clinic.       Other orders  -     DTaP HepB IPV Combined Vaccine IM  -     Pneumococcal Conjugate Vaccine 20-Valent All        Return for 9 Month C.

## 2024-01-16 ENCOUNTER — TELEPHONE (OUTPATIENT)
Facility: CLINIC | Age: 74
End: 2024-01-16

## 2024-01-16 NOTE — TELEPHONE ENCOUNTER
----- Message from Arlene Benavides sent at 1/16/2024  2:48 PM EST -----  Subject: Message to Provider    QUESTIONS  Information for Provider? patient called and stated that she was returning   a call she and he  Carlos is wanting to let the office know that   she has to switch pcps and that the office did a great job thanks for all   the help   ---------------------------------------------------------------------------  --------------  CALL BACK INFO  [phone]; OK to leave message on voicemail  ---------------------------------------------------------------------------  --------------  SCRIPT ANSWERS  undefined

## 2024-01-24 NOTE — TELEPHONE ENCOUNTER
PCP: No primary care provider on file.    Last appt:  10/24/2023   Future Appointments   Date Time Provider Department Center   1/30/2024  9:00 AM [facility] BS AMB   7/29/2024 11:30 AM [facility] BX RM 1 [facility]   10/8/2024 10:15 AM Kee Do MD [facility] BS AMB       Requested Prescriptions     Pending Prescriptions Disp Refills    flecainide (TAMBOCOR) 50 MG tablet 180 tablet 2     Sig: Take 1 tablet by mouth 2 times daily

## 2024-01-25 RX ORDER — FLECAINIDE ACETATE 50 MG/1
50 TABLET ORAL 2 TIMES DAILY
Qty: 180 TABLET | Refills: 2 | Status: SHIPPED | OUTPATIENT
Start: 2024-01-25

## 2024-02-05 ENCOUNTER — TELEPHONE (OUTPATIENT)
Age: 74
End: 2024-02-05

## 2024-02-05 NOTE — TELEPHONE ENCOUNTER
Contacted pt at  number. Two patient Identifiers confirmed. Advised pt per SANTHOSH Do. Pt verbalized understanding.

## 2024-02-05 NOTE — TELEPHONE ENCOUNTER
----- Message from Kee ARMENTA MD sent at 2/2/2024  2:40 PM EST -----  Please inform patient regarding test finding  Appears normal.    Thanks  SP

## 2024-03-06 NOTE — TELEPHONE ENCOUNTER
Incoming from patient. Two patient identifiers confirmed. Patient requesting medication refill.    PCP: No primary care provider on file.  Last appt:  10/24/2023   Future Appointments   Date Time Provider Department Center   7/29/2024 11:30 AM [facility]   10/8/2024 10:15 AM Kee Do MD [facility] BS AMB       Medication requested: flecanide 50mg    [facility]    [facility] but [facility]        162

## 2024-03-06 NOTE — TELEPHONE ENCOUNTER
PCP: No primary care provider on file.    Last appt:  10/24/2023   Future Appointments   Date Time Provider Department Center   7/29/2024 11:30 AM [facility] STEREO BX RM 1 [facility]   10/8/2024 10:15 AM Kee Do MD CAG BS AMB       Requested Prescriptions     Pending Prescriptions Disp Refills    flecainide (TAMBOCOR) 50 MG tablet 180 tablet 2     Sig: Take 1 tablet by mouth 2 times daily

## 2024-03-07 RX ORDER — FLECAINIDE ACETATE 50 MG/1
50 TABLET ORAL 2 TIMES DAILY
Qty: 180 TABLET | Refills: 2 | Status: SHIPPED | OUTPATIENT
Start: 2024-03-07

## 2024-04-09 DIAGNOSIS — I10 ESSENTIAL (PRIMARY) HYPERTENSION: ICD-10-CM

## 2024-04-09 DIAGNOSIS — I10 ESSENTIAL HYPERTENSION WITH GOAL BLOOD PRESSURE LESS THAN 140/90: Primary | ICD-10-CM

## 2024-04-09 RX ORDER — FLECAINIDE ACETATE 50 MG/1
50 TABLET ORAL 2 TIMES DAILY
Qty: 180 TABLET | Refills: 2 | Status: SHIPPED | OUTPATIENT
Start: 2024-04-09

## 2024-04-09 RX ORDER — CEPHALEXIN 250 MG/1
CAPSULE ORAL
COMMUNITY

## 2024-04-09 RX ORDER — ERGOCALCIFEROL 1.25 MG/1
CAPSULE ORAL
COMMUNITY
Start: 2024-03-25

## 2024-04-09 NOTE — TELEPHONE ENCOUNTER
Incoming from patient. Two patient identifiers confirmed. Patient requesting medication refill.    PCP: No primary care provider on file.  Last appt:  10/24/2023   Future Appointments   Date Time Provider Department Center   4/25/2024 10:30 AM Kee Do MD [facility] BS AMB   7/29/2024 11:30 AM [facility] STEREO BX RM 1 [facility]   10/8/2024 10:15 AM Kee Do MD [facility] BS AMB       Medication requested: flecanide 50 mg tablets    [facility]

## 2024-04-10 DIAGNOSIS — I10 ESSENTIAL HYPERTENSION WITH GOAL BLOOD PRESSURE LESS THAN 140/90: ICD-10-CM

## 2024-04-10 DIAGNOSIS — I10 ESSENTIAL (PRIMARY) HYPERTENSION: ICD-10-CM

## 2024-04-15 RX ORDER — CARVEDILOL 6.25 MG/1
TABLET ORAL
Qty: 60 TABLET | Refills: 2 | Status: SHIPPED | OUTPATIENT
Start: 2024-04-15

## 2024-04-15 NOTE — TELEPHONE ENCOUNTER
PCP: No primary care provider on file.    Last appt:  10/24/2023   Future Appointments   Date Time Provider Department Center   2024 10:30 AM Kee Do MD [facility] BS AMB   2024 11:30 [facility]   10/8/2024 10:15 AM Kee Do MD CAG BS AMB       Requested Prescriptions     Pending Prescriptions Disp Refills    carvedilol (COREG) 6.25 MG tablet 60 tablet 0     Si.5 tablets 2 times daily       Request for a 30 or 90 day supply? Provider Discretion    [facility]    Other Comments: Patient is out of medication. [facility]. Still [facility]

## 2024-04-15 NOTE — TELEPHONE ENCOUNTER
Incoming from patient. Two patient identifiers confirmed. Patient requesting medication refill.    PCP: No primary care provider on file.  Last appt:  10/24/2023   Future Appointments   Date Time Provider Department Center   4/25/2024 10:30 AM Kee Do MD [facility] BS AMB   7/29/2024 11:30 AM [facility]   10/8/2024 10:15 AM Kee Do MD CAG BS AMB       Medication requested: carvedilol 6.2mg    [facility]    Patient stated she will run out of pills and would like [facility] a [id number]

## 2024-04-25 ENCOUNTER — OFFICE VISIT (OUTPATIENT)
Age: 74
End: 2024-04-25
Payer: MEDICARE

## 2024-04-25 VITALS
DIASTOLIC BLOOD PRESSURE: 68 MMHG | WEIGHT: 111.8 LBS | HEART RATE: 68 BPM | BODY MASS INDEX: 21.11 KG/M2 | HEIGHT: 61 IN | OXYGEN SATURATION: 100 % | SYSTOLIC BLOOD PRESSURE: 159 MMHG

## 2024-04-25 DIAGNOSIS — E78.00 PURE HYPERCHOLESTEROLEMIA: ICD-10-CM

## 2024-04-25 DIAGNOSIS — I48.0 PAF (PAROXYSMAL ATRIAL FIBRILLATION) (HCC): ICD-10-CM

## 2024-04-25 DIAGNOSIS — Z01.818 PRE-OP EVALUATION: Primary | ICD-10-CM

## 2024-04-25 DIAGNOSIS — I10 ESSENTIAL HYPERTENSION WITH GOAL BLOOD PRESSURE LESS THAN 140/90: ICD-10-CM

## 2024-04-25 PROCEDURE — 3077F SYST BP >= 140 MM HG: CPT | Performed by: INTERNAL MEDICINE

## 2024-04-25 PROCEDURE — 1123F ACP DISCUSS/DSCN MKR DOCD: CPT | Performed by: INTERNAL MEDICINE

## 2024-04-25 PROCEDURE — 99214 OFFICE O/P EST MOD 30 MIN: CPT | Performed by: INTERNAL MEDICINE

## 2024-04-25 PROCEDURE — 3078F DIAST BP <80 MM HG: CPT | Performed by: INTERNAL MEDICINE

## 2024-04-25 RX ORDER — PSEUDOEPHEDRINE HCL 30 MG
100 TABLET ORAL 2 TIMES DAILY
COMMUNITY
Start: 2024-04-09

## 2024-04-25 ASSESSMENT — PATIENT HEALTH QUESTIONNAIRE - PHQ9
2. FEELING DOWN, DEPRESSED OR HOPELESS: NOT AT ALL
SUM OF ALL RESPONSES TO PHQ9 QUESTIONS 1 & 2: 0
SUM OF ALL RESPONSES TO PHQ QUESTIONS 1-9: 0
SUM OF ALL RESPONSES TO PHQ QUESTIONS 1-9: 0
1. LITTLE INTEREST OR PLEASURE IN DOING THINGS: NOT AT ALL
SUM OF ALL RESPONSES TO PHQ QUESTIONS 1-9: 0
SUM OF ALL RESPONSES TO PHQ QUESTIONS 1-9: 0

## 2024-04-25 NOTE — PROGRESS NOTES
Identified pt with two pt identifiers(name and ). Reviewed record in preparation for visit and have obtained necessary documentation.    Micaela Zavala presents today for   Chief Complaint   Patient presents with    Follow-up       Pt c/o , SOB, , FATIGUE/WEAKNESS,  SWELLING.             Micaela Zavala preferred language for health care discussion is english/other.    Personal Protective Equipment:   Personal Protective Equipment was used including: mask-surgical and hands-gloves. Patient was placed on no precaution(s). Patient was masked.    Precautions:   Patient currently on None  Patient currently roomed with door closed.    Is someone accompanying this pt?     Is the patient using any DME equipment during OV? cane    Depression Screenin/25/2024    10:26 AM 10/3/2023     9:54 AM 2022    11:14 AM 2022    10:46 AM 2022    10:30 AM 2022    10:53 AM 3/1/2022    11:02 AM   PHQ-9 Questionaire   Little interest or pleasure in doing things 0 0 0 0 0 0 0   Feeling down, depressed, or hopeless 0 0 0 0 0 0 0   PHQ-9 Total Score 0 0 0 0 0 0 0        Learning Assessment:  No question data found.    Abuse Screenin/25/2024    10:00 AM 10/24/2023     4:00 PM 10/11/2023    10:00 AM 6/15/2023    10:00 AM   AMB Abuse Screening   Do you ever feel afraid of your partner? N N N N   Are you in a relationship with someone who physically or mentally threatens you? N N N N   Is it safe for you to go home? Y Y Y Y          Fall Risk      2024    10:26 AM 10/24/2023     4:06 PM 10/11/2023    10:32 AM 10/3/2023     9:55 AM 6/15/2023    10:11 AM   Fall Risk   2 or more falls in past year? no no no no no   Fall with injury in past year? no no no no no         Pt currently taking Anticoagulant /Antiplatelet therapy? no    Coordination of Care:  1. Have you been to the ER, urgent care clinic since your last visit? Hospitalized since your last visit? no    2. Have you seen or consulted any other

## 2024-04-25 NOTE — PROGRESS NOTES
[facility]    Micaela Zavala is 74 y.o. female with a history of SVT / , ovarian cancer      Patient is here today for follow-up appointment.  She denies any prior history of MI or CHF.   Patient was diagnosed with narrow complex tachycardia by EKG in 07/25/2020.  She was admitted to the hospital at the time and beta-blocker was started.  She also had a echocardiogram and nuclear stress test in 7/2020   Patient underwent EP study in 04/2022 which showed short burst of atrial tachycardia  after Isuprel infusion.  Patient was started on metoprolol and also flecainide with presumed atrial tachycardia     Denies any resting or exertional chest pain or chest pressure to suggest angina or any dyspnea to suggest heart failure.   No presyncope or syncope  Denies any PND or LE edema.   Taking all medications regularly.  Patient is considering hip surgery.    Past Medical History:   Diagnosis Date    Alopecia 1/13/2011    Arthritis     Halitosis 1/13/2011    Menopause     Ovarian cancer (HCC)     02/1998    Restless leg syndrome     SVT (supraventricular tachycardia) ([facility])     EPS suggested atrial tachycardia 04/22       Review of Systems:  Cardiac symptoms as noted above in HPI. All others negative.    Current Outpatient Medications   Medication Sig    docusate (COLACE, DULCOLAX) 100 MG CAPS Take 100 mg by mouth 2 times daily    carvedilol (COREG) 6.25 MG tablet 0.5 tablets 2 times daily    flecainide (TAMBOCOR) 50 MG tablet Take 1 tablet by mouth 2 times daily    cephALEXin (KEFLEX) 250 MG capsule TAKE ONE CAPSULE BY MOUTH EVERY 6 HOURS    vitamin D (ERGOCALCIFEROL) 1.25 MG (80067 UT) CAPS capsule     ketoconazole (NIZORAL) 2 % shampoo     meloxicam (MOBIC) 7.5 MG tablet     baclofen (LIORESAL) 10 MG tablet Take 1 tablet by mouth 3 times daily    dextran 70-hypromellose (TEARS NATURALE) 0.1-0.3 % SOLN opthalmic solution Apply 1 drop to eye as needed

## 2024-06-05 ENCOUNTER — HOSPITAL ENCOUNTER (OUTPATIENT)
Facility: HOSPITAL | Age: 74
Setting detail: RECURRING SERIES
Discharge: HOME | End: 2024-06-08
Payer: MEDICARE

## 2024-06-05 PROCEDURE — 97163 PT EVAL HIGH COMPLEX 45 MIN: CPT

## 2024-06-05 PROCEDURE — 97110 THERAPEUTIC EXERCISES: CPT

## 2024-06-05 NOTE — THERAPY EVALUATION
reaction:        Therapeutic Procedures:  Tx Min Billable or 1:1 Min (if diff from Tx Min) Procedure, Rationale, Specifics     69988 Therapeutic Exercise (timed):  increase ROM, strength, coordination, balance, and proprioception to improve patient's ability to progress to PLOF and address remaining functional goals. (see flow sheet as applicable)     Details if applicable: Stand hip 3-way, stand march, mini-squat                       Total  12 Total Reminder: MC/BC bill using total billable min of TIMED therapeutic procedures (example: do not include dry needle or estim unattended, both untimed codes, in totals to left)   [x]  Patient Education billed concurrently with other procedures   Advised to hold SLR supine    Access Code: [email or web address]  URL: [email or web address]  Date: 06/05/2024  Prepared by: Pavithra Gaytan    Exercises  - Standing Hip Flexion with Counter Support  - 1 x daily - 7 x weekly - 1 sets - 10 reps - 3 second hold  - Standing Hip Abduction with Counter Support  - 1 x daily - 7 x weekly - 1 sets - 10 reps - 3 second hold  - Standing Hip Extension with Counter Support  - 1 x daily - 7 x weekly - 1 sets - 10 reps - 3 second hold  - Standing March with Counter Support  - 1 x daily - 7 x weekly - 1 sets - 10 reps - 3 second hold  - Mini Squat with Counter Support  - 1 x daily - 7 x weekly - 1 sets - 10 reps - 3 second hold    ASSESSMENT     Patient will continue to benefit from skilled PT services: see plan of care       [x]  See Plan of Care for goals and assessment      PLAN  [x]  Upgrade activities as tolerated     []  Continue plan of care  []  Update interventions per flow sheet       []  Other:_      Pavithra Gaytan, PT 6/5/2024  11:37 AM    Justification for Eval Code Complexity:  Patient History : HIGH - Arthritis, ovarian cancer (s/p chemo 23 years ago), SVT, HTN, sciatica right LE  Examination see exam HIGH  Clinical Presentation: MEDIUM  Clinical Decision Making : 
PTA    Certification Period: 6/5/2025-9/4/2024    Pavithra Gaytan, PT       6/5/2024       11:37 AM  ===================================================================  I certify that the above Therapy Services are being furnished while the patient is under my care. I agree with the treatment plan and certify that this therapy is necessary.    Physician's Signature:_________________________   DATE:_________   TIME:________                           Scott Thompson PA    ** Signature, Date and Time must be completed for valid certification **  Please sign and return to [facility] or you may fax the signed copy to [phone].  Thank you.

## 2024-06-06 ENCOUNTER — HOSPITAL ENCOUNTER (OUTPATIENT)
Facility: HOSPITAL | Age: 74
Setting detail: RECURRING SERIES
Discharge: HOME | End: 2024-06-09
Payer: MEDICARE

## 2024-06-06 PROCEDURE — 97530 THERAPEUTIC ACTIVITIES: CPT

## 2024-06-06 PROCEDURE — 97110 THERAPEUTIC EXERCISES: CPT

## 2024-06-06 NOTE — PROGRESS NOTES
PHYSICAL / OCCUPATIONAL THERAPY - DAILY TREATMENT NOTE (updated )    Patient Name: Micaela Zavala    Date: 2024    : 1950  Insurance: Payor: [facility] MEDICARE / Plan: [facility] MEDICARE ADVANTAGE / Product Type: *No Product type* /      Patient  verified yes     Visit #   Current / Total 2 8-24   Time   In / Out 152 240   Pain   In / Out 0 0   Subjective Functional Status/Changes: The hip doesn't hurt, though my knees do     TREATMENT AREA =  Right hip pain [M25.551]    Next PN/ RC due 24  Auth due EDMOND    OBJECTIVE    Therapeutic Procedures:  Tx Min Billable or 1:1 Min (if diff from Tx Min) Procedure, Rationale, Specifics     31202 Therapeutic Exercise (timed):  increase ROM, strength, coordination, balance, and proprioception to improve patient's ability to progress to PLOF and address remaining functional goals. (see flow sheet as applicable)     Details if applicable:       15  03097 Therapeutic Activity (timed):  use of dynamic activities replicating functional movements to increase ROM, strength, coordination, balance, and proprioception in order to improve patient's ability to progress to PLOF and address remaining functional goals.  (see flow sheet as applicable)     Details if applicable:     38  [facility] Totals Reminder: bill using total billable min of TIMED therapeutic procedures (example: do not include dry needle or estim unattended, both untimed codes, in totals to left)  8-22 min = 1 unit; 23-37 min = 2 units; 38-52 min = 3 units; 53-67 min = 4 units; 68-82 min = 5 units   Total Total     Ice (UNBILLED):  location/position: right hip, ice     Min Rationale   10 decrease inflammation and decrease pain to improve patient's ability to progress to PLOF and address remaining functional goals.     Skin assessment post-treatment:   Intact     [x]  Patient Education billed concurrently with other procedures   [x] Review HEP    [] Progressed/Changed HEP  [] Other:    Objective

## 2024-06-11 ENCOUNTER — HOSPITAL ENCOUNTER (OUTPATIENT)
Facility: HOSPITAL | Age: 74
Setting detail: RECURRING SERIES
Discharge: HOME | End: 2024-06-14
Payer: MEDICARE

## 2024-06-11 PROCEDURE — 97110 THERAPEUTIC EXERCISES: CPT

## 2024-06-11 PROCEDURE — 97530 THERAPEUTIC ACTIVITIES: CPT

## 2024-06-11 NOTE — PROGRESS NOTES
PHYSICAL / OCCUPATIONAL THERAPY - DAILY TREATMENT NOTE (updated )    Patient Name: Micaela Zavala    Date: 2024    : 1950  Insurance: Payor: [facility] MEDICARE / Plan: [facility] MEDICARE ADVANTAGE / Product Type: *No Product type* /      Patient  verified yes     Visit #   Current / Total 3 8-36   Time   In / Out 10:47 11:32   Pain   In / Out 3/10 2/10   Subjective Functional Status/Changes: Pt reports increased discomfort coming to therapy today following increased walking yesterday. \"Pain is in back of leg and a little in the low back\".      TREATMENT AREA =  Right hip pain [M25.551]    Next PN/ RC due 24  Auth due EDMOND    OBJECTIVE    Therapeutic Procedures:  Tx Min Billable or 1:1 Min (if diff from Tx Min) Procedure, Rationale, Specifics   25  81270 Therapeutic Exercise (timed):  increase ROM, strength, coordination, balance, and proprioception to improve patient's ability to progress to PLOF and address remaining functional goals. (see flow sheet as applicable)     Details if applicable:       15  51327 Therapeutic Activity (timed):  use of dynamic activities replicating functional movements to increase ROM, strength, coordination, balance, and proprioception in order to improve patient's ability to progress to PLOF and address remaining functional goals.  (see flow sheet as applicable)     Details if applicable:     40  [facility] Totals Reminder: bill using total billable min of TIMED therapeutic procedures (example: do not include dry needle or estim unattended, both untimed codes, in totals to left)  8-22 min = 1 unit; 23-37 min = 2 units; 38-52 min = 3 units; 53-67 min = 4 units; 68-82 min = 5 units   Total Total     Ice (UNBILLED):  location/position: R hip     Min Rationale   5 decrease inflammation, decrease pain, increase tissue extensibility, and increase muscle contraction/control to improve patient's ability to progress to PLOF and address remaining functional goals.     Skin assessment 
Measures/Assessment    Initiated treatment protocol per POC.      Patient will continue to benefit from skilled PT services to modify and progress therapeutic interventions, analyze and address functional mobility deficits, analyze and address ROM deficits, analyze and address strength deficits, analyze and address soft tissue restrictions, analyze and cue for proper movement patterns, and instruct in home and community integration to address functional deficits and attain remaining goals.    Progress toward goals / Updated goals:  []  See Progress Note/Recertification    Short Term Goals: To be accomplished in 2-6 weeks   Patient will demonstrate compliance with HEP for symptom management and improved strength.  Status at IE Initiated        PLAN  yes Continue plan of care  []  Upgrade activities as tolerated  []  Discharge: See DC Note  []  Other:        Future Appointments   Date Time Provider Department Center   6/11/2024 10:40 AM Claudette Kaufman PT [facility]   6/13/2024 10:40 AM Dorene Napier, PTA [facility]   6/18/2024 10:00 AM Ang So, PT [facility]   6/20/2024 10:00 AM Ang So PT [facility]   6/25/2024 11:20 AM Dorene Napier, PTA [facility]   6/26/2024  2:00 PM Dorene Napier, PTA [facility]   7/29/2024 11:30 AM [facility] DAVION STEREO BX RM 1 [facility]

## 2024-06-13 ENCOUNTER — HOSPITAL ENCOUNTER (OUTPATIENT)
Facility: HOSPITAL | Age: 74
Setting detail: RECURRING SERIES
Discharge: HOME | End: 2024-06-16
Payer: MEDICARE

## 2024-06-13 PROCEDURE — 97110 THERAPEUTIC EXERCISES: CPT

## 2024-06-13 PROCEDURE — 97530 THERAPEUTIC ACTIVITIES: CPT

## 2024-06-13 PROCEDURE — 97112 NEUROMUSCULAR REEDUCATION: CPT

## 2024-06-13 NOTE — PROGRESS NOTES
applicable)     Details if applicable:  (I) for HK amb x 60'   (I) with SS x 30'   (Supervision> mod I) for retro amb x 60'        41 41 [facility] Totals Reminder: bill using total billable min of TIMED therapeutic procedures (example: do not include dry needle or estim unattended, both untimed codes, in totals to left)  8-22 min = 1 unit; 23-37 min = 2 units; 38-52 min = 3 units; 53-67 min = 4 units; 68-82 min = 5 units   Total Total     [x]  Patient Education billed concurrently with other procedures   [x] Review HEP    [] Progressed/Changed HEP, detail:    [] Other detail:  Pt ed on the benefits and importance of compliance with HEP    Objective Information/Functional Measures/Assessment  Required min/Mod VCs + demo to perform proper technique and for safety with TE  Pt demonstrated minimal apprehension during movements without c/o increase p!    Performed standing marches with no UE without LOB    Vcs to increase CHER for squats, requires min UE assist, demos proper p! Free tech    VCs to decrease knee anterior translation with lateral step ups on 6''      Patient will continue to benefit from skilled PT / OT services to modify and progress therapeutic interventions, analyze and address functional mobility deficits, analyze and address ROM deficits, analyze and address strength deficits, analyze and address soft tissue restrictions, analyze and cue for proper movement patterns, analyze and modify for postural abnormalities, and instruct in home and community integration to address functional deficits and attain remaining goals.    Progress toward goals / Updated goals:  []  See Progress Note/Recertification  Short Term Goals: To be accomplished in 2-6 weeks   Patient will demonstrate compliance with HEP for symptom management and improved strength. Established HEP, issued RTB for HEP  Status at IE Initiated    PLAN  Yes  Continue plan of care  [x]  Upgrade activities as tolerated  []  Discharge due to :  []

## 2024-06-18 ENCOUNTER — HOSPITAL ENCOUNTER (OUTPATIENT)
Facility: HOSPITAL | Age: 74
Setting detail: RECURRING SERIES
Discharge: HOME | End: 2024-06-21
Payer: MEDICARE

## 2024-06-18 PROCEDURE — 97110 THERAPEUTIC EXERCISES: CPT

## 2024-06-18 PROCEDURE — 97112 NEUROMUSCULAR REEDUCATION: CPT

## 2024-06-18 PROCEDURE — 97530 THERAPEUTIC ACTIVITIES: CPT

## 2024-06-18 NOTE — PROGRESS NOTES
PHYSICAL / OCCUPATIONAL THERAPY - DAILY TREATMENT NOTE (updated )    Patient Name: Micaela Zavala    Date: 2024    : 1950  Insurance: Payor: [facility] MEDICARE / Plan: [facility] MEDICARE ADVANTAGE / Product Type: *No Product type* /      Patient  verified yes     Visit #   Current / Total 3 8-24   Time   In / Out 955 1045   Pain   In / Out 2-3 0   Subjective Functional Status/Changes: I felt great all day yesterday bu not so much today. I did a lot of walking around and moved some things around the deck. I hadn't done much since my surgery. I'm glad to be in here to do some exercises. I hate regression and want to be able to always move forward.      TREATMENT AREA =  Right hip pain [M25.551]    Next PN/ RC due 24  Auth due EDMOND    OBJECTIVE    Therapeutic Procedures:  Tx Min Billable or 1:1 Min (if diff from Tx Min)  Procedure, Rationale, Specifics   15             15 67604 Therapeutic Exercise (timed):  increase ROM, strength, coordination, balance, and proprioception to improve patient's ability to progress to PLOF and address remaining functional goals. (see flow sheet as applicable)     Details if applicable:       15             15 15296 Therapeutic Activity (timed):  use of dynamic activities replicating functional movements to increase ROM, strength, coordination, balance, and proprioception in order to improve patient's ability to progress to PLOF and address remaining functional goals.  (see flow sheet as applicable)     Details if applicable:     10              8 44979 Neuromuscular Re-Education (timed):  improve balance, coordination, kinesthetic sense, posture, core stability and proprioception to improve patient's ability to develop conscious control of individual muscles and awareness of position of extremities in order to progress to PLOF and address remaining functional goals. (see flow sheet as applicable)     Details if applicable:     40           38 [facility] Totals Reminder: bill

## 2024-06-20 ENCOUNTER — HOSPITAL ENCOUNTER (OUTPATIENT)
Facility: HOSPITAL | Age: 74
Setting detail: RECURRING SERIES
Discharge: HOME | End: 2024-06-23
Payer: MEDICARE

## 2024-06-20 PROCEDURE — 97530 THERAPEUTIC ACTIVITIES: CPT

## 2024-06-20 PROCEDURE — 97110 THERAPEUTIC EXERCISES: CPT

## 2024-06-20 PROCEDURE — 97112 NEUROMUSCULAR REEDUCATION: CPT

## 2024-06-20 NOTE — PROGRESS NOTES
PHYSICAL / OCCUPATIONAL THERAPY - DAILY TREATMENT NOTE (updated )    Patient Name: Micaela Zavala    Date: 2024    : 1950  Insurance: Payor: [facility] MEDICARE / Plan: [facility] MEDICARE ADVANTAGE / Product Type: *No Product type* /      Patient  verified yes     Visit #   Current / Total 6 8-36   Time   In / Out 10:10 10:50   Pain   In / Out 2/10 0/10   Subjective Functional Status/Changes: \"I have a lot of stiffness today, but usually feels better after therapy. I have been keeping up with my home exercises, but didn't do them yesterday because I was tired from doing housework and moving things around.\"      TREATMENT AREA =  Right hip pain [M25.551]    Next PN/ RC due 24  Auth due EDMOND    OBJECTIVE    Therapeutic Procedures:  Tx Min Billable or 1:1 Min (if diff from Tx Min) Procedure, Rationale, Specifics   22  71040 Therapeutic Exercise (timed):  increase ROM, strength, coordination, balance, and proprioception to improve patient's ability to progress to PLOF and address remaining functional goals. (see flow sheet as applicable)     Details if applicable:       10  40172 Therapeutic Activity (timed):  use of dynamic activities replicating functional movements to increase ROM, strength, coordination, balance, and proprioception in order to improve patient's ability to progress to PLOF and address remaining functional goals.  (see flow sheet as applicable)     Details if applicable:     8  07660 Neuromuscular Re-Education (timed):  improve balance, coordination, kinesthetic sense, posture, core stability and proprioception to improve patient's ability to develop conscious control of individual muscles and awareness of position of extremities in order to progress to PLOF and address remaining functional goals. (see flow sheet as applicable)     Details if applicable:     40  [facility] Totals Reminder: bill using total billable min of TIMED therapeutic procedures (example: do not include dry needle or

## 2024-06-24 ENCOUNTER — TELEPHONE (OUTPATIENT)
Age: 74
End: 2024-06-24

## 2024-06-24 RX ORDER — CARVEDILOL 6.25 MG/1
TABLET ORAL
Qty: 180 TABLET | Refills: 2 | Status: SHIPPED | OUTPATIENT
Start: 2024-06-24

## 2024-06-24 NOTE — TELEPHONE ENCOUNTER
Patient was called back.  Dr. Deleon was consulted and she stated that yes he can get the back epidural but he has to ave it done at least 2 weeks before surgery.  He was understanding of this but he will probably after to wait for after surgery as his regular provider has retired.  He need an updated MRI and that is scheduled for 3/29/21.     His pre operative questions were answered and he has the number for the Covid scheduling line if they do not reach out to him with in the next little bit.  He was thankful for the call.     See refill request

## 2024-06-24 NOTE — TELEPHONE ENCOUNTER
Incoming from patient. Two patient identifiers confirmed. Patient requesting medication refill.    PCP: Efren Olivo MD  Last appt:  4/25/2024   Future Appointments   Date Time Provider Department Center   6/25/2024 11:20 AM HenDorene mckeon, PTA [facility]   6/26/2024  2:00 PM Dorene Napier, PTA [facility]   7/3/2024 10:40 AM Dorene Napier, PTA [facility]   7/9/2024 10:40 AM Claudette Kaufman, PT [facility]   7/11/2024 10:40 AM Ang So, PT [facility]   7/16/2024 10:40 AM Claudette Kaufman, PT [facility]   7/18/2024 10:40 AM Ang So, PT [facility]   7/23/2024 10:40 AM Claudette Kaufman, PT [facility]   7/25/2024 10:40 AM Dorene Napier, PTA [facility]   7/30/2024 10:40 AM Claudette Kaufman, PT [facility]   7/31/2024 10:40 AM Ang So, PT [facility]       Medication requested: carvedilol 6.25mg    [facility]

## 2024-06-24 NOTE — TELEPHONE ENCOUNTER
PCP: Efren Olivo MD    Last appt:  2024   Future Appointments   Date Time Provider Department Center   2024 11:20 AM Dorene Napier, PTA [facility]   2024  2:00 PM Dorene Napier, PTA [facility]   7/3/2024 10:40 AM Dorene Napier, PTA [facility]   2024 10:40 AM Claudette Kaufman, PT [facility]   2024 10:40 AM Ang So, PT [facility]   2024 10:40 AM Claudette Kaufman, PT [facility]   2024 10:40 AM Ang So, PT [facility]   2024 10:40 AM Claudette Kaufman, PT [facility]   2024 10:40 AM Dorene Napier, PTA [facility]   2024 10:40 AM Claudette Kaufman, PT [facility]   2024 10:40 AM Ang So, PT [facility]       Requested Prescriptions     Pending Prescriptions Disp Refills    carvedilol (COREG) 6.25 MG tablet 180 tablet 2     Si.5 tablets 2 times daily       Request for a 30 or 90 day supply? Provider Discretion    Pharmacy: confirmed     Other Comments:n/a

## 2024-06-25 ENCOUNTER — HOSPITAL ENCOUNTER (OUTPATIENT)
Facility: HOSPITAL | Age: 74
Setting detail: RECURRING SERIES
Discharge: HOME | End: 2024-06-28
Payer: MEDICARE

## 2024-06-25 PROCEDURE — 97112 NEUROMUSCULAR REEDUCATION: CPT

## 2024-06-25 PROCEDURE — 97110 THERAPEUTIC EXERCISES: CPT

## 2024-06-25 PROCEDURE — 97530 THERAPEUTIC ACTIVITIES: CPT

## 2024-06-25 NOTE — PROGRESS NOTES
PHYSICAL  DAILY TREATMENT NOTE     Patient Name: Micaela Zavala    Date: 2024    : 1950  Insurance: Payor: [facility] MEDICARE / Plan: [facility] MEDICARE ADVANTAGE / Product Type: *No Product type* /      Patient  verified Yes     Visit #   Current / Total 7 8-36   Time   In / Out 1120 1200   Pain   In / Out 0/10  010   Subjective Functional Status/Changes: \"I was able to go walking in the store for a hour and it was without cart. I also went to [employer] and walked 45 mins\"  Pt reports she has difficulty initially getting up and taking first few steps, she said she      Next PN/ RC due 24  Auth due EDMOND    TREATMENT AREA =  Right hip pain [M25.551]    OBJECTIVE      Modalities Rationale:     decrease inflammation and decrease pain to improve patient's ability to progress to PLOF and address remaining functional goals.    10 min  unbill []  Ice     []  Heat    location/position: Supine with wedge under B Les     Skin assessment post-treatment:   Intact         Therapeutic Procedures:    Tx Min Billable or 1:1 Min (if diff from Tx Min) Procedure, Rationale, Specifics   10  26630 Therapeutic Exercise (timed):  increase ROM, strength, coordination, balance, and proprioception to improve patient's ability to progress to PLOF and address remaining functional goals. (see flow sheet as applicable)     Details if applicable:       10  12842 Neuromuscular Re-Education (timed):  improve balance, coordination, kinesthetic sense, posture, core stability and proprioception to improve patient's ability to develop conscious control of individual muscles and awareness of position of extremities in order to progress to PLOF and address remaining functional goals. (see flow sheet as applicable)     Details if applicable:     20  79814 Therapeutic Activity (timed):  use of dynamic activities replicating functional movements to increase ROM, strength, coordination, balance, and proprioception in order to improve patient's

## 2024-06-26 ENCOUNTER — HOSPITAL ENCOUNTER (OUTPATIENT)
Facility: HOSPITAL | Age: 74
Setting detail: RECURRING SERIES
Discharge: HOME | End: 2024-06-29
Payer: MEDICARE

## 2024-06-26 PROCEDURE — 97110 THERAPEUTIC EXERCISES: CPT

## 2024-06-26 PROCEDURE — 97112 NEUROMUSCULAR REEDUCATION: CPT

## 2024-06-26 PROCEDURE — 97530 THERAPEUTIC ACTIVITIES: CPT

## 2024-06-26 NOTE — PROGRESS NOTES
PHYSICAL  DAILY TREATMENT NOTE     Patient Name: Micaela Zavala    Date: 2024    : 1950  Insurance: Payor: [facility] MEDICARE / Plan: [facility] MEDICARE ADVANTAGE / Product Type: *No Product type* /      Patient  verified Yes     Visit #   Current / Total 8 8-36   Time   In / Out 205 259   Pain   In / Out 0/10  010   Subjective Functional Status/Changes:  Pt reports Not too sore from day of PT prior   Pt stated she could tell her legs were working hard      Next PN/ RC due 24  Auth due EDMOND    TREATMENT AREA =  Right hip pain [M25.551]    OBJECTIVE      Modalities Rationale:     decrease inflammation and decrease pain to improve patient's ability to progress to PLOF and address remaining functional goals.    10 min  unbill [x]  Ice     []  Heat    location/position: Supine with wedge under B Les     Skin assessment post-treatment:   Intact         Therapeutic Procedures:    Tx Min Billable or 1:1 Min (if diff from Tx Min) Procedure, Rationale, Specifics   10  87436 Therapeutic Exercise (timed):  increase ROM, strength, coordination, balance, and proprioception to improve patient's ability to progress to PLOF and address remaining functional goals. (see flow sheet as applicable)     Details if applicable:       10  37028 Neuromuscular Re-Education (timed):  improve balance, coordination, kinesthetic sense, posture, core stability and proprioception to improve patient's ability to develop conscious control of individual muscles and awareness of position of extremities in order to progress to PLOF and address remaining functional goals. (see flow sheet as applicable)     Details if applicable:     24  71614 Therapeutic Activity (timed):  use of dynamic activities replicating functional movements to increase ROM, strength, coordination, balance, and proprioception in order to improve patient's ability to progress to PLOF and address remaining functional goals.  (see flow sheet as applicable)     Details if

## 2024-06-27 ENCOUNTER — APPOINTMENT (OUTPATIENT)
Facility: HOSPITAL | Age: 74
End: 2024-06-27
Payer: MEDICARE

## 2024-07-02 ENCOUNTER — APPOINTMENT (OUTPATIENT)
Facility: HOSPITAL | Age: 74
End: 2024-07-02
Payer: MEDICARE

## 2024-07-03 ENCOUNTER — HOSPITAL ENCOUNTER (OUTPATIENT)
Facility: HOSPITAL | Age: 74
Setting detail: RECURRING SERIES
Discharge: HOME | End: 2024-07-06
Payer: MEDICARE

## 2024-07-03 PROCEDURE — 97530 THERAPEUTIC ACTIVITIES: CPT

## 2024-07-03 PROCEDURE — 97140 MANUAL THERAPY 1/> REGIONS: CPT

## 2024-07-03 PROCEDURE — 97110 THERAPEUTIC EXERCISES: CPT

## 2024-07-03 NOTE — PROGRESS NOTES
PHYSICAL  DAILY TREATMENT NOTE     Patient Name: Micaela Zavala    Date: 7/3/2024    : 1950  Insurance: Payor: [facility] MEDICARE / Plan: [facility] MEDICARE ADVANTAGE / Product Type: *No Product type* /      Patient  verified Yes     Visit #   Current / Total 9 8-36   Time   In / Out 1040 1135   Pain   In / Out 0/10  010   Subjective Functional Status/Changes:  \"I am still having trouble bending over. I feel better walking my 17 steps\"     Next PN/ RC due 24  Auth due EDMOND    TREATMENT AREA =  Right hip pain [M25.551]    OBJECTIVE      Modalities Rationale:     decrease inflammation and decrease pain to improve patient's ability to progress to PLOF and address remaining functional goals.    10 min  unbill [x]  Ice     []  Heat    location/position: Supine with wedge under B Les     Skin assessment post-treatment:   Intact         Therapeutic Procedures:    Tx Min Billable or 1:1 Min (if diff from Tx Min) Procedure, Rationale, Specifics   11  14617 Therapeutic Exercise (timed):  increase ROM, strength, coordination, balance, and proprioception to improve patient's ability to progress to PLOF and address remaining functional goals. (see flow sheet as applicable)     Details if applicable:       10  46504 Neuromuscular Re-Education (timed):  improve balance, coordination, kinesthetic sense, posture, core stability and proprioception to improve patient's ability to develop conscious control of individual muscles and awareness of position of extremities in order to progress to PLOF and address remaining functional goals. (see flow sheet as applicable)     Details if applicable:     24  83298 Therapeutic Activity (timed):  use of dynamic activities replicating functional movements to increase ROM, strength, coordination, balance, and proprioception in order to improve patient's ability to progress to PLOF and address remaining functional goals.  (see flow sheet as applicable)     Details if applicable:  (I) for HK

## 2024-07-09 ENCOUNTER — HOSPITAL ENCOUNTER (OUTPATIENT)
Facility: HOSPITAL | Age: 74
Setting detail: RECURRING SERIES
Discharge: HOME | End: 2024-07-12
Payer: MEDICARE

## 2024-07-09 PROCEDURE — 97530 THERAPEUTIC ACTIVITIES: CPT

## 2024-07-09 PROCEDURE — 97112 NEUROMUSCULAR REEDUCATION: CPT

## 2024-07-09 NOTE — PROGRESS NOTES
[facility]  [address] [place] [address]   Phone: [phone] Fax: [phone]  PROGRESS NOTE  Patient Name: Micaela Zavala : 1950   Treatment/Medical Diagnosis: Right hip pain [M25.551] Right hip pain [M25.551]   Referral Source: Scott Thompson PA     Date of Initial Visit: 2024 Attended Visits: 10 Missed Visits: 0     SUMMARY OF TREATMENT  Patient's POC has consisted of therex, therapeutic activities, manual therapy prn, modalities prn, pt. education, and a comprehensive HEP. Treatment strategies used to address functional mobility deficits, ROM deficits, strength deficits, analyze and address soft tissue restrictions, analyze and cue movement patterns, analyze and modify body mechanics/ergonomics, assess and modify postural abnormalities and instruct in home and community integration.      CURRENT STATUS  Short Term Goals:    Patient will demonstrate compliance with HEP for symptom management and improved strength. MET  Status at IE Initiated    Long Term Goals: To be accomplished in 6-8 weeks   Patient will demonstrate independence with HEP for independent symptom management and maintenance of therapy gains upon discharge.  Status at IE Initiated  2.  Patient will demonstrate 5/5 B hip flexion strength to facilitate gait and stair negotiation.  Status at IE 4/5  3.  Patient will demonstrate 5/5 B hip abduction strength to facilitate gait and stair negotiation.  Status at IE 4/5  4.  Patient will score greater than or equal to 37/80 on LEFS to indicate improved activity tolerance and overall function.  Status at IE 28/80    RECOMMENDATIONS  Pt has met short term goals. Her new goals address hip strength, as pt still struggles pain and form with with stairs and walking. She requires skilled PT to negotiate stairs and walk > 20 minutes without pain.     If you have any questions/comments please contact us directly at 
Patient Education billed concurrently with other procedures   [x] Review HEP    [] Progressed/Changed HEP, detail:    [] Other detail:  Pt ed on the benefits and importance of compliance with HEP    Objective Information/Functional Measures/Assessment  Difficulty with activating hip abd/ER during lateral step ups, compensates with pelvic ER or knee valgus, better after VC. Pt had + LLD of 1 cm, longer on R vs L.       Patient will continue to benefit from skilled PT / OT services to modify and progress therapeutic interventions, analyze and address functional mobility deficits, analyze and address ROM deficits, analyze and address strength deficits, analyze and address soft tissue restrictions, analyze and cue for proper movement patterns, analyze and modify for postural abnormalities, and instruct in home and community integration to address functional deficits and attain remaining goals.    Progress toward goals / Updated goals:  []  See Progress Note/Recertification  Short Term Goals: To be accomplished in 2-6 weeks   Patient will demonstrate compliance with HEP for symptom management and improved strength. MET  Status at IE Initiated     PLAN  Yes  Continue plan of care  [x]  Upgrade activities as tolerated  []  Discharge due to :  []  Other:    Claudette Kaufman PT    7/9/2024    8:05 AM    Future Appointments   Date Time Provider Department Center   7/9/2024 10:40 AM Claudette Kaufman PT [facility]   7/11/2024 10:40 AM Ang So, PT [facility]   7/16/2024 10:40 AM Claudette Kaufman, PT [facility]   7/18/2024 10:40 AM Ang So, PT [facility]   7/23/2024 10:40 AM Claudette Kaufman PT [facility]   7/25/2024 10:40 AM Dorene Napier, PTA [facility]   7/30/2024 10:40 AM Claudette Kaufman PT [facility]   7/31/2024 10:40 AM Ang So, PT [facility]   xc

## 2024-07-11 ENCOUNTER — HOSPITAL ENCOUNTER (OUTPATIENT)
Facility: HOSPITAL | Age: 74
Setting detail: RECURRING SERIES
Discharge: HOME | End: 2024-07-14
Payer: MEDICARE

## 2024-07-11 PROCEDURE — 97530 THERAPEUTIC ACTIVITIES: CPT

## 2024-07-11 PROCEDURE — 97110 THERAPEUTIC EXERCISES: CPT

## 2024-07-11 NOTE — PROGRESS NOTES
PHYSICAL / OCCUPATIONAL THERAPY - DAILY TREATMENT NOTE (updated )    Patient Name: Micaela Zavala    Date: 2024    : 1950  Insurance: Payor: [facility] MEDICARE / Plan: [facility] MEDICARE ADVANTAGE / Product Type: *No Product type* /      Patient  verified yes     Visit #   Current / Total 11 36   Time   In / Out 10:40 11:20   Pain   In / Out 0/10 0/10   Subjective Functional Status/Changes: Pt reports bilateral hip and knee pain last night following moving heavy pots around home. She states she took OTC pain medication and woke up with no pain this morning. Pt states her biggest problem at this point is bending to work on the ground or  items from the floor. \"I feel a pulling/tightness in the back of my legs/butt when bending forward, and my knees hurt as I try to go down\",      TREATMENT AREA =  Right hip pain [M25.551]    Next PN/ RC due 24  Auth due EDMOND    OBJECTIVE    Therapeutic Procedures:  Tx Min Billable or 1:1 Min (if diff from Tx Min) Procedure, Rationale, Specifics   25  84958 Therapeutic Exercise (timed):  increase ROM, strength, coordination, balance, and proprioception to improve patient's ability to progress to PLOF and address remaining functional goals. (see flow sheet as applicable)     Details if applicable:       15  93597 Therapeutic Activity (timed):  use of dynamic activities replicating functional movements to increase ROM, strength, coordination, balance, and proprioception in order to improve patient's ability to progress to PLOF and address remaining functional goals.  (see flow sheet as applicable)     Details if applicable:     40  [facility] Totals Reminder: bill using total billable min of TIMED therapeutic procedures (example: do not include dry needle or estim unattended, both untimed codes, in totals to left)  8-22 min = 1 unit; 23-37 min = 2 units; 38-52 min = 3 units; 53-67 min = 4 units; 68-82 min = 5 units   Total Total     [x]  Patient Education billed

## 2024-07-16 ENCOUNTER — HOSPITAL ENCOUNTER (OUTPATIENT)
Facility: HOSPITAL | Age: 74
Setting detail: RECURRING SERIES
Discharge: HOME | End: 2024-07-19
Payer: MEDICARE

## 2024-07-16 PROCEDURE — 97530 THERAPEUTIC ACTIVITIES: CPT

## 2024-07-16 PROCEDURE — 97112 NEUROMUSCULAR REEDUCATION: CPT

## 2024-07-16 PROCEDURE — 97110 THERAPEUTIC EXERCISES: CPT

## 2024-07-16 NOTE — PROGRESS NOTES
PHYSICAL / OCCUPATIONAL THERAPY - DAILY TREATMENT NOTE (updated )    Patient Name: Micaela Zavala    Date: 2024    : 1950  Insurance: Payor: [facility] MEDICARE / Plan: [facility] MEDICARE ADVANTAGE / Product Type: *No Product type* /      Patient  verified yes     Visit #   Current / Total 12 35   Time   In / Out 10:45 11:30   Pain   In / Out 1/10 1/10       Subjective Functional Status/Changes: Pt reports L knee pain and R posterior thigh pain today. Again she was moving heavy potted plants around her home. The pain behind her R thigh she later described as tightness.      TREATMENT AREA =  Right hip pain [M25.551]    Next PN/ RC due 24  Auth due EDMOND    OBJECTIVE    Therapeutic Procedures:  Tx Min Billable or 1:1 Min (if diff from Tx Min) Procedure, Rationale, Specifics   17  81179 Therapeutic Exercise (timed):  increase ROM, strength, coordination, balance, and proprioception to improve patient's ability to progress to PLOF and address remaining functional goals. (see flow sheet as applicable)     Details if applicable:       20  71616 Therapeutic Activity (timed):  use of dynamic activities replicating functional movements to increase ROM, strength, coordination, balance, and proprioception in order to improve patient's ability to progress to PLOF and address remaining functional goals.  (see flow sheet as applicable)     Details if applicable:     8  04438 Neuromuscular Re-Education (timed):  improve balance, coordination, kinesthetic sense, posture, core stability and proprioception to improve patient's ability to develop conscious control of individual muscles and awareness of position of extremities in order to progress to PLOF and address remaining functional goals. (see flow sheet as applicable)     Details if applicable:     45  [facility] Totals Reminder: bill using total billable min of TIMED therapeutic procedures (example: do not include dry needle or estim unattended, both untimed codes, in

## 2024-07-17 ENCOUNTER — HOSPITAL ENCOUNTER (OUTPATIENT)
Facility: HOSPITAL | Age: 74
Setting detail: RECURRING SERIES
Discharge: HOME | End: 2024-07-20
Payer: MEDICARE

## 2024-07-17 PROCEDURE — 97530 THERAPEUTIC ACTIVITIES: CPT

## 2024-07-17 PROCEDURE — 97110 THERAPEUTIC EXERCISES: CPT

## 2024-07-17 PROCEDURE — 97112 NEUROMUSCULAR REEDUCATION: CPT

## 2024-07-18 ENCOUNTER — APPOINTMENT (OUTPATIENT)
Facility: HOSPITAL | Age: 74
End: 2024-07-18
Payer: MEDICARE

## 2024-07-23 ENCOUNTER — HOSPITAL ENCOUNTER (OUTPATIENT)
Facility: HOSPITAL | Age: 74
Setting detail: RECURRING SERIES
Discharge: HOME | End: 2024-07-26
Payer: MEDICARE

## 2024-07-23 PROCEDURE — 97530 THERAPEUTIC ACTIVITIES: CPT

## 2024-07-23 PROCEDURE — 97110 THERAPEUTIC EXERCISES: CPT

## 2024-07-23 NOTE — PROGRESS NOTES
PHYSICAL / OCCUPATIONAL THERAPY - DAILY TREATMENT NOTE (updated )    Patient Name: Micaela Zavala    Date: 2024    : 1950  Insurance: Payor: [facility] MEDICARE / Plan: [facility] MEDICARE ADVANTAGE / Product Type: *No Product type* /      Patient  verified yes     Visit #   Current / Total 14 8-36   Time   In / Out 10:45 11:25   Pain   In / Out 0/10 0/10   Subjective Functional Status/Changes: Pt reports feeling stiff today and all weekend, but states the exercises help with this. Her left knee is the only thing in any pain today, she states. Pt also reports her heart rate is elevated today.      TREATMENT AREA =  Right hip pain [M25.551]    Next PN/ RC due 24  Auth due EDMOND    OBJECTIVE    Therapeutic Procedures:  Tx Min Billable or 1:1 Min (if diff from Tx Min) Procedure, Rationale, Specifics   17  28711 Therapeutic Exercise (timed):  increase ROM, strength, coordination, balance, and proprioception to improve patient's ability to progress to PLOF and address remaining functional goals. (see flow sheet as applicable)     Details if applicable:         03179 Therapeutic Activity (timed):  use of dynamic activities replicating functional movements to increase ROM, strength, coordination, balance, and proprioception in order to improve patient's ability to progress to PLOF and address remaining functional goals.  (see flow sheet as applicable)     Details if applicable:     40  [facility] Totals Reminder: bill using total billable min of TIMED therapeutic procedures (example: do not include dry needle or estim unattended, both untimed codes, in totals to left)  8-22 min = 1 unit; 23-37 min = 2 units; 38-52 min = 3 units; 53-67 min = 4 units; 68-82 min = 5 units   Total Total       [x]  Patient Education billed concurrently with other procedures   [x] Review HEP    [] Progressed/Changed HEP  [] Other:    Objective Information/Functional Measures/Assessment    Pt tolerated treatment well, min c/o L knee

## 2024-07-25 ENCOUNTER — HOSPITAL ENCOUNTER (OUTPATIENT)
Facility: HOSPITAL | Age: 74
Setting detail: RECURRING SERIES
Discharge: HOME | End: 2024-07-28
Payer: MEDICARE

## 2024-07-25 PROCEDURE — 97110 THERAPEUTIC EXERCISES: CPT

## 2024-07-25 PROCEDURE — 97530 THERAPEUTIC ACTIVITIES: CPT

## 2024-07-25 PROCEDURE — 97112 NEUROMUSCULAR REEDUCATION: CPT

## 2024-07-25 NOTE — PROGRESS NOTES
PHYSICAL  DAILY TREATMENT NOTE     Patient Name: Micaela Zavala    Date: 2024    : 1950  Insurance: Payor: [facility] MEDICARE / Plan: [facility] MEDICARE ADVANTAGE / Product Type: *No Product type* /      Patient  verified Yes     Visit #   Current / Total 15 8-36   Time   In / Out 1040 1132   Pain   In / Out 0/10  0/10   Subjective Functional Status/Changes:  \"This left knee is a little pain but its not bad, maybe 1/10, just working it\"     Next PN/ RC due 24  Auth due EDMOND    TREATMENT AREA =  Right hip pain [M25.551]    OBJECTIVE      Modalities Rationale:  PD    Therapeutic Procedures:    Tx Min Billable or 1:1 Min (if diff from Tx Min) Procedure, Rationale, Specifics   15 15 23917 Therapeutic Exercise (timed):  increase ROM, strength, coordination, balance, and proprioception to improve patient's ability to progress to PLOF and address remaining functional goals. (see flow sheet as applicable)     Details if applicable:       10 10 71043 Neuromuscular Re-Education (timed):  improve balance, coordination, kinesthetic sense, posture, core stability and proprioception to improve patient's ability to develop conscious control of individual muscles and awareness of position of extremities in order to progress to PLOF and address remaining functional goals. (see flow sheet as applicable)     Details if applicable:      48247 Therapeutic Activity (timed):  use of dynamic activities replicating functional movements to increase ROM, strength, coordination, balance, and proprioception in order to improve patient's ability to progress to PLOF and address remaining functional goals.  (see flow sheet as applicable)     Details if applicable:    (I) with SS x 60'  with RTB   52 42 [facility] Totals Reminder: bill using total billable min of TIMED therapeutic procedures (example: do not include dry needle or estim unattended, both untimed codes, in totals to left)  8-22 min = 1 unit; 23-37 min = 2 units; 38-52 min =

## 2024-07-30 ENCOUNTER — HOSPITAL ENCOUNTER (OUTPATIENT)
Facility: HOSPITAL | Age: 74
Setting detail: RECURRING SERIES
Discharge: HOME | End: 2024-08-02
Payer: MEDICARE

## 2024-07-30 PROCEDURE — 97530 THERAPEUTIC ACTIVITIES: CPT

## 2024-07-30 PROCEDURE — 97110 THERAPEUTIC EXERCISES: CPT

## 2024-07-30 NOTE — PROGRESS NOTES
PHYSICAL / OCCUPATIONAL THERAPY - DAILY TREATMENT NOTE (updated )    Patient Name: Micaela Zavala    Date: 2024    : 1950  Insurance: Payor: [facility] MEDICARE / Plan: [facility] MEDICARE ADVANTAGE / Product Type: *No Product type* /      Patient  verified yes     Visit #   Current / Total 16 36   Time   In / Out 10:41 11:24   Pain   In / Out 0/10 0/10   Subjective Functional Status/Changes: \"Was in a little discomfort this morning in the back of my left leg, but I'm doing okay now. A little stiff but no pain.\"     TREATMENT AREA =  Right hip pain [M25.551]    Next PN/ RC due 24  Auth due EDMOND    OBJECTIVE    Therapeutic Procedures:  Tx Min Billable or 1:1 Min (if diff from Tx Min) Procedure, Rationale, Specifics   18  74648 Therapeutic Exercise (timed):  increase ROM, strength, coordination, balance, and proprioception to improve patient's ability to progress to PLOF and address remaining functional goals. (see flow sheet as applicable)     Details if applicable:       25  82843 Therapeutic Activity (timed):  use of dynamic activities replicating functional movements to increase ROM, strength, coordination, balance, and proprioception in order to improve patient's ability to progress to PLOF and address remaining functional goals.  (see flow sheet as applicable)     Details if applicable:     43  [facility] Totals Reminder: bill using total billable min of TIMED therapeutic procedures (example: do not include dry needle or estim unattended, both untimed codes, in totals to left)  8-22 min = 1 unit; 23-37 min = 2 units; 38-52 min = 3 units; 53-67 min = 4 units; 68-82 min = 5 units   Total Total         [x]  Patient Education billed concurrently with other procedures   [x] Review HEP    [] Progressed/Changed HEP  [] Other:    Objective Information/Functional Measures/Assessment    Pt tolerated treatment well, min c/o left knee pain with KB deadlift which eased with conclusion of each set. Pt states hamstring

## 2024-07-31 ENCOUNTER — HOSPITAL ENCOUNTER (OUTPATIENT)
Facility: HOSPITAL | Age: 74
Setting detail: RECURRING SERIES
Discharge: HOME | End: 2024-08-03
Payer: MEDICARE

## 2024-07-31 PROCEDURE — 97110 THERAPEUTIC EXERCISES: CPT

## 2024-07-31 PROCEDURE — 97530 THERAPEUTIC ACTIVITIES: CPT

## 2024-07-31 PROCEDURE — 97112 NEUROMUSCULAR REEDUCATION: CPT

## 2024-07-31 NOTE — PROGRESS NOTES
PHYSICAL  DAILY TREATMENT NOTE     Patient Name: Micaela Zavala    Date: 2024    : 1950  Insurance: Payor: [facility] MEDICARE / Plan: [facility] MEDICARE ADVANTAGE / Product Type: *No Product type* /      Patient  verified Yes     Visit #   Current / Total 17 36   Time   In / Out 1040 1136   Pain   In / Out 0/10  0/10   Subjective Functional Status/Changes:  \"I do want to try another month or at least some more PT to get better at bending and picking things up \"     Next PN/ RC due 24  Auth due EDMOND    TREATMENT AREA =  Right hip pain [M25.551]    OBJECTIVE      Modalities Rationale:  PD    Therapeutic Procedures:    Tx Min Billable or 1:1 Min (if diff from Tx Min) Procedure, Rationale, Specifics   20  67784 Therapeutic Exercise (timed):  increase ROM, strength, coordination, balance, and proprioception to improve patient's ability to progress to PLOF and address remaining functional goals. (see flow sheet as applicable)     Details if applicable:       10 10 77222 Neuromuscular Re-Education (timed):  improve balance, coordination, kinesthetic sense, posture, core stability and proprioception to improve patient's ability to develop conscious control of individual muscles and awareness of position of extremities in order to progress to PLOF and address remaining functional goals. (see flow sheet as applicable)     Details if applicable:      06046 Therapeutic Activity (timed):  use of dynamic activities replicating functional movements to increase ROM, strength, coordination, balance, and proprioception in order to improve patient's ability to progress to PLOF and address remaining functional goals.  (see flow sheet as applicable)     Details if applicable:    (I) with SS x 60'  with RTB   56* 46 [facility] Totals Reminder: bill using total billable min of TIMED therapeutic procedures (example: do not include dry needle or estim unattended, both untimed codes, in totals to left)  8-22 min = 1 unit; 23-37

## 2024-08-06 ENCOUNTER — HOSPITAL ENCOUNTER (OUTPATIENT)
Facility: HOSPITAL | Age: 74
Setting detail: RECURRING SERIES
Discharge: HOME | End: 2024-08-09
Payer: MEDICARE

## 2024-08-06 PROCEDURE — 97110 THERAPEUTIC EXERCISES: CPT

## 2024-08-06 PROCEDURE — 97112 NEUROMUSCULAR REEDUCATION: CPT

## 2024-08-06 PROCEDURE — 97530 THERAPEUTIC ACTIVITIES: CPT

## 2024-08-06 NOTE — PROGRESS NOTES
PHYSICAL  DAILY TREATMENT NOTE     Patient Name: Micaela Zavala    Date: 2024    : 1950  Insurance: Payor: [facility] MEDICARE / Plan: [facility] MEDICARE ADVANTAGE / Product Type: *No Product type* /      Patient  verified Yes     Visit #   Current / Total 18 36   Time   In / Out 1123 1203   Pain   In / Out 010  0/10   Subjective Functional Status/Changes:  \"I was bad and I didn't do my HEP this weekend. I will be better \"  Pt reports she bent over to  something and feels p! Behind the RLE, no p! In the hip     Next PN/ RC due 24  Auth due EDMOND    TREATMENT AREA =  Right hip pain [M25.551]    OBJECTIVE      Modalities Rationale:  PD    Therapeutic Procedures:    Tx Min Billable or 1:1 Min (if diff from Tx Min) Procedure, Rationale, Specifics   15  39488 Therapeutic Exercise (timed):  increase ROM, strength, coordination, balance, and proprioception to improve patient's ability to progress to PLOF and address remaining functional goals. (see flow sheet as applicable)     Details if applicable:       10  73909 Neuromuscular Re-Education (timed):  improve balance, coordination, kinesthetic sense, posture, core stability and proprioception to improve patient's ability to develop conscious control of individual muscles and awareness of position of extremities in order to progress to PLOF and address remaining functional goals. (see flow sheet as applicable)     Details if applicable:     15  04316 Therapeutic Activity (timed):  use of dynamic activities replicating functional movements to increase ROM, strength, coordination, balance, and proprioception in order to improve patient's ability to progress to PLOF and address remaining functional goals.  (see flow sheet as applicable)     Details if applicable:    (I) with anastasia fwd/retro c RTB x 60  (I) with SS x 60'  with RTB   40 40 [facility] Totals Reminder: bill using total billable min of TIMED therapeutic procedures (example: do not include dry

## 2024-08-08 ENCOUNTER — HOSPITAL ENCOUNTER (OUTPATIENT)
Facility: HOSPITAL | Age: 74
Setting detail: RECURRING SERIES
Discharge: HOME | End: 2024-08-11
Payer: MEDICARE

## 2024-08-08 PROCEDURE — 97530 THERAPEUTIC ACTIVITIES: CPT

## 2024-08-08 PROCEDURE — 97112 NEUROMUSCULAR REEDUCATION: CPT

## 2024-08-08 PROCEDURE — 97110 THERAPEUTIC EXERCISES: CPT

## 2024-08-08 NOTE — PROGRESS NOTES
has weak glut med/min, required for pain free housework, stair negotiation, lifting and walking. Pt requires more skilled PT to progress strengthening and help get into a routine for HEP for D/C.     If you have any questions/comments please contact us directly at [phone]   Thank you for allowing us to assist in the care of your patient.  PTA Signature Claudette Kaufman, PT     Therapist Signature: Claudette Kaufman PT Date: 8/8/2024   Reporting Period  7/09/24 - 08/08/24 Time: 2:05 PM

## 2024-08-08 NOTE — PROGRESS NOTES
of TIMED therapeutic procedures (example: do not include dry needle or estim unattended, both untimed codes, in totals to left)  8-22 min = 1 unit; 23-37 min = 2 units; 38-52 min = 3 units; 53-67 min = 4 units; 68-82 min = 5 units   Total Total     [x]  Patient Education billed concurrently with other procedures   [x] Review HEP    [] Progressed/Changed HEP, detail:    [] Other detail:  Pt ed on the benefits and importance of compliance with HEP    Objective Information/Functional Measures/Assessment  Pt presents with improved glut max, still weak through glut med bilaterally. Today focused on strengthening those muscles.     Patient will continue to benefit from skilled PT / OT services to modify and progress therapeutic interventions, analyze and address functional mobility deficits, analyze and address ROM deficits, analyze and address strength deficits, analyze and address soft tissue restrictions, analyze and cue for proper movement patterns, analyze and modify for postural abnormalities, and instruct in home and community integration to address functional deficits and attain remaining goals.    Progress toward goals / Updated goals:  [x]  See Progress Note/Recertification  Long Term Goals: To be accomplished in 6-8 weeks   Patient will demonstrate independence with HEP for independent symptom management and maintenance of therapy gains upon discharge.  Status at IE Initiated   Current: pt is doing her exercises 1-2 times per week on her own and it's not the whole scope of things. She does her stretches, the monster walk, occasionally leg lifts and LAQ, occasionally rubber band PARTIAL COMPLIANCE    2.  Patient will demonstrate 5/5 B hip flexion strength to facilitate gait and stair negotiation.   Status at IE 4/5  Right: 4+, Left 5- PROGRESSING    3.  Patient will demonstrate 5/5 B hip abduction strength to facilitate gait and stair negotiation.   Status at IE 4/5  Bilateral 4/5 SAME, pt encouraged to do

## 2024-08-12 ENCOUNTER — HOSPITAL ENCOUNTER (OUTPATIENT)
Facility: HOSPITAL | Age: 74
Setting detail: RECURRING SERIES
End: 2024-08-12
Payer: MEDICARE

## 2024-08-13 ENCOUNTER — APPOINTMENT (OUTPATIENT)
Facility: HOSPITAL | Age: 74
End: 2024-08-13
Payer: MEDICARE

## 2024-08-14 ENCOUNTER — HOSPITAL ENCOUNTER (OUTPATIENT)
Facility: HOSPITAL | Age: 74
Setting detail: RECURRING SERIES
Discharge: HOME | End: 2024-08-17
Payer: MEDICARE

## 2024-08-14 PROCEDURE — 97112 NEUROMUSCULAR REEDUCATION: CPT

## 2024-08-14 PROCEDURE — 97110 THERAPEUTIC EXERCISES: CPT

## 2024-08-14 PROCEDURE — 97530 THERAPEUTIC ACTIVITIES: CPT

## 2024-08-14 NOTE — PROGRESS NOTES
PHYSICAL / OCCUPATIONAL THERAPY - DAILY TREATMENT NOTE (updated )    Patient Name: Micaela Zavala    Date: 2024    : 1950  Insurance: Payor: [facility] MEDICARE / Plan: [facility] MEDICARE ADVANTAGE / Product Type: *No Product type* /      Patient  verified Yes     Visit #   Current / Total 20 36   Time   In / Out 10 10:40   Pain   In / Out 0 0   Subjective Functional Status/Changes: Pt apologizes for not making it into PT the other day. She had low blood pressure.      TREATMENT AREA =  Right hip pain [M25.551]    OBJECTIVE         Therapeutic Procedures:    Tx Min Billable or 1:1 Min (if diff from Tx Min) Procedure, Rationale, Specifics   15  19515 Therapeutic Activity (timed):  use of dynamic activities replicating functional movements to increase ROM, strength, coordination, balance, and proprioception in order to improve patient's ability to progress to PLOF and address remaining functional goals.  (see flow sheet as applicable)     Details if applicable:       15  40777 Therapeutic Exercise (timed):  increase ROM, strength, coordination, balance, and proprioception to improve patient's ability to progress to PLOF and address remaining functional goals. (see flow sheet as applicable)     Details if applicable:     10  12221 Neuromuscular Re-Education (timed):  improve balance, coordination, kinesthetic sense, posture, core stability and proprioception to improve patient's ability to develop conscious control of individual muscles and awareness of position of extremities in order to progress to PLOF and address remaining functional goals. (see flow sheet as applicable)     Details if applicable:            Details if applicable:            Details if applicable:     40  [facility] Totals Reminder: bill using total billable min of TIMED therapeutic procedures (example: do not include dry needle or estim unattended, both untimed codes, in totals to left)  8-22 min = 1 unit; 23-37 min = 2 units; 38-52 min = 3

## 2024-08-20 ENCOUNTER — HOSPITAL ENCOUNTER (OUTPATIENT)
Facility: HOSPITAL | Age: 74
Setting detail: RECURRING SERIES
Discharge: HOME | End: 2024-08-23
Payer: MEDICARE

## 2024-08-20 PROCEDURE — 97112 NEUROMUSCULAR REEDUCATION: CPT

## 2024-08-20 PROCEDURE — 97530 THERAPEUTIC ACTIVITIES: CPT

## 2024-08-20 PROCEDURE — 97110 THERAPEUTIC EXERCISES: CPT

## 2024-08-20 NOTE — PROGRESS NOTES
left)  8-22 min = 1 unit; 23-37 min = 2 units; 38-52 min = 3 units; 53-67 min = 4 units; 68-82 min = 5 units   Total Total     [x]  Patient Education billed concurrently with other procedures   [x] Review HEP    [] Progressed/Changed HEP, detail:    [] Other detail:       Objective Information/Functional Measures/Assessment    SS was progressed to band at ankles instead of knees. Pt tolerated this well.   Pt performed DL with excellent for and sit to stand with good Ecc control.   She required TC for clamshell form    Patient will continue to benefit from skilled PT / OT services to modify and progress therapeutic interventions, analyze and address functional mobility deficits, analyze and address ROM deficits, analyze and address strength deficits, analyze and address soft tissue restrictions, analyze and cue for proper movement patterns, analyze and modify for postural abnormalities, analyze and address imbalance/dizziness, and instruct in home and community integration to address functional deficits and attain remaining goals.    Progress toward goals / Updated goals:  []  See Progress Note/Recertification    Long Term Goals: To be accomplished in 6-8 weeks   Patient will demonstrate independence with HEP for independent symptom management and maintenance of therapy gains upon discharge.  Status at IE Initiated   Current: pt is doing her exercises 1-2 times per week on her own and it's not the whole scope of things. She does her stretches, the monster walk, occasionally leg lifts and LAQ, occasionally rubber band PARTIAL COMPLIANCE     2.  Patient will demonstrate 5/5 B hip flexion strength to facilitate gait and stair negotiation.   Status at IE 4/5  Right: 4+, Left 5- PROGRESSING     3.  Patient will demonstrate 5/5 B hip abduction strength to facilitate gait and stair negotiation.   Status at IE 4/5  Bilateral 4/5 SAME, pt encouraged to do HEP     4.  Patient will score greater than or equal to 37/80 on LEFS

## 2024-08-22 ENCOUNTER — HOSPITAL ENCOUNTER (OUTPATIENT)
Facility: HOSPITAL | Age: 74
Setting detail: RECURRING SERIES
Discharge: HOME | End: 2024-08-25
Payer: MEDICARE

## 2024-08-22 PROCEDURE — 97530 THERAPEUTIC ACTIVITIES: CPT

## 2024-08-22 PROCEDURE — 97112 NEUROMUSCULAR REEDUCATION: CPT

## 2024-08-22 PROCEDURE — 97110 THERAPEUTIC EXERCISES: CPT

## 2024-08-22 NOTE — PROGRESS NOTES
PHYSICAL / OCCUPATIONAL THERAPY - DAILY TREATMENT NOTE (updated )    Patient Name: Micaela Zavaal    Date: 2024    : 1950  Insurance: Payor: [facility] MEDICARE / Plan: [facility] MEDICARE ADVANTAGE / Product Type: *No Product type* /      Patient  verified Yes     Visit #   Current / Total 22 36   Time   In / Out 11:20 12   Pain   In / Out 0 0   Subjective Functional Status/Changes: Pt reports the dizziness has been better     TREATMENT AREA =  Right hip pain [M25.551]    OBJECTIVE         Therapeutic Procedures:    Tx Min Billable or 1:1 Min (if diff from Tx Min) Procedure, Rationale, Specifics   15  51658 Therapeutic Activity (timed):  use of dynamic activities replicating functional movements to increase ROM, strength, coordination, balance, and proprioception in order to improve patient's ability to progress to PLOF and address remaining functional goals.  (see flow sheet as applicable)     Details if applicable:       15  19872 Therapeutic Exercise (timed):  increase ROM, strength, coordination, balance, and proprioception to improve patient's ability to progress to PLOF and address remaining functional goals. (see flow sheet as applicable)     Details if applicable:     10  73040 Neuromuscular Re-Education (timed):  improve balance, coordination, kinesthetic sense, posture, core stability and proprioception to improve patient's ability to develop conscious control of individual muscles and awareness of position of extremities in order to progress to PLOF and address remaining functional goals. (see flow sheet as applicable)     Details if applicable:            Details if applicable:            Details if applicable:     40  [facility] Totals Reminder: bill using total billable min of TIMED therapeutic procedures (example: do not include dry needle or estim unattended, both untimed codes, in totals to left)  8-22 min = 1 unit; 23-37 min = 2 units; 38-52 min = 3 units; 53-67 min = 4 units; 68-82 min = 5  :  []  Other:    Claudette Kaufman, [place]    8/22/2024    9:44 AM    Future Appointments   Date Time Provider Department Center   8/22/2024 11:20 AM Claudette Kaufman, [place] [facility]   8/27/2024 11:20 AM Claudette Kaufman, [place] [facility]   8/28/2024 11:20 AM Dorene Napier, SUSH[place] [facility]   9/3/2024  1:20 PM Claudette Kaufman, [place] [facility]

## 2024-08-27 ENCOUNTER — HOSPITAL ENCOUNTER (OUTPATIENT)
Facility: HOSPITAL | Age: 74
Setting detail: RECURRING SERIES
Discharge: HOME | End: 2024-08-30
Payer: MEDICARE

## 2024-08-27 PROCEDURE — 97110 THERAPEUTIC EXERCISES: CPT

## 2024-08-27 PROCEDURE — 97530 THERAPEUTIC ACTIVITIES: CPT

## 2024-08-27 PROCEDURE — 97112 NEUROMUSCULAR REEDUCATION: CPT

## 2024-08-27 NOTE — PROGRESS NOTES
PHYSICAL / OCCUPATIONAL THERAPY - DAILY TREATMENT NOTE (updated )    Patient Name: Micaela Zavala    Date: 2024    : 1950  Insurance: Payor: [facility] MEDICARE / Plan: [facility] MEDICARE ADVANTAGE / Product Type: *No Product type* /      Patient  verified Yes     Visit #   Current / Total 23 36   Time   In / Out 11:20 12   Pain   In / Out 0 0   Subjective Functional Status/Changes: Pt reports the dizziness has been better     TREATMENT AREA =  Right hip pain [M25.551]    OBJECTIVE         Therapeutic Procedures:    Tx Min Billable or 1:1 Min (if diff from Tx Min) Procedure, Rationale, Specifics   15  39939 Therapeutic Activity (timed):  use of dynamic activities replicating functional movements to increase ROM, strength, coordination, balance, and proprioception in order to improve patient's ability to progress to PLOF and address remaining functional goals.  (see flow sheet as applicable)     Details if applicable:       15  88853 Therapeutic Exercise (timed):  increase ROM, strength, coordination, balance, and proprioception to improve patient's ability to progress to PLOF and address remaining functional goals. (see flow sheet as applicable)     Details if applicable:     10  38854 Neuromuscular Re-Education (timed):  improve balance, coordination, kinesthetic sense, posture, core stability and proprioception to improve patient's ability to develop conscious control of individual muscles and awareness of position of extremities in order to progress to PLOF and address remaining functional goals. (see flow sheet as applicable)     Details if applicable:            Details if applicable:            Details if applicable:     40  [facility] Totals Reminder: bill using total billable min of TIMED therapeutic procedures (example: do not include dry needle or estim unattended, both untimed codes, in totals to left)  8-22 min = 1 unit; 23-37 min = 2 units; 38-52 min = 3 units; 53-67 min = 4 units; 68-82 min = 5  Cephalexin Counseling: I counseled the patient regarding use of cephalexin as an antibiotic for prophylactic and/or therapeutic purposes. Cephalexin (commonly prescribed under brand name Keflex) is a cephalosporin antibiotic which is active against numerous classes of bacteria, including most skin bacteria. Side effects may include nausea, diarrhea, gastrointestinal upset, rash, hives, yeast infections, and in rare cases, hepatitis, kidney disease, seizures, fever, confusion, neurologic symptoms, and others. Patients with severe allergies to penicillin medications are cautioned that there is about a 10% incidence of cross-reactivity with cephalosporins. When possible, patients with penicillin allergies should use alternatives to cephalosporins for antibiotic therapy.

## 2024-08-28 ENCOUNTER — HOSPITAL ENCOUNTER (OUTPATIENT)
Facility: HOSPITAL | Age: 74
Setting detail: RECURRING SERIES
Discharge: HOME | End: 2024-08-31
Payer: MEDICARE

## 2024-08-28 PROCEDURE — 97112 NEUROMUSCULAR REEDUCATION: CPT

## 2024-08-28 PROCEDURE — 97530 THERAPEUTIC ACTIVITIES: CPT

## 2024-08-28 PROCEDURE — 97110 THERAPEUTIC EXERCISES: CPT

## 2024-08-28 NOTE — PROGRESS NOTES
PHYSICAL / OCCUPATIONAL THERAPY - DAILY TREATMENT NOTE (updated )    Patient Name: Micaela Zavala    Date: 2024    : 1950  Insurance: Payor: [facility] MEDICARE / Plan: [facility] MEDICARE ADVANTAGE / Product Type: *No Product type* /      Patient  verified Yes     Visit #   Current / Total 24 36   Time   In / Out 11:20 12   Pain   In / Out 0 0   Subjective Functional Status/Changes: Pt reports her knees feel a little stiff today.     TREATMENT AREA =  Right hip pain [M25.551]    OBJECTIVE         Therapeutic Procedures:    Tx Min Billable or 1:1 Min (if diff from Tx Min) Procedure, Rationale, Specifics   15  14808 Therapeutic Activity (timed):  use of dynamic activities replicating functional movements to increase ROM, strength, coordination, balance, and proprioception in order to improve patient's ability to progress to PLOF and address remaining functional goals.  (see flow sheet as applicable)     Details if applicable:       15  11436 Therapeutic Exercise (timed):  increase ROM, strength, coordination, balance, and proprioception to improve patient's ability to progress to PLOF and address remaining functional goals. (see flow sheet as applicable)     Details if applicable:     10  86951 Neuromuscular Re-Education (timed):  improve balance, coordination, kinesthetic sense, posture, core stability and proprioception to improve patient's ability to develop conscious control of individual muscles and awareness of position of extremities in order to progress to PLOF and address remaining functional goals. (see flow sheet as applicable)     Details if applicable:            Details if applicable:            Details if applicable:     40  [facility] Totals Reminder: bill using total billable min of TIMED therapeutic procedures (example: do not include dry needle or estim unattended, both untimed codes, in totals to left)  8-22 min = 1 unit; 23-37 min = 2 units; 38-52 min = 3 units; 53-67 min = 4 units; 68-82 min

## 2024-09-03 ENCOUNTER — HOSPITAL ENCOUNTER (OUTPATIENT)
Facility: HOSPITAL | Age: 74
Setting detail: RECURRING SERIES
Discharge: HOME | End: 2024-09-06
Payer: MEDICARE

## 2024-09-03 ENCOUNTER — APPOINTMENT (OUTPATIENT)
Facility: HOSPITAL | Age: 74
End: 2024-09-03
Payer: MEDICARE

## 2024-09-03 PROCEDURE — 97530 THERAPEUTIC ACTIVITIES: CPT

## 2024-09-03 PROCEDURE — 97110 THERAPEUTIC EXERCISES: CPT

## 2024-09-03 PROCEDURE — 97112 NEUROMUSCULAR REEDUCATION: CPT

## 2024-09-03 NOTE — THERAPY DISCHARGE
[facility]  [address] [place] [address]  Phone: [phone] Fax [phone]  DISCHARGE SUMMARY  Patient Name: Micaela Zavala : 1950   Treatment/Medical Diagnosis: Right hip pain [M25.551]   Referral Source: Scott Thompson PA     Date of Initial Visit: 24 Attended Visits: 25 Missed Visits: 0       SUMMARY OF TREATMENT  Patient's POC has consisted of therex, therapeutic activities, manual therapy prn, modalities prn, pt. education, and a comprehensive HEP. Treatment strategies used to address functional mobility deficits, ROM deficits, strength deficits, analyze and address soft tissue restrictions, analyze and cue movement patterns, analyze and modify body mechanics/ergonomics, assess and modify postural abnormalities and instruct in home and community integration.      CURRENT STATUS  Long Term Goals: To be accomplished in 6-8 weeks   Patient will demonstrate independence with HEP for independent symptom management and maintenance of therapy gains upon discharge.  Status at IE Initiated   Current: GOAL MET     2.  Patient will demonstrate 5/5 B hip flexion strength to facilitate gait and stair negotiation.   Status at IE 4/5  Right:GOAL MET     3.  Patient will demonstrate 5/5 B hip abduction strength to facilitate gait and stair negotiation.   Status at IE 4/5  GOAL MET     4.  Patient will score greater than or equal to 37/80 on LEFS to indicate improved activity tolerance and overall function.  Status at IE 28/80  Current: 51/80 GOAL MET    RECOMMENDATIONS  Pt has met all goals and is ready to be D/C at this time    If you have any questions/comments please contact us directly at [phone].   Thank you for allowing us to assist in the care of your patient.  PTA signature  Claudette Kaufman PT     Therapist Signature: Claudette Kaufman PT Date: 9/3/24   Reporting Period: 24 - 9/3/24 Time: 3:23 PM

## 2024-09-03 NOTE — PROGRESS NOTES
PHYSICAL / OCCUPATIONAL THERAPY - DAILY TREATMENT NOTE (updated )    Patient Name: Micaela Zavala    Date: 9/3/2024    : 1950  Insurance: Payor: [facility] MEDICARE / Plan: [facility] MEDICARE ADVANTAGE / Product Type: *No Product type* /      Patient  verified Yes     Visit #   Current / Total 25 36   Time   In / Out 11:20 12   Pain   In / Out 0 0   Subjective Functional Status/Changes: Pt reports her knees feel a little stiff today.     TREATMENT AREA =  Right hip pain [M25.551]    OBJECTIVE         Therapeutic Procedures:    Tx Min Billable or 1:1 Min (if diff from Tx Min) Procedure, Rationale, Specifics   15  93156 Therapeutic Activity (timed):  use of dynamic activities replicating functional movements to increase ROM, strength, coordination, balance, and proprioception in order to improve patient's ability to progress to PLOF and address remaining functional goals.  (see flow sheet as applicable)     Details if applicable:       15  03658 Therapeutic Exercise (timed):  increase ROM, strength, coordination, balance, and proprioception to improve patient's ability to progress to PLOF and address remaining functional goals. (see flow sheet as applicable)     Details if applicable:     10  69332 Neuromuscular Re-Education (timed):  improve balance, coordination, kinesthetic sense, posture, core stability and proprioception to improve patient's ability to develop conscious control of individual muscles and awareness of position of extremities in order to progress to PLOF and address remaining functional goals. (see flow sheet as applicable)     Details if applicable:            Details if applicable:            Details if applicable:     40  [facility] Totals Reminder: bill using total billable min of TIMED therapeutic procedures (example: do not include dry needle or estim unattended, both untimed codes, in totals to left)  8-22 min = 1 unit; 23-37 min = 2 units; 38-52 min = 3 units; 53-67 min = 4 units; 68-82 min

## 2024-09-03 NOTE — PROGRESS NOTES
PHYSICAL / OCCUPATIONAL THERAPY - DAILY TREATMENT NOTE (updated )    Patient Name: Micaela Zavala    Date: 9/3/2024    : 1950  Insurance: Payor: [facility] MEDICARE / Plan: [facility] MEDICARE ADVANTAGE / Product Type: *No Product type* /      Patient  verified Yes     Visit #   Current / Total 25 36   Time   In / Out 1:20 2   Pain   In / Out 0 0   Subjective Functional Status/Changes: Pt reports feeling good     TREATMENT AREA =  Right hip pain [M25.551]    OBJECTIVE    Therapeutic Procedures:    Tx Min Billable or 1:1 Min (if diff from Tx Min) Procedure, Rationale, Specifics   15  34186 Therapeutic Activity (timed):  use of dynamic activities replicating functional movements to increase ROM, strength, coordination, balance, and proprioception in order to improve patient's ability to progress to PLOF and address remaining functional goals.  (see flow sheet as applicable)     Details if applicable:       15  94497 Therapeutic Exercise (timed):  increase ROM, strength, coordination, balance, and proprioception to improve patient's ability to progress to PLOF and address remaining functional goals. (see flow sheet as applicable)     Details if applicable:     10  75873 Neuromuscular Re-Education (timed):  improve balance, coordination, kinesthetic sense, posture, core stability and proprioception to improve patient's ability to develop conscious control of individual muscles and awareness of position of extremities in order to progress to PLOF and address remaining functional goals. (see flow sheet as applicable)     Details if applicable:            Details if applicable:            Details if applicable:     40  [facility] Totals Reminder: bill using total billable min of TIMED therapeutic procedures (example: do not include dry needle or estim unattended, both untimed codes, in totals to left)  8-22 min = 1 unit; 23-37 min = 2 units; 38-52 min = 3 units; 53-67 min = 4 units; 68-82 min = 5 units   Total Total     [x]

## 2025-04-02 DIAGNOSIS — I10 ESSENTIAL HYPERTENSION WITH GOAL BLOOD PRESSURE LESS THAN 140/90: ICD-10-CM

## 2025-04-02 DIAGNOSIS — I10 ESSENTIAL (PRIMARY) HYPERTENSION: ICD-10-CM

## 2025-04-02 RX ORDER — FLECAINIDE ACETATE 50 MG/1
50 TABLET ORAL 2 TIMES DAILY
Qty: 180 TABLET | Refills: 2 | OUTPATIENT
Start: 2025-04-02

## 2025-04-07 NOTE — TELEPHONE ENCOUNTER
PCP: No, Pcp    Last appt:  4/25/2024   No future appointments.    Requested Prescriptions     Pending Prescriptions Disp Refills    carvedilol (COREG) 6.25 MG tablet [Pharmacy Med Name: CARVEDILOL TAB 6.25MG] 30 tablet 0     Sig: TAKE ONE-HALF (1/2) TABLET TWICE A DAY       Request for a 30 or 90 day supply? Provider Discretion    Pharmacy: confirmed    Other Comments: n/a

## 2025-04-09 RX ORDER — CARVEDILOL 6.25 MG/1
3.12 TABLET ORAL 2 TIMES DAILY
Qty: 30 TABLET | Refills: 0 | Status: SHIPPED | OUTPATIENT
Start: 2025-04-09

## 2025-05-16 DIAGNOSIS — I10 ESSENTIAL (PRIMARY) HYPERTENSION: ICD-10-CM

## 2025-05-16 DIAGNOSIS — I10 ESSENTIAL HYPERTENSION WITH GOAL BLOOD PRESSURE LESS THAN 140/90: ICD-10-CM

## 2025-05-16 RX ORDER — FLECAINIDE ACETATE 50 MG/1
50 TABLET ORAL 2 TIMES DAILY
Qty: 180 TABLET | Refills: 2 | OUTPATIENT
Start: 2025-05-16

## 2025-07-02 NOTE — PROGRESS NOTES
Caller: Briana Alas    Relationship: Self    Best call back number: [phone] PLEASE ADVISE WHEN DONE     What medication are you requesting:   oxyCODONE (ROXICODONE) 15 MG immediate release tablet 15 mg Every 6 Hours PRN             Have you had these symptoms before:    [x] Yes  [] No    Have you been treated for these symptoms before:   [x] Yes  [] No    If a prescription is needed, what is your preferred pharmacy and phone number: [facility] [id number] [address] - [phone] [facility] [phone]      Additional notes: THE EVERY 8 HOUR WAS SENT IN 7/1/25, NEEDS TO BE CORRECTED AT [facility], TO EVERY 6 HOURS PRN. SHE IS RUNNING SHORT WITHOUT THE NEW DOSAGE SENT     oxyCODONE (ROXICODONE) 15 MG immediate release tablet 15 mg Every 6 Hours PRN      Marina Virk is a 65 y.o. female, evaluated via audio-only technology on 8/11/2020 for Palpitations  . Assessment & Plan:   Diagnoses and all orders for this visit:    1. Muscle spasm  -     cyclobenzaprine (FLEXERIL) 10 mg tablet; Take 1 Tab by mouth three (3) times daily as needed for Muscle Spasm(s). 2. SVT (supraventricular tachycardia) ([address]    3. Left sided sciatica  -     cyclobenzaprine (FLEXERIL) 10 mg tablet; Take 1 Tab by mouth three (3) times daily as needed for Muscle Spasm(s). 12  Subjective:   She is taking metoprolol 12.5 mg twice a day with resolution of lightheadedness or dizziness. She is taking 1/2 tab of 81 mg aspirin without side effects  She is requesting cyclobenzaprine intermittently for cramps. Prior to Admission medications    Medication Sig Start Date End Date Taking? Authorizing Provider   HYDROcodone-acetaminophen (Norco) 5-325 mg per tablet Take  by mouth. Provider, Historical   amoxicillin (AMOXIL) 875 mg tablet Take 875 mg by mouth two (2) times a day. Provider, Historical   metoprolol succinate (TOPROL-XL) 25 mg XL tablet Take 1 Tab by mouth daily. 7/28/20   Lynsey Ma PA   gabapentin (NEURONTIN) 100 mg capsule Take 1 Cap by mouth three (3) times daily. Max Daily Amount: 300 mg. 4/20/20   Zeenat Norwood MD   tiZANidine (ZANAFLEX) 4 mg tablet Take 1 Tab by mouth three (3) times daily as needed for Pain. 4/20/20   Zeenat Norwood MD   diclofenac (VOLTAREN) 1 % gel Apply 2 g to affected area four (4) times daily. For hand pain 4/20/20   Zeenat Norwood MD   dextran 70-hypromellose (ARTIFICIAL TEARS,[id number],) ophthalmic solution Administer 1 Drop to both eyes as needed. Provider, Historical   fexofenadine-pseudoephedrine (ALLEGRA-D 12 HOUR)  mg Tb12 Take 1 Tab by mouth every twelve (12) hours. Provider, Historical   polyethylene glycol (MIRALAX) 17 gram packet Take 1 Packet by mouth daily.  5/31/18   Vicki Carrillo., DO       ROS    No flowsheet data found. Cem Calvin, who was evaluated through a patient-initiated, synchronous (real-time) audio only encounter, and/or her healthcare decision maker, is aware that it is a billable service, with coverage as determined by her insurance carrier. She provided verbal consent to proceed: Yes. She has not had a related appointment within my department in the past 7 days or scheduled within the next 24 hours.       Total Time: minutes: 5-10 minutes    Claudia Lin MD

## (undated) DEVICE — TUBE SET IRR PUMP THERMALCOOL -- SMARTABLATE

## (undated) DEVICE — LIMB HOLDER, WRIST/ANKLE: Brand: DEROYAL

## (undated) DEVICE — MEDI-TRACE CADENCE ADULT, DEFIBRILLATION ELECTRODE -RTS  (10 PR/PK) - PHYSIO-CONTROL: Brand: MEDI-TRACE CADENCE

## (undated) DEVICE — REM POLYHESIVE ADULT PATIENT RETURN ELECTRODE: Brand: VALLEYLAB

## (undated) DEVICE — INTRODUCER SHTH 6FR CANN L11CM DIL TIP 35MM GRN TUNGSTEN

## (undated) DEVICE — FIXED CURVE DIAGNOSTIC CATHETER: Brand: VIKING™ SOFT TIP

## (undated) DEVICE — KENDALL RADIOLUCENT FOAM MONITORING ELECTRODE RECTANGULAR SHAPE: Brand: KENDALL

## (undated) DEVICE — CATH BIDRCT DF 8FR 115CMX3.5MM --

## (undated) DEVICE — Device

## (undated) DEVICE — INTRODUCER SHTH 7FR CANN L11CM DIL TIP 35MM ORNG TUNGSTEN

## (undated) DEVICE — UNIDIRECTIONAL STEERABLE DIAGNOSTIC CATHETER: Brand: EP XT™

## (undated) DEVICE — PACK PROCEDURE SURG VASC CATH 161 MMC LF

## (undated) DEVICE — CATHETER ELECTROPHYSIOLOGY CRD 2 5 MM 6 FRX120 CM SUPREME

## (undated) DEVICE — PATCH CARTO 3 EXT REF --